# Patient Record
Sex: MALE | Race: WHITE | NOT HISPANIC OR LATINO | Employment: UNEMPLOYED | URBAN - NONMETROPOLITAN AREA
[De-identification: names, ages, dates, MRNs, and addresses within clinical notes are randomized per-mention and may not be internally consistent; named-entity substitution may affect disease eponyms.]

---

## 2017-01-23 ENCOUNTER — TELEPHONE (OUTPATIENT)
Dept: CARDIOLOGY | Facility: CLINIC | Age: 82
End: 2017-01-23

## 2017-01-23 RX ORDER — METOPROLOL SUCCINATE 25 MG/1
TABLET, EXTENDED RELEASE ORAL
Qty: 30 TABLET | Refills: 4 | Status: SHIPPED | OUTPATIENT
Start: 2017-01-23 | End: 2017-06-26 | Stop reason: SDUPTHER

## 2017-01-23 NOTE — TELEPHONE ENCOUNTER
CALLED AND SPOKE WITH PT TO VERIFY WHICH METOPROLOL HE IS TAKING SINCE WE HAVE METOPROLOL TART 25MG LISTED & METOPROLOL SUCC 25MG LISTED IN CHART. (WE HAD RECEIVED A REFILL REQUEST ON EPIC FOR METOPROLOL SUCC ER)     PT STATED HE IS TAKING METOPROLOL SUCC ER 25MG QD.    I WILL D/C THE TARTRATE IN HIS MED LIST PER PT.

## 2017-06-26 RX ORDER — METOPROLOL SUCCINATE 25 MG/1
TABLET, EXTENDED RELEASE ORAL
Qty: 30 TABLET | Refills: 3 | Status: SHIPPED | OUTPATIENT
Start: 2017-06-26 | End: 2017-11-08 | Stop reason: SDUPTHER

## 2017-07-19 ENCOUNTER — LAB (OUTPATIENT)
Dept: UROLOGY | Facility: CLINIC | Age: 82
End: 2017-07-19

## 2017-07-19 DIAGNOSIS — C61 PROSTATE CANCER (HCC): Primary | ICD-10-CM

## 2017-07-19 LAB — PSA SERPL-MCNC: 0.02 NG/ML (ref 0–4)

## 2017-07-19 PROCEDURE — 84153 ASSAY OF PSA TOTAL: CPT | Performed by: UROLOGY

## 2017-07-28 ENCOUNTER — OFFICE VISIT (OUTPATIENT)
Dept: UROLOGY | Facility: CLINIC | Age: 82
End: 2017-07-28

## 2017-07-28 VITALS
SYSTOLIC BLOOD PRESSURE: 124 MMHG | HEIGHT: 70 IN | HEART RATE: 59 BPM | DIASTOLIC BLOOD PRESSURE: 70 MMHG | WEIGHT: 185 LBS | BODY MASS INDEX: 26.48 KG/M2

## 2017-07-28 DIAGNOSIS — R31.9 HEMATURIA: Primary | ICD-10-CM

## 2017-07-28 DIAGNOSIS — C61 CA OF PROSTATE (HCC): ICD-10-CM

## 2017-07-28 LAB
BILIRUB BLD-MCNC: NEGATIVE MG/DL
CLARITY, POC: CLEAR
COLOR UR: YELLOW
GLUCOSE UR STRIP-MCNC: NEGATIVE MG/DL
KETONES UR QL: NEGATIVE
LEUKOCYTE EST, POC: NEGATIVE
NITRITE UR-MCNC: NEGATIVE MG/ML
PH UR: 6 [PH] (ref 5–8)
PROT UR STRIP-MCNC: NEGATIVE MG/DL
RBC # UR STRIP: NEGATIVE /UL
SP GR UR: 1.03 (ref 1–1.03)
UROBILINOGEN UR QL: NORMAL

## 2017-07-28 PROCEDURE — 99213 OFFICE O/P EST LOW 20 MIN: CPT | Performed by: UROLOGY

## 2017-07-28 PROCEDURE — 81002 URINALYSIS NONAUTO W/O SCOPE: CPT | Performed by: UROLOGY

## 2017-07-28 NOTE — PROGRESS NOTES
Chief Complaint:          Chief Complaint   Patient presents with   • Prostate Cancer     Yearly follow up        HPI:   82 y.o. male.  82-year-old white male status post a radical retropubic prostatectomy by Dr. Bahena 3 years ago he is doing great he has no stress leakage she has occasional dribbling.  He has no erection problems his erection is not as strong as it was but is certainly adequate there is no frequency, urgency, there is no skeletal related events symptomatology there is no constitutional symptomatology he has an empty rectal fossa and his PSA was 0.20 noted on 7/19/17        Past Medical History:        Past Medical History:   Diagnosis Date   • Acute myocardial infarction    • Adenocarcinoma     prostate gland   • Arthritis    • Heart attack    • Hx of radiation therapy    • Hypertension    • Prostate cancer          Current Meds:     Current Outpatient Prescriptions   Medication Sig Dispense Refill   • amiodarone (PACERONE) 200 MG tablet Take  by mouth.     • amitriptyline (ELAVIL) 10 MG tablet Take 10 mg by mouth Every Night. 1/2 tablet at bedtime     • aspirin 81 MG tablet Take 81 mg by mouth Daily.     • HYDROcodone-acetaminophen (NORCO) 7.5-325 MG per tablet Take  by mouth.     • lisinopril (PRINIVIL,ZESTRIL) 10 MG tablet Take 10 mg by mouth Daily.     • lisinopril-hydrochlorothiazide (ZESTORETIC) 20-25 MG per tablet Take  by mouth.     • metoprolol succinate XL (TOPROL-XL) 25 MG 24 hr tablet TAKE ONE TABLET BY MOUTH DAILY 30 tablet 3   • naproxen sodium (ALEVE) 220 MG tablet Take  by mouth.     • rosuvastatin (CRESTOR) 5 MG tablet Take 5 mg by mouth Daily.     • tamsulosin (FLOMAX) 0.4 MG capsule 24 hr capsule Take 1 capsule by mouth Every Night.       No current facility-administered medications for this visit.         Allergies:      No Known Allergies     Past Surgical History:     Past Surgical History:   Procedure Laterality Date   • BIOPSY PROSTATE NEEDLE / PUNCH / INCISIONAL     •  CORONARY ARTERY BYPASS GRAFT     • OTHER SURGICAL HISTORY      Coronary Artery Triple Arterial Byupass Graft   • PROSTATE SURGERY     • PROSTATECTOMY      Robotic-assisted   • VASECTOMY           Social History:     Social History     Social History   • Marital status:      Spouse name: N/A   • Number of children: N/A   • Years of education: N/A     Occupational History   • Not on file.     Social History Main Topics   • Smoking status: Never Smoker   • Smokeless tobacco: Not on file   • Alcohol use Yes   • Drug use: Not on file   • Sexual activity: Not on file     Other Topics Concern   • Not on file     Social History Narrative       Family History:     Family History   Problem Relation Age of Onset   • Kidney disease Father    • Heart disease Father    • Heart attack Father    • Kidney disease Mother    • Heart disease Mother        Review of Systems:     Review of Systems   Constitutional: Negative.    HENT: Negative.    Eyes: Negative.    Respiratory: Negative.    Cardiovascular: Negative.    Gastrointestinal: Negative.    Endocrine: Negative.    Genitourinary: Negative.    Musculoskeletal: Negative.    Allergic/Immunologic: Negative.    Neurological: Negative.    Hematological: Negative.    Psychiatric/Behavioral: Negative.        Physical Exam:     Physical Exam   Constitutional: He is oriented to person, place, and time. He appears well-developed and well-nourished.   HENT:   Head: Normocephalic and atraumatic.   Eyes: Conjunctivae and EOM are normal. Pupils are equal, round, and reactive to light.   Neck: Normal range of motion.   Cardiovascular: Normal rate, regular rhythm, normal heart sounds and intact distal pulses.    Pulmonary/Chest: Effort normal and breath sounds normal.   Abdominal: Soft. Bowel sounds are normal.   Genitourinary: Penis normal.   Genitourinary Comments: Good rectal tone and an empty rectal fossa   Musculoskeletal: Normal range of motion.   Neurological: He is alert and  oriented to person, place, and time. He has normal reflexes.   Skin: Skin is warm and dry.   Psychiatric: He has a normal mood and affect. His behavior is normal. Judgment and thought content normal.   Nursing note and vitals reviewed.      Procedure:       Assessment:   No diagnosis found.  No orders of the defined types were placed in this encounter.      Plan:   *Prostate cancer status post a radical prostatectomy with a castrate prostate doing great with no symptomatology he is clearly cured from his RRP surgery**           This document has been electronically signed by JAN SANDOVAL MD July 28, 2017 10:08 AM

## 2017-10-30 ENCOUNTER — OFFICE VISIT (OUTPATIENT)
Dept: CARDIOLOGY | Facility: CLINIC | Age: 82
End: 2017-10-30

## 2017-10-30 VITALS
WEIGHT: 203.5 LBS | BODY MASS INDEX: 29.13 KG/M2 | SYSTOLIC BLOOD PRESSURE: 129 MMHG | HEART RATE: 67 BPM | DIASTOLIC BLOOD PRESSURE: 70 MMHG | OXYGEN SATURATION: 95 % | HEIGHT: 70 IN

## 2017-10-30 DIAGNOSIS — I25.10 ARTERIOSCLEROSIS OF CORONARY ARTERY: Primary | ICD-10-CM

## 2017-10-30 DIAGNOSIS — I10 ESSENTIAL HYPERTENSION: ICD-10-CM

## 2017-10-30 DIAGNOSIS — E78.2 MIXED HYPERLIPIDEMIA: ICD-10-CM

## 2017-10-30 DIAGNOSIS — I48.0 PAROXYSMAL A-FIB (HCC): ICD-10-CM

## 2017-10-30 PROCEDURE — 99213 OFFICE O/P EST LOW 20 MIN: CPT | Performed by: NURSE PRACTITIONER

## 2017-10-30 PROCEDURE — 93000 ELECTROCARDIOGRAM COMPLETE: CPT | Performed by: NURSE PRACTITIONER

## 2017-10-30 NOTE — PROGRESS NOTES
Subjective     Chief Complaint: Follow-up    History of Present Illness   Angelito Carter is a delightful 82 y.o. male who presents for follow-up of paroxysmal atrial fibrillation, arteriosclerosis of coronary artery, and hypertension.  He initially presented in March 2015 with acute coronary syndrome and was noted to have an occluded LAD.  He underwent  balloon angioplasty of the LAD which stabilized him and then he subsequently underwent bypass surgery with an internal mammary artery graft placed to the LAD, a vein graft placed to the obtuse marginal branch and another vein graft placed to the posterior descending artery.      He has done very well since his bypass surgery and at today's visit he denies complaints of chest pain, chest tightness, palpitations, lower extremity swelling, syncope or near syncope.  He does report that he bruises easily however he does deny bright red blood per rectum or black tarry stools.  Mr. Carter exercises regularly, walking on a treadmill.  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  He is compliant with medication administration.  He reports blood pressures at home to be in the 120s 130s over 70 and 80.        Current Outpatient Prescriptions:   •  amiodarone (PACERONE) 200 MG tablet, Take  by mouth., Disp: , Rfl:   •  amitriptyline (ELAVIL) 10 MG tablet, Take 10 mg by mouth Every Night. 1/2 tablet at bedtime, Disp: , Rfl:   •  aspirin 81 MG tablet, Take 81 mg by mouth Daily., Disp: , Rfl:   •  HYDROcodone-acetaminophen (NORCO) 7.5-325 MG per tablet, Take  by mouth., Disp: , Rfl:   •  lisinopril (PRINIVIL,ZESTRIL) 10 MG tablet, Take 10 mg by mouth Daily., Disp: , Rfl:   •  lisinopril-hydrochlorothiazide (ZESTORETIC) 20-25 MG per tablet, Take  by mouth., Disp: , Rfl:   •  metoprolol succinate XL (TOPROL-XL) 25 MG 24 hr tablet, TAKE ONE TABLET BY MOUTH DAILY, Disp: 30 tablet, Rfl: 3  •  naproxen sodium (ALEVE) 220 MG tablet, Take  by mouth., Disp: , Rfl:   •  rosuvastatin (CRESTOR) 5  "MG tablet, Take 5 mg by mouth Daily., Disp: , Rfl:   •  tamsulosin (FLOMAX) 0.4 MG capsule 24 hr capsule, Take 1 capsule by mouth Every Night., Disp: , Rfl:      The following portions of the patient's history were reviewed and updated as appropriate: allergies, current medications, past family history, past medical history, past social history, past surgical history and problem list.    Review of Systems   Constitutional: Negative for activity change, appetite change and fatigue.   HENT: Negative for congestion and tinnitus.    Eyes: Negative for visual disturbance.   Respiratory: Negative for cough, chest tightness and shortness of breath.    Cardiovascular: Negative for chest pain, palpitations and leg swelling.   Gastrointestinal: Negative for blood in stool, nausea and vomiting.   Endocrine: Negative for cold intolerance, heat intolerance and polyuria.   Genitourinary: Negative for dysuria.   Musculoskeletal: Negative for myalgias and neck pain.   Skin: Negative for rash.   Neurological: Negative for dizziness, syncope, weakness and light-headedness.   Hematological: Bruises/bleeds easily.   Psychiatric/Behavioral: Negative for confusion and sleep disturbance.       Objective     /70 (BP Location: Left arm, Patient Position: Sitting)  Pulse 67  Ht 70\" (177.8 cm)  Wt 203 lb 8 oz (92.3 kg)  SpO2 95%  BMI 29.2 kg/m2    Physical Exam   Constitutional: He appears well-developed and well-nourished.   HENT:   Head: Normocephalic and atraumatic.   Eyes: Pupils are equal, round, and reactive to light.   Neck: No JVD present.   Cardiovascular: Normal rate, regular rhythm, S1 normal, S2 normal, normal heart sounds and intact distal pulses.  Exam reveals no gallop and no friction rub.    No murmur heard.  Pulses:       Radial pulses are 2+ on the right side, and 2+ on the left side.        Dorsalis pedis pulses are 2+ on the right side, and 2+ on the left side.        Posterior tibial pulses are 2+ on the right " side, and 2+ on the left side.   No lower extremity edema   Pulmonary/Chest: Effort normal and breath sounds normal. No respiratory distress. He has no wheezes. He has no rales.   Abdominal: Soft. He exhibits no mass. There is no tenderness. No hernia.   Skin: Skin is warm and dry.   Psychiatric: He has a normal mood and affect.         ECG 12 Lead  Date/Time: 10/30/2017 9:55 AM  Performed by: BIJAN AYALA  Authorized by: BIJAN AYALA   Comparison: compared with previous ECG from 10/28/2016  Similar to previous ECG  Rhythm: sinus rhythm  Rate: normal  BPM: 63  Q waves: V1 and V2  Clinical impression: abnormal ECG  Comments: Septal myocardial infarction of indeterminate age              Assessment/Plan       Angelito was seen today for follow-up.    Diagnoses and all orders for this visit:    Arteriosclerosis of coronary artery     -Stable.   -Continue current medications:  Metoprolol succinate, and lisinopril   -Continue risk modifications    Essential hypertension     -Stable   -Continue current medications:  Metoprolol succinate, lisinopril, and hydrochlorothiazide   -Continue risk modifications    Mixed hyperlipidemia     -Stable by patient report   -Copy of most recent labs request from PCP office   -Continue current medications:  Crestor   -Continue lifestyle modifications    HX OF Paroxysmal a-fib      -Stable.  Denies palpitations   -Continue amiodarone.       Risk Factor Modification     I have discussed a heart healthy diet with the patient.  I have encouraged a diet high in fruits and vegetables as well as lean protein and whole grains.  I have indicated to the patient that even a modest reduction of 3-5% in his/her weight can provide health benefits by decreasing blood sugar and triglycerides; a greater than 5% weight reduction can improve blood pressure, lipids and reduce the need for some medications.       I have encouraged the patient to continue current level of activity.  I have advised  them that the recommendation is to participate in physical activity at least 30 minutes a day, 5-7 days a week.           Return to clinic in 1 year and when necessary      REILLY Zazueta

## 2017-11-10 RX ORDER — METOPROLOL SUCCINATE 25 MG/1
TABLET, EXTENDED RELEASE ORAL
Qty: 30 TABLET | Refills: 11 | Status: SHIPPED | OUTPATIENT
Start: 2017-11-10 | End: 2018-09-06 | Stop reason: SDUPTHER

## 2018-09-06 RX ORDER — METOPROLOL SUCCINATE 25 MG/1
25 TABLET, EXTENDED RELEASE ORAL DAILY
Qty: 30 TABLET | Refills: 1 | Status: SHIPPED | OUTPATIENT
Start: 2018-09-06 | End: 2018-11-04 | Stop reason: SDUPTHER

## 2018-09-20 ENCOUNTER — OFFICE VISIT (OUTPATIENT)
Dept: UROLOGY | Facility: CLINIC | Age: 83
End: 2018-09-20

## 2018-09-20 DIAGNOSIS — C61 CA OF PROSTATE (HCC): ICD-10-CM

## 2018-09-20 DIAGNOSIS — N40.0 BPH WITHOUT URINARY OBSTRUCTION: Primary | ICD-10-CM

## 2018-09-20 LAB — PSA SERPL-MCNC: 0.01 NG/ML (ref 0–4)

## 2018-09-20 PROCEDURE — 99213 OFFICE O/P EST LOW 20 MIN: CPT | Performed by: UROLOGY

## 2018-09-20 PROCEDURE — 84153 ASSAY OF PSA TOTAL: CPT | Performed by: UROLOGY

## 2018-09-20 NOTE — PROGRESS NOTES
Chief Complaint:          No chief complaint on file.      HPI:   83 y.o. male.  83-year-old white male status post a radical retropubic prostatectomy by Dr. Bahena 3 years ago he is doing great he has no stress leakage she has occasional dribbling.  He has no erection problems his erection is not as strong as it was but is certainly adequate there is no frequency, urgency, there is no skeletal related events symptomatology there is no constitutional symptomatology he has an empty rectal fossa and his PSA was 0.020 noted on 7/19/17.  He returns today he's doing fantastic he's having no burning, blood, discharge, he has no voiding dysfunction and no other systemic symptomatology particularly skeletal related events       Past Medical History:        Past Medical History:   Diagnosis Date   • Acute myocardial infarction    • Adenocarcinoma (CMS/HCC)     prostate gland   • Arthritis    • Heart attack    • Hx of radiation therapy    • Hypertension    • Prostate cancer (CMS/HCC)          Current Meds:     Current Outpatient Prescriptions   Medication Sig Dispense Refill   • amiodarone (PACERONE) 200 MG tablet Take  by mouth.     • amitriptyline (ELAVIL) 10 MG tablet Take 10 mg by mouth Every Night. 1/2 tablet at bedtime     • aspirin 81 MG tablet Take 81 mg by mouth Daily.     • HYDROcodone-acetaminophen (NORCO) 7.5-325 MG per tablet Take  by mouth.     • lisinopril (PRINIVIL,ZESTRIL) 10 MG tablet Take 10 mg by mouth Daily.     • lisinopril-hydrochlorothiazide (ZESTORETIC) 20-25 MG per tablet Take  by mouth.     • metoprolol succinate XL (TOPROL-XL) 25 MG 24 hr tablet Take 1 tablet by mouth Daily. 30 tablet 1   • naproxen sodium (ALEVE) 220 MG tablet Take  by mouth.     • rosuvastatin (CRESTOR) 5 MG tablet Take 5 mg by mouth Daily.     • tamsulosin (FLOMAX) 0.4 MG capsule 24 hr capsule Take 1 capsule by mouth Every Night.       No current facility-administered medications for this visit.         Allergies:      No Known  Allergies     Past Surgical History:     Past Surgical History:   Procedure Laterality Date   • BIOPSY PROSTATE NEEDLE / PUNCH / INCISIONAL     • CORONARY ARTERY BYPASS GRAFT     • OTHER SURGICAL HISTORY      Coronary Artery Triple Arterial Byupass Graft   • PROSTATE SURGERY     • PROSTATECTOMY      Robotic-assisted   • VASECTOMY           Social History:     Social History     Social History   • Marital status:      Spouse name: N/A   • Number of children: N/A   • Years of education: N/A     Occupational History   • Not on file.     Social History Main Topics   • Smoking status: Never Smoker   • Smokeless tobacco: Not on file   • Alcohol use Yes   • Drug use: Unknown   • Sexual activity: Not on file     Other Topics Concern   • Not on file     Social History Narrative   • No narrative on file       Family History:     Family History   Problem Relation Age of Onset   • Kidney disease Father    • Heart disease Father    • Heart attack Father    • Kidney disease Mother    • Heart disease Mother        Review of Systems:     Review of Systems   Constitutional: Negative.    HENT: Negative.    Eyes: Negative.    Respiratory: Negative.    Cardiovascular: Negative.    Gastrointestinal: Negative.    Endocrine: Negative.    Musculoskeletal: Negative.    Allergic/Immunologic: Negative.    Neurological: Negative.    Hematological: Negative.    Psychiatric/Behavioral: Negative.        Physical Exam:     Physical Exam   Constitutional: He is oriented to person, place, and time. He appears well-developed and well-nourished.   HENT:   Head: Normocephalic and atraumatic.   Eyes: Pupils are equal, round, and reactive to light. Conjunctivae and EOM are normal.   Neck: Normal range of motion.   Cardiovascular: Normal rate, regular rhythm, normal heart sounds and intact distal pulses.    Pulmonary/Chest: Effort normal and breath sounds normal.   Abdominal: Soft. Bowel sounds are normal.   Genitourinary:   Genitourinary Comments:  Empty rectal fossa   Musculoskeletal: Normal range of motion.   Neurological: He is alert and oriented to person, place, and time. He has normal reflexes.   Skin: Skin is warm and dry.   Psychiatric: He has a normal mood and affect. His behavior is normal. Judgment and thought content normal.   Nursing note and vitals reviewed.      I have reviewed the following portions of the patient's history: allergies, current medications, past family history, past medical history, past social history, past surgical history, problem list and ROS and confirm it's accurate.      Procedure:       Assessment/Plan:   Prostate cancer:  He returns today status post biopsy for extensive discussion of prostate cancer.  We discussed staging, and grading of the disease.  I described with a [place] system being from a 2-10 scale with the most common low-grade pattern being a [place] 6.  I discussed the staging workup including a total body bone scan and CT scan especially with a PSA is greater than 10.  We discussed the various options at length I discussed a radical retropubic prostatectomy done in the traditional fashion and using the robotic technique.  I then discussed radiation treatment both seed therapy and external beam therapy using Gold fiduciary markers.  We talked about some of the other alternatives such as a cryosurgical ablation at high intensity focused ultrasound as being viable alternatives but not recommended at this time.  He focused on aggressive watchful waiting and explained that currently low literature it's been discovered that a lot of men can actually observe it especially with numerous other comorbidities cannot have problems from the cancer by itself.  Talked about hormonal ablation.  In the side effects of creation of insulin resistance.  Overall, the patient was given appropriate literature is going to think about it and I'm going to revisit the topic with them after her next office visit he's clearly cured  and doing great     Patient's There is no height or weight on file to calculate BMI. BMI is above normal parameters. Recommendations include: educational material.          This document has been electronically signed by JAN SANDOVAL MD September 20, 2018 10:29 AM

## 2018-09-21 ENCOUNTER — TELEPHONE (OUTPATIENT)
Dept: UROLOGY | Facility: CLINIC | Age: 83
End: 2018-09-21

## 2018-09-21 NOTE — TELEPHONE ENCOUNTER
I called the patient with his results.    ----- Message from Faizan Lopez MD sent at 9/20/2018  2:16 PM EDT -----  Notify fantastic

## 2018-10-31 ENCOUNTER — OFFICE VISIT (OUTPATIENT)
Dept: CARDIOLOGY | Facility: CLINIC | Age: 83
End: 2018-10-31

## 2018-10-31 VITALS
SYSTOLIC BLOOD PRESSURE: 137 MMHG | OXYGEN SATURATION: 96 % | HEIGHT: 70 IN | WEIGHT: 206.5 LBS | BODY MASS INDEX: 29.56 KG/M2 | DIASTOLIC BLOOD PRESSURE: 79 MMHG | HEART RATE: 67 BPM

## 2018-10-31 DIAGNOSIS — I48.0 PAROXYSMAL A-FIB (HCC): ICD-10-CM

## 2018-10-31 DIAGNOSIS — I10 ESSENTIAL HYPERTENSION: ICD-10-CM

## 2018-10-31 DIAGNOSIS — E78.2 MIXED HYPERLIPIDEMIA: ICD-10-CM

## 2018-10-31 DIAGNOSIS — I25.10 ARTERIOSCLEROSIS OF CORONARY ARTERY: Primary | ICD-10-CM

## 2018-10-31 PROCEDURE — 99213 OFFICE O/P EST LOW 20 MIN: CPT | Performed by: NURSE PRACTITIONER

## 2018-10-31 PROCEDURE — 93000 ELECTROCARDIOGRAM COMPLETE: CPT | Performed by: NURSE PRACTITIONER

## 2018-10-31 RX ORDER — CHLORAL HYDRATE 500 MG
1000 CAPSULE ORAL
Status: ON HOLD | COMMUNITY
End: 2021-10-10

## 2018-10-31 RX ORDER — LANOLIN ALCOHOL/MO/W.PET/CERES
1000 CREAM (GRAM) TOPICAL DAILY
COMMUNITY

## 2018-10-31 RX ORDER — TRAMADOL HYDROCHLORIDE 50 MG/1
50 TABLET ORAL 2 TIMES DAILY PRN
Status: ON HOLD | COMMUNITY
Start: 2018-09-14 | End: 2023-02-14

## 2018-10-31 NOTE — PROGRESS NOTES
Subjective     Chief Complaint: ASCVD; Hypertension; Hyperlipidemia; and Atrial Fibrillation (history of)    History of Present Illness   Angelito Carter is a 83 y.o. male who presents with a past medical history significant for ASCVD status post ACS with PCI to the LAD in March 2015 and coronary artery bypass surgery with internal mammary artery graft to the LAD, vein graft to the obtuse marginal branch and vein graft to the posterior distending artery, also in 2015, hypertension, hyperlipidemia, and questionable/unknown history of paroxysmal atrial fibrillation, currently in sinus rhythm and not anticoagulated.  He presents today for his regular follow-up visit.    At today's visit Mr. Carter reports that he is not having any problems with chest pain, shortness of breath, [place], leg swelling, palpitations.  He denies syncope or near syncopal event.  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      Current Outpatient Prescriptions:   •  aspirin 81 MG tablet, Take 81 mg by mouth Daily., Disp: , Rfl:   •  Glucosamine-Chondroitin (OSTEO BI-FLEX REGULAR STRENGTH PO), Take  by mouth., Disp: , Rfl:   •  HYDROcodone-acetaminophen (NORCO) 7.5-325 MG per tablet, Take  by mouth., Disp: , Rfl:   •  lisinopril (PRINIVIL,ZESTRIL) 10 MG tablet, Take 10 mg by mouth Daily., Disp: , Rfl:   •  metoprolol succinate XL (TOPROL-XL) 25 MG 24 hr tablet, Take 1 tablet by mouth Daily., Disp: 30 tablet, Rfl: 1  •  Omega-3 Fatty Acids (FISH OIL) 1000 MG capsule capsule, Take 1,000 mg by mouth Daily With Breakfast., Disp: , Rfl:   •  rosuvastatin (CRESTOR) 5 MG tablet, Take 5 mg by mouth Daily., Disp: , Rfl:   •  tamsulosin (FLOMAX) 0.4 MG capsule 24 hr capsule, Take 1 capsule by mouth Every Night., Disp: , Rfl:   •  traMADol (ULTRAM) 50 MG tablet, , Disp: , Rfl:   •  vitamin B-12 (CYANOCOBALAMIN) 1000 MCG tablet, Take 1,000 mcg by mouth Daily., Disp: , Rfl:      The following portions of  "the patient's history were reviewed and updated as appropriate: allergies, current medications, past family history, past medical history, past social history, past surgical history and problem list.    Review of Systems   Constitution: Negative for weakness.   Cardiovascular: Negative for chest pain, irregular heartbeat, near-syncope, palpitations and syncope.   Respiratory: Positive for cough. Negative for shortness of breath.    Hematologic/Lymphatic: Bruises/bleeds easily (no bright red blood per rectum or black tarry stools).         Objective     /79 (BP Location: Left arm)   Pulse 67   Ht 177.8 cm (70\")   Wt 93.7 kg (206 lb 8 oz)   SpO2 96%   BMI 29.63 kg/m²     Physical Exam   Constitutional: He appears well-developed and well-nourished.   HENT:   Head: Normocephalic and atraumatic.   Eyes: Pupils are equal, round, and reactive to light.   Neck: No JVD present.   Cardiovascular: Normal rate, regular rhythm and intact distal pulses.  Exam reveals no gallop and no friction rub.    No murmur heard.  Pulmonary/Chest: Effort normal and breath sounds normal. No respiratory distress. He has no wheezes. He has no rales.   Abdominal: Soft. He exhibits no mass. There is no tenderness. No hernia.   Skin: Skin is warm and dry.   Psychiatric: He has a normal mood and affect.   Vitals reviewed.        ECG 12 Lead  Date/Time: 10/31/2018 12:42 PM  Performed by: BIJAN AYALA  Authorized by: BIJAN AYALA   Comparison: compared with previous ECG from 10/30/2017  Similar to previous ECG  Comparison to previous ECG: Sinus rhythm, septal myocardial infarction of indeterminate age  Rhythm: sinus rhythm  Rate: normal  BPM: 62  QRS axis: normal  Q waves: V1 and V2  Clinical impression: abnormal ECG  Comments: Q waves in V1 and V2 are present          Assessment/Plan       Angelito was seen today for ascvd, hypertension, hyperlipidemia and atrial fibrillation.    Diagnoses and all orders for this " visit:    Assessment:  1. ASCVD, status post ACS PCI KULWINDER LAD in March 2015 and CABG in March 2015.  Stable.  2. Essential hypertension, stable  3. Mixed hyperlipidemia, followed per PCP.  4. History of paroxysmal atrial fibrillation      Plan:  1. Mr. Chavez ASCVD is stable at this time.  He will continue his current medications including aspirin, lisinopril, metoprolol succinate XL, and Crestor.  2. Requested most recent labs from Dr. Bajwa's office  3. With regard to Mr. Carter history of paroxysmal atrial fibrillation it appears from review of his chart he has not been anticoagulated and an antiarrhythmic was apparently discontinued in 2015.  His EKG shows normal sinus rhythm, he denies palpitations, therefore       Return in about 1 year (around 10/31/2019).      REILLY Zazueta

## 2018-11-05 RX ORDER — METOPROLOL SUCCINATE 25 MG/1
TABLET, EXTENDED RELEASE ORAL
Qty: 30 TABLET | Refills: 0 | Status: SHIPPED | OUTPATIENT
Start: 2018-11-05 | End: 2018-12-04 | Stop reason: SDUPTHER

## 2018-12-04 RX ORDER — METOPROLOL SUCCINATE 25 MG/1
TABLET, EXTENDED RELEASE ORAL
Qty: 30 TABLET | Refills: 10 | Status: SHIPPED | OUTPATIENT
Start: 2018-12-04 | End: 2019-12-11 | Stop reason: SDUPTHER

## 2019-09-20 ENCOUNTER — OFFICE VISIT (OUTPATIENT)
Dept: UROLOGY | Facility: CLINIC | Age: 84
End: 2019-09-20

## 2019-09-20 VITALS
HEIGHT: 70 IN | DIASTOLIC BLOOD PRESSURE: 62 MMHG | SYSTOLIC BLOOD PRESSURE: 122 MMHG | WEIGHT: 206 LBS | TEMPERATURE: 97.2 F | BODY MASS INDEX: 29.49 KG/M2

## 2019-09-20 DIAGNOSIS — N42.9 DISORDER OF PROSTATE: Primary | ICD-10-CM

## 2019-09-20 DIAGNOSIS — C61 CA OF PROSTATE (HCC): ICD-10-CM

## 2019-09-20 LAB — PSA SERPL-MCNC: <0.014 NG/ML (ref 0–4)

## 2019-09-20 PROCEDURE — 84153 ASSAY OF PSA TOTAL: CPT | Performed by: UROLOGY

## 2019-09-20 PROCEDURE — 99213 OFFICE O/P EST LOW 20 MIN: CPT | Performed by: UROLOGY

## 2019-09-20 NOTE — PROGRESS NOTES
Chief Complaint:          Chief Complaint   Patient presents with   • Benign Prostatic Hypertrophy     1 yr f/u       HPI:   84 y.o. male here for follow-up.  He has a history of prostate cancer with a castrate PSA is checked on 9/20/2018 he has no erections he has a slow stream he gets up twice at night he still capital forms.  Is now 10 years since his radical prostate he had a recent bypass he has an empty rectal fossa I will see him back in a year pending the results of his repeat PSA      Past Medical History:        Past Medical History:   Diagnosis Date   • Acute myocardial infarction (CMS/HCC)    • Adenocarcinoma (CMS/HCC)     prostate gland   • Arthritis    • Heart attack (CMS/HCC)    • Hx of radiation therapy    • Hypertension    • Prostate cancer (CMS/HCC)          Current Meds:     Current Outpatient Medications   Medication Sig Dispense Refill   • aspirin 81 MG tablet Take 81 mg by mouth Daily.     • Glucosamine-Chondroitin (OSTEO BI-FLEX REGULAR STRENGTH PO) Take  by mouth.     • HYDROcodone-acetaminophen (NORCO) 7.5-325 MG per tablet Take  by mouth.     • lisinopril (PRINIVIL,ZESTRIL) 10 MG tablet Take 10 mg by mouth Daily.     • metoprolol succinate XL (TOPROL-XL) 25 MG 24 hr tablet TAKE ONE TABLET BY MOUTH DAILY 30 tablet 10   • Omega-3 Fatty Acids (FISH OIL) 1000 MG capsule capsule Take 1,000 mg by mouth Daily With Breakfast.     • rosuvastatin (CRESTOR) 5 MG tablet Take 5 mg by mouth Daily.     • tamsulosin (FLOMAX) 0.4 MG capsule 24 hr capsule Take 1 capsule by mouth Every Night.     • traMADol (ULTRAM) 50 MG tablet      • vitamin B-12 (CYANOCOBALAMIN) 1000 MCG tablet Take 1,000 mcg by mouth Daily.       No current facility-administered medications for this visit.         Allergies:      Allergies   Allergen Reactions   • Adhesive Tape Rash        Past Surgical History:     Past Surgical History:   Procedure Laterality Date   • BIOPSY PROSTATE NEEDLE / PUNCH / INCISIONAL     • CORONARY ARTERY  BYPASS GRAFT     • OTHER SURGICAL HISTORY      Coronary Artery Triple Arterial Byupass Graft   • PROSTATE SURGERY     • PROSTATECTOMY      Robotic-assisted   • VASECTOMY           Social History:     Social History     Socioeconomic History   • Marital status:      Spouse name: Not on file   • Number of children: Not on file   • Years of education: Not on file   • Highest education level: Not on file   Tobacco Use   • Smoking status: Never Smoker   • Smokeless tobacco: Never Used   Substance and Sexual Activity   • Alcohol use: Yes       Family History:     Family History   Problem Relation Age of Onset   • Kidney disease Father    • Heart disease Father    • Heart attack Father    • Kidney disease Mother    • Heart disease Mother        Review of Systems:     Review of Systems   Constitutional: Negative.    HENT: Negative.    Eyes: Negative.    Respiratory: Negative.    Cardiovascular: Negative.    Gastrointestinal: Negative.    Endocrine: Negative.    Musculoskeletal: Negative.    Allergic/Immunologic: Negative.    Neurological: Negative.    Hematological: Negative.    Psychiatric/Behavioral: Negative.        Physical Exam:     Physical Exam   Constitutional: He is oriented to person, place, and time. He appears well-developed and well-nourished.   HENT:   Head: Normocephalic and atraumatic.   Eyes: Conjunctivae and EOM are normal. Pupils are equal, round, and reactive to light.   Neck: Normal range of motion.   Cardiovascular: Normal rate, regular rhythm, normal heart sounds and intact distal pulses.   Pulmonary/Chest: Effort normal and breath sounds normal.   Abdominal: Soft. Bowel sounds are normal.   Genitourinary:   Genitourinary Comments: Empty rectal fossa   Musculoskeletal: Normal range of motion.   Neurological: He is alert and oriented to person, place, and time. He has normal reflexes.   Skin: Skin is warm and dry.   Psychiatric: He has a normal mood and affect. His behavior is normal. Judgment  and thought content normal.   Nursing note and vitals reviewed.      I have reviewed the following portions of the patient's history: allergies, current medications, past family history, past medical history, past social history, past surgical history, problem list and ROS and confirm it's accurate.      Procedure:       Assessment/Plan:   Prostate cancer-now 10 years since his radical and doing well he has a 10% risk of recurrence currently            Patient's Body mass index is 29.56 kg/m². BMI is above normal parameters. Recommendations include: educational material.              This document has been electronically signed by JAN SANDOVAL MD September 20, 2019 9:59 AM

## 2019-09-23 ENCOUNTER — TELEPHONE (OUTPATIENT)
Dept: UROLOGY | Facility: CLINIC | Age: 84
End: 2019-09-23

## 2019-12-11 ENCOUNTER — OFFICE VISIT (OUTPATIENT)
Dept: CARDIOLOGY | Facility: CLINIC | Age: 84
End: 2019-12-11

## 2019-12-11 VITALS
DIASTOLIC BLOOD PRESSURE: 65 MMHG | WEIGHT: 208 LBS | BODY MASS INDEX: 29.78 KG/M2 | HEART RATE: 65 BPM | OXYGEN SATURATION: 95 % | HEIGHT: 70 IN | SYSTOLIC BLOOD PRESSURE: 153 MMHG

## 2019-12-11 DIAGNOSIS — I25.10 ARTERIOSCLEROSIS OF CORONARY ARTERY: Primary | ICD-10-CM

## 2019-12-11 DIAGNOSIS — I48.0 PAROXYSMAL A-FIB (HCC): ICD-10-CM

## 2019-12-11 DIAGNOSIS — E78.2 MIXED HYPERLIPIDEMIA: ICD-10-CM

## 2019-12-11 DIAGNOSIS — I10 ESSENTIAL HYPERTENSION: ICD-10-CM

## 2019-12-11 PROCEDURE — 93000 ELECTROCARDIOGRAM COMPLETE: CPT | Performed by: NURSE PRACTITIONER

## 2019-12-11 PROCEDURE — 99213 OFFICE O/P EST LOW 20 MIN: CPT | Performed by: NURSE PRACTITIONER

## 2019-12-11 NOTE — PROGRESS NOTES
Subjective     Chief Complaint: ASCVD; Hypertension; and Hyperlipidemia    History of Present Illness   Angelito Carter is a 84 y.o. male who presents with a past medical history significant for ASCVD status post ACS with PCI to the LAD in March 2015 and coronary artery bypass surgery with internal mammary artery graft to the LAD, vein graft to the obtuse marginal branch and vein graft to the posterior distending artery, also in 2015, hypertension, hyperlipidemia, and questionable/unknown history of paroxysmal atrial fibrillation, currently in sinus rhythm and not anticoagulated.  He presents today for his regular follow-up visit.    Mr Carter denies complaint.  He reports no chest pain or palpitations.  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        Current Outpatient Medications:   •  aspirin 81 MG tablet, Take 1 tablet by mouth Daily., Disp: 30 tablet, Rfl: 11  •  Glucosamine-Chondroitin (OSTEO BI-FLEX REGULAR STRENGTH PO), Take  by mouth., Disp: , Rfl:   •  HYDROcodone-acetaminophen (NORCO) 7.5-325 MG per tablet, Take  by mouth., Disp: , Rfl:   •  lisinopril (PRINIVIL,ZESTRIL) 10 MG tablet, Take 1 tablet by mouth Daily., Disp: 30 tablet, Rfl: 11  •  metoprolol succinate XL (TOPROL-XL) 25 MG 24 hr tablet, Take 1 tablet by mouth Daily., Disp: 30 tablet, Rfl: 11  •  Omega-3 Fatty Acids (FISH OIL) 1000 MG capsule capsule, Take 1,000 mg by mouth Daily With Breakfast., Disp: , Rfl:   •  rosuvastatin (CRESTOR) 5 MG tablet, Take 1 tablet by mouth Daily., Disp: 30 tablet, Rfl: 11  •  tamsulosin (FLOMAX) 0.4 MG capsule 24 hr capsule, Take 1 capsule by mouth Every Night., Disp: , Rfl:   •  traMADol (ULTRAM) 50 MG tablet, , Disp: , Rfl:   •  vitamin B-12 (CYANOCOBALAMIN) 1000 MCG tablet, Take 1,000 mcg by mouth Daily., Disp: , Rfl:      On this date:  The following portions of the patient's history were reviewed and updated as appropriate: allergies, current medications, past family  "history, past medical history, past social history, past surgical history and problem list.    Review of Systems   Constitution: Negative for decreased appetite, malaise/fatigue, weight gain and weight loss.   Cardiovascular: Negative for chest pain, claudication, dyspnea on exertion, irregular heartbeat, leg swelling, near-syncope, palpitations, paroxysmal nocturnal dyspnea and syncope.   Respiratory: Negative for cough and shortness of breath.    Neurological: Negative for dizziness and light-headedness.         Objective     /65 (BP Location: Left arm, Patient Position: Sitting, Cuff Size: Adult)   Pulse 65   Ht 177.8 cm (70\")   Wt 94.3 kg (208 lb)   SpO2 95%   BMI 29.84 kg/m²     Physical Exam   Constitutional: He appears well-developed and well-nourished.   HENT:   Head: Normocephalic and atraumatic.   Eyes: Pupils are equal, round, and reactive to light.   Neck: No JVD present.   Cardiovascular: Normal rate, regular rhythm and intact distal pulses. Exam reveals no gallop and no friction rub.   No murmur heard.  Pulmonary/Chest: Effort normal and breath sounds normal. No respiratory distress. He has no wheezes. He has no rales.   Skin: Skin is warm and dry.   Psychiatric: He has a normal mood and affect.   Vitals reviewed.        ECG 12 Lead  Date/Time: 12/14/2019 2:30 PM  Performed by: Nany Stewart APRN  Authorized by: Nany Stewart APRN   Comparison: compared with previous ECG from 10/31/2018  Similar to previous ECG  Comparison to previous ECG: Sinus rhythm, 62 bpm.  Septal MI of indeterminate age.  Rhythm: sinus tachycardia  BPM: 57  Q waves: V1 and V2    Comments:               Assessment/Plan       Angelito was seen today for ascvd, hypertension and hyperlipidemia.    Diagnoses and all orders for this visit:    Arteriosclerosis of coronary artery   Stable   Continue ASA, lisinopril, metoprolol, and rosuvastatin  Essential hypertension   Stable   Home blood pressures are 120s and " 130s systolically according to the patient     Mixed hyperlipidemia   Followed by PCP    HX OF Paroxysmal a-fib   Counseled patient regarding this questionable history of atrial fibrillation.  We discussed stroke risk and prophylaxis.  He declines anticoagulation    Other orders  -     lisinopril (PRINIVIL,ZESTRIL) 10 MG tablet; Take 1 tablet by mouth Daily.  -     aspirin 81 MG tablet; Take 1 tablet by mouth Daily.  -     metoprolol succinate XL (TOPROL-XL) 25 MG 24 hr tablet; Take 1 tablet by mouth Daily.  -     rosuvastatin (CRESTOR) 5 MG tablet; Take 1 tablet by mouth Daily.  -     ECG 12 Lead    Plan of care was reviewed with the patient.  No changes to his medication regimen were made.  He was counseled on medication compliance and urged to continue daily exercise.    Return in about 1 year (around 12/11/2020).      REILLY Zazueta

## 2019-12-14 RX ORDER — LISINOPRIL 10 MG/1
10 TABLET ORAL DAILY
Qty: 30 TABLET | Refills: 11 | Status: SHIPPED | OUTPATIENT
Start: 2019-12-14 | End: 2021-10-11 | Stop reason: HOSPADM

## 2019-12-14 RX ORDER — ROSUVASTATIN CALCIUM 5 MG/1
5 TABLET, COATED ORAL DAILY
Qty: 30 TABLET | Refills: 11 | Status: ON HOLD | OUTPATIENT
Start: 2019-12-14 | End: 2021-10-10

## 2019-12-14 RX ORDER — METOPROLOL SUCCINATE 25 MG/1
25 TABLET, EXTENDED RELEASE ORAL DAILY
Qty: 30 TABLET | Refills: 11 | Status: SHIPPED | OUTPATIENT
Start: 2019-12-14 | End: 2020-12-07

## 2020-09-21 ENCOUNTER — OFFICE VISIT (OUTPATIENT)
Dept: UROLOGY | Facility: CLINIC | Age: 85
End: 2020-09-21

## 2020-09-21 VITALS — HEIGHT: 70 IN | BODY MASS INDEX: 30.06 KG/M2 | TEMPERATURE: 98.4 F | WEIGHT: 210 LBS

## 2020-09-21 DIAGNOSIS — C61 CA OF PROSTATE (HCC): Primary | ICD-10-CM

## 2020-09-21 PROCEDURE — 99213 OFFICE O/P EST LOW 20 MIN: CPT | Performed by: UROLOGY

## 2020-09-21 RX ORDER — HYDROCODONE BITARTRATE AND ACETAMINOPHEN 7.5; 325 MG/1; MG/1
1 TABLET ORAL EVERY 6 HOURS PRN
Status: ON HOLD | COMMUNITY
End: 2021-10-10

## 2020-09-21 RX ORDER — AMLODIPINE BESYLATE 5 MG/1
1 TABLET ORAL DAILY
COMMUNITY
Start: 2020-09-04 | End: 2020-12-15 | Stop reason: DRUGHIGH

## 2020-09-21 NOTE — PROGRESS NOTES
Chief Complaint:          Chief Complaint   Patient presents with   • Benign Prostatic Hypertrophy     1 yr f/u       HPI:   85 y.o. male returns today.  Doing great he does have some blood pressure problems.  He is having no burning, blood in the urine, discharge he sees Dr. Bajwa his digital rectal unremarkable he will be seen back in 1 year.  He is greater than 10 years status post prostatectomy for cancer.  He is always had a castrate PSA apparently is checked several times per year by his family physician.  Nonetheless, urologically he should be cured at this juncture given his age.  He is mildly confused      Past Medical History:        Past Medical History:   Diagnosis Date   • Acute myocardial infarction (CMS/HCC)    • Adenocarcinoma (CMS/HCC)     prostate gland   • Arthritis    • Heart attack (CMS/HCC)    • Hx of radiation therapy    • Hypertension    • Prostate cancer (CMS/HCC)          Current Meds:     Current Outpatient Medications   Medication Sig Dispense Refill   • amLODIPine (NORVASC) 5 MG tablet Take 1 tablet by mouth Daily.     • aspirin 81 MG tablet Take 1 tablet by mouth Daily. 30 tablet 11   • Glucosamine-Chondroitin (OSTEO BI-FLEX REGULAR STRENGTH PO) Take  by mouth.     • lisinopril (PRINIVIL,ZESTRIL) 10 MG tablet Take 1 tablet by mouth Daily. 30 tablet 11   • metoprolol succinate XL (TOPROL-XL) 25 MG 24 hr tablet Take 1 tablet by mouth Daily. 30 tablet 11   • Omega-3 Fatty Acids (FISH OIL) 1000 MG capsule capsule Take 1,000 mg by mouth Daily With Breakfast.     • rosuvastatin (CRESTOR) 5 MG tablet Take 1 tablet by mouth Daily. 30 tablet 11   • tamsulosin (FLOMAX) 0.4 MG capsule 24 hr capsule Take 1 capsule by mouth Every Night.     • traMADol (ULTRAM) 50 MG tablet      • vitamin B-12 (CYANOCOBALAMIN) 1000 MCG tablet Take 1,000 mcg by mouth Daily.       No current facility-administered medications for this visit.         Allergies:      Allergies   Allergen Reactions   • Adhesive Tape Rash         Past Surgical History:     Past Surgical History:   Procedure Laterality Date   • BIOPSY PROSTATE NEEDLE / PUNCH / INCISIONAL     • CORONARY ARTERY BYPASS GRAFT     • OTHER SURGICAL HISTORY      Coronary Artery Triple Arterial Byupass Graft   • PROSTATE SURGERY     • PROSTATECTOMY      Robotic-assisted   • VASECTOMY           Social History:     Social History     Socioeconomic History   • Marital status:      Spouse name: Not on file   • Number of children: Not on file   • Years of education: Not on file   • Highest education level: Not on file   Tobacco Use   • Smoking status: Never Smoker   • Smokeless tobacco: Never Used   Substance and Sexual Activity   • Alcohol use: Yes       Family History:     Family History   Problem Relation Age of Onset   • Kidney disease Father    • Heart disease Father    • Heart attack Father    • Kidney disease Mother    • Heart disease Mother        Review of Systems:     Review of Systems   Constitutional: Negative.    HENT: Negative.    Eyes: Negative.    Respiratory: Negative.    Cardiovascular: Negative.    Gastrointestinal: Negative.    Endocrine: Negative.    Musculoskeletal: Negative.    Allergic/Immunologic: Negative.    Neurological: Negative.    Hematological: Negative.    Psychiatric/Behavioral: Negative.        Physical Exam:     Physical Exam  Vitals signs and nursing note reviewed.   Constitutional:       Appearance: He is well-developed.   HENT:      Head: Normocephalic and atraumatic.   Eyes:      Conjunctiva/sclera: Conjunctivae normal.      Pupils: Pupils are equal, round, and reactive to light.   Neck:      Musculoskeletal: Normal range of motion.   Cardiovascular:      Rate and Rhythm: Normal rate and regular rhythm.      Heart sounds: Normal heart sounds.   Pulmonary:      Effort: Pulmonary effort is normal.      Breath sounds: Normal breath sounds.   Abdominal:      General: Bowel sounds are normal.      Palpations: Abdomen is soft.    Genitourinary:     Penis: Normal.       Scrotum/Testes: Normal.      Prostate: Normal.      Rectum: Normal.   Musculoskeletal: Normal range of motion.   Skin:     General: Skin is warm and dry.   Neurological:      Mental Status: He is alert and oriented to person, place, and time.      Deep Tendon Reflexes: Reflexes are normal and symmetric.   Psychiatric:         Behavior: Behavior normal.         Thought Content: Thought content normal.         Judgment: Judgment normal.         I have reviewed the following portions of the patient's history: allergies, current medications, past family history, past medical history, past social history, past surgical history, problem list and ROS and confirm it's accurate.      Procedure:       Assessment/Plan:   Prostate cancer:  He returns today status post biopsy for extensive discussion of prostate cancer.  We discussed staging, and grading of the disease.  I described with a Rancho system being from a 2-10 scale with the most common low-grade pattern being a Leonidas 6.  I discussed the staging workup including a total body bone scan and CT scan especially with a PSA is greater than 10.  We discussed the various options at length I discussed a radical retropubic prostatectomy done in the traditional fashion and using the robotic technique.  I then discussed radiation treatment both seed therapy and external beam therapy using Gold fiduciary markers.  We talked about some of the other alternatives such as a cryosurgical ablation at high intensity focused ultrasound as being viable alternatives but not recommended at this time.  He focused on aggressive watchful waiting and explained that currently low literature it's been discovered that a lot of men can actually observe it especially with numerous other comorbidities cannot have problems from the cancer by itself.  Talked about hormonal ablation.  In the side effects of creation of insulin resistance.  Overall, the patient  was given appropriate literature is going to think about it and I'm going to revisit the topic with them after her next office visit  Currently has been 10 years since his radical prostate with castrate PSA checks several times per year by his family physician these were not provided to me.            Patient's Body mass index is 30.13 kg/m². BMI is above normal parameters. Recommendations include: educational material.              This document has been electronically signed by JAN SANDOVAL MD September 21, 2020 15:40 EDT

## 2020-11-10 DIAGNOSIS — M25.521 RIGHT ELBOW PAIN: Primary | ICD-10-CM

## 2020-11-11 ENCOUNTER — APPOINTMENT (OUTPATIENT)
Dept: GENERAL RADIOLOGY | Facility: HOSPITAL | Age: 85
End: 2020-11-11

## 2020-11-11 ENCOUNTER — OFFICE VISIT (OUTPATIENT)
Dept: ORTHOPEDIC SURGERY | Facility: CLINIC | Age: 85
End: 2020-11-11

## 2020-11-11 VITALS
DIASTOLIC BLOOD PRESSURE: 65 MMHG | HEIGHT: 70 IN | WEIGHT: 200 LBS | BODY MASS INDEX: 28.63 KG/M2 | TEMPERATURE: 97.8 F | HEART RATE: 58 BPM | SYSTOLIC BLOOD PRESSURE: 151 MMHG

## 2020-11-11 DIAGNOSIS — M70.21 OLECRANON BURSITIS OF RIGHT ELBOW: Primary | ICD-10-CM

## 2020-11-11 PROCEDURE — 99203 OFFICE O/P NEW LOW 30 MIN: CPT | Performed by: PHYSICIAN ASSISTANT

## 2020-11-11 PROCEDURE — 20605 DRAIN/INJ JOINT/BURSA W/O US: CPT | Performed by: PHYSICIAN ASSISTANT

## 2020-11-11 RX ORDER — METHYLPREDNISOLONE ACETATE 80 MG/ML
80 INJECTION, SUSPENSION INTRA-ARTICULAR; INTRALESIONAL; INTRAMUSCULAR; SOFT TISSUE
Status: COMPLETED | OUTPATIENT
Start: 2020-11-11 | End: 2020-11-11

## 2020-11-11 RX ORDER — LIDOCAINE HYDROCHLORIDE 10 MG/ML
1 INJECTION, SOLUTION EPIDURAL; INFILTRATION; INTRACAUDAL; PERINEURAL
Status: COMPLETED | OUTPATIENT
Start: 2020-11-11 | End: 2020-11-11

## 2020-11-11 RX ADMIN — METHYLPREDNISOLONE ACETATE 80 MG: 80 INJECTION, SUSPENSION INTRA-ARTICULAR; INTRALESIONAL; INTRAMUSCULAR; SOFT TISSUE at 14:33

## 2020-11-11 RX ADMIN — LIDOCAINE HYDROCHLORIDE 1 ML: 10 INJECTION, SOLUTION EPIDURAL; INFILTRATION; INTRACAUDAL; PERINEURAL at 14:33

## 2020-11-11 NOTE — PROGRESS NOTES
New Patient Visit      Patient: Angelito Carter  YOB: 1935  Date of Encounter: 11/11/2020          History of Present Illness:     Angelito Carter is a 85 y.o. male evaluated today secondary to a 6-week history of an injury which patient states that he was attempting to pull on a lever when his hand slipped and he struck the posterior aspect of his elbow on a piece of wood trim.  Patient states that he was mildly painful and stiff for 1 to 2 weeks and he began to notice accumulation of fluid and problems at the posterior elbow the last 2 weeks.  Patient reports full range of motion now with minimal pain.  Patient describes occasional dull throbbing aching sensation upon placing his weight on his elbow and occasional full extension.  Patient denies any paresthesias.  Patient is very active and has had no significant functional capacity change           Patient Active Problem List   Diagnosis   • CA of prostate (CMS/HCC)   • Arteriosclerosis of coronary artery   • History of ischemic cardiomyopathy   • HTN (hypertension)   • HLD (hyperlipidemia)   • HX OF Paroxysmal a-fib   • HX OF Chronic renal insufficiency     Past Medical History:   Diagnosis Date   • Acute myocardial infarction (CMS/HCC)    • Adenocarcinoma (CMS/HCC)     prostate gland   • Arthritis    • Heart attack (CMS/HCC)    • Hx of radiation therapy    • Hypertension    • Prostate cancer (CMS/HCC)      Past Surgical History:   Procedure Laterality Date   • BIOPSY PROSTATE NEEDLE / PUNCH / INCISIONAL     • CORONARY ARTERY BYPASS GRAFT     • OTHER SURGICAL HISTORY      Coronary Artery Triple Arterial Byupass Graft   • PROSTATE SURGERY     • PROSTATECTOMY      Robotic-assisted   • VASECTOMY       Social History     Occupational History   • Not on file   Tobacco Use   • Smoking status: Never Smoker   • Smokeless tobacco: Never Used   Substance and Sexual Activity   • Alcohol use: Yes   • Drug use: Never   • Sexual activity: Defer      Social History  "    Social History Narrative   • Not on file     Family History   Problem Relation Age of Onset   • Kidney disease Father    • Heart disease Father    • Heart attack Father    • Kidney disease Mother    • Heart disease Mother      Current Outpatient Medications   Medication Sig Dispense Refill   • amLODIPine (NORVASC) 5 MG tablet Take 1 tablet by mouth Daily.     • aspirin 81 MG tablet Take 1 tablet by mouth Daily. 30 tablet 11   • Glucosamine-Chondroitin (OSTEO BI-FLEX REGULAR STRENGTH PO) Take  by mouth.     • HYDROcodone-acetaminophen (NORCO) 7.5-325 MG per tablet Take 1 tablet by mouth Every 6 (Six) Hours As Needed for Moderate Pain .     • lisinopril (PRINIVIL,ZESTRIL) 10 MG tablet Take 1 tablet by mouth Daily. 30 tablet 11   • metoprolol succinate XL (TOPROL-XL) 25 MG 24 hr tablet Take 1 tablet by mouth Daily. 30 tablet 11   • Omega-3 Fatty Acids (FISH OIL) 1000 MG capsule capsule Take 1,000 mg by mouth Daily With Breakfast.     • rosuvastatin (CRESTOR) 5 MG tablet Take 1 tablet by mouth Daily. 30 tablet 11   • tamsulosin (FLOMAX) 0.4 MG capsule 24 hr capsule Take 1 capsule by mouth Every Night.     • traMADol (ULTRAM) 50 MG tablet      • vitamin B-12 (CYANOCOBALAMIN) 1000 MCG tablet Take 1,000 mcg by mouth Daily.       No current facility-administered medications for this visit.      Allergies   Allergen Reactions   • Adhesive Tape Rash            Review of Systems   Constitution: Negative.   HENT: Negative.    Eyes: Negative.    Cardiovascular: Negative.    Respiratory: Negative.    Endocrine: Negative.    Hematologic/Lymphatic: Negative.    Skin: Negative.    Musculoskeletal:        Pertinent positives listed in HPI   Gastrointestinal: Negative.    Genitourinary: Negative.    Neurological: Negative.    Psychiatric/Behavioral: Negative.    Allergic/Immunologic: Negative.        Vitals:    11/11/20 1402   BP: 151/65   Pulse: 58   Temp: 97.8 °F (36.6 °C)   Weight: 90.7 kg (200 lb)   Height: 177.8 cm (70\") "     Patient's Body mass index is 28.7 kg/m². BMI is above normal parameters. Recommendations include: exercise counseling and nutrition counseling.    Angelito Carter  reports that he has never smoked. He has never used smokeless tobacco.. I have educated him on the risk of diseases from using tobacco products such as cancer, COPD and heart disease.              Physical Exam: 85 y.o. male .  General Appearance:    Well appearing, cooperative, in no acute distress.       Patient is alert and oriented x 3.            Musculoskeletal: Examination today of the patient's right elbow reveals full flexion and extension with no varus or valgus instability.  Patient has prominence and swelling to the bursa fluctuant freely movable area of swelling.  Patient has no surrounding erythema.  No ecchymosis.  Neurovascular status grossly intact right upper extremity.      Radiology:   3 views right elbow reveals no acute fractures or dislocations noted there is well-corticated ossific density at the olecranon and enthesophyte.  radial head and neck appear intact.  No other acute osseous abnormality.    Medium Joint Arthrocentesis: R olecranon bursa  Date/Time: 11/11/2020 2:33 PM  Consent given by: patient  Site marked: site marked  Timeout: Immediately prior to procedure a time out was called to verify the correct patient, procedure, equipment, support staff and site/side marked as required   Supporting Documentation  Indications: joint swelling   Procedure Details  Location: elbow - R olecranon bursa  Needle size: 18 G  Approach: posterolateral  Medications administered: 80 mg methylPREDNISolone acetate 80 MG/ML; 1 mL lidocaine PF 1% 1 %  Aspirate amount: 7 mL  Aspirate: bloody  Patient tolerance: patient tolerated the procedure well with no immediate complications          Assesment:    ICD-10-CM ICD-9-CM   1. Olecranon bursitis of right elbow  M70.21 726.33         Plan:    Patient tolerated the aspiration well and the patient was  provided with a compressive bandage below the neck.  He will slowly return back in his activity in 3 weeks for further evaluation of potential for recurrence.  Posttraumatic source of olecranon bursitis minimal concern for surgical invention.  Patient will return in 3 weeks for further evaluation.     This document was signed by IDEGO Parkinson November 11, 2020     CC: Yvonne Bajwa MD

## 2020-12-07 RX ORDER — METOPROLOL SUCCINATE 25 MG/1
TABLET, EXTENDED RELEASE ORAL
Qty: 90 TABLET | Refills: 10 | Status: SHIPPED | OUTPATIENT
Start: 2020-12-07 | End: 2022-04-14 | Stop reason: SDUPTHER

## 2020-12-15 ENCOUNTER — OFFICE VISIT (OUTPATIENT)
Dept: CARDIOLOGY | Facility: CLINIC | Age: 85
End: 2020-12-15

## 2020-12-15 VITALS
BODY MASS INDEX: 30.06 KG/M2 | DIASTOLIC BLOOD PRESSURE: 67 MMHG | HEIGHT: 70 IN | HEART RATE: 62 BPM | OXYGEN SATURATION: 95 % | WEIGHT: 210 LBS | SYSTOLIC BLOOD PRESSURE: 155 MMHG

## 2020-12-15 DIAGNOSIS — I10 ESSENTIAL HYPERTENSION: ICD-10-CM

## 2020-12-15 DIAGNOSIS — E78.2 MIXED HYPERLIPIDEMIA: ICD-10-CM

## 2020-12-15 DIAGNOSIS — I25.10 ARTERIOSCLEROSIS OF CORONARY ARTERY: Primary | ICD-10-CM

## 2020-12-15 PROCEDURE — 99214 OFFICE O/P EST MOD 30 MIN: CPT | Performed by: NURSE PRACTITIONER

## 2020-12-15 RX ORDER — AMLODIPINE BESYLATE 10 MG/1
10 TABLET ORAL DAILY
Qty: 30 TABLET | Refills: 11 | Status: SHIPPED | OUTPATIENT
Start: 2020-12-15 | End: 2021-10-11 | Stop reason: HOSPADM

## 2020-12-15 NOTE — PROGRESS NOTES
Subjective     Chief Complaint: ASCVD, Hypertension, and Hyperlipidemia    History of Present Illness   Angelito Carter is a 85 y.o. male who presents with a past medical history significant for ASCVD status post ACS with PCI to the LAD in March 2015 and coronary artery bypass surgery with internal mammary artery graft to the LAD, vein graft to the obtuse marginal branch and vein graft to the posterior distending artery, also in 2015, hypertension, hyperlipidemia, and questionable/unknown history of paroxysmal atrial fibrillation, currently in sinus rhythm and not anticoagulated.  He presents today for his regular follow-up visit.    Mr. Carter denies chest pain, palpitations, shortness of breath, lower extremity swelling.  He states he has been well since his last visit.    Current Outpatient Medications:   •  aspirin 81 MG tablet, Take 1 tablet by mouth Daily., Disp: 30 tablet, Rfl: 11  •  Glucosamine-Chondroitin (OSTEO BI-FLEX REGULAR STRENGTH PO), Take  by mouth., Disp: , Rfl:   •  HYDROcodone-acetaminophen (NORCO) 7.5-325 MG per tablet, Take 1 tablet by mouth Every 6 (Six) Hours As Needed for Moderate Pain ., Disp: , Rfl:   •  lisinopril (PRINIVIL,ZESTRIL) 10 MG tablet, Take 1 tablet by mouth Daily., Disp: 30 tablet, Rfl: 11  •  metoprolol succinate XL (TOPROL-XL) 25 MG 24 hr tablet, TAKE ONE TABLET BY MOUTH DAILY, Disp: 90 tablet, Rfl: 10  •  Omega-3 Fatty Acids (FISH OIL) 1000 MG capsule capsule, Take 1,000 mg by mouth Daily With Breakfast., Disp: , Rfl:   •  rosuvastatin (CRESTOR) 5 MG tablet, Take 1 tablet by mouth Daily., Disp: 30 tablet, Rfl: 11  •  tamsulosin (FLOMAX) 0.4 MG capsule 24 hr capsule, Take 1 capsule by mouth Every Night., Disp: , Rfl:   •  traMADol (ULTRAM) 50 MG tablet, , Disp: , Rfl:   •  vitamin B-12 (CYANOCOBALAMIN) 1000 MCG tablet, Take 1,000 mcg by mouth Daily., Disp: , Rfl:   •  amLODIPine (NORVASC) 10 MG tablet, Take 1 tablet by mouth Daily., Disp: 30 tablet, Rfl: 11     On this  "date:  The following portions of the patient's history were reviewed and updated as appropriate: allergies, current medications, past family history, past medical history, past social history, past surgical history and problem list.    Review of Systems   Constitution: Negative for malaise/fatigue.   Cardiovascular: Negative for chest pain, dyspnea on exertion, irregular heartbeat, leg swelling, near-syncope, orthopnea, palpitations, paroxysmal nocturnal dyspnea and syncope.   Respiratory: Negative for shortness of breath.    Hematologic/Lymphatic: Does not bruise/bleed easily.   Neurological: Negative for dizziness, light-headedness and weakness.         Objective     /67 (BP Location: Right arm, Patient Position: Sitting)   Pulse 62   Ht 177.8 cm (70\")   Wt 95.3 kg (210 lb)   SpO2 95%   BMI 30.13 kg/m²     Physical Exam  Constitutional:       Appearance: Normal appearance. He is well-developed.   HENT:      Head: Normocephalic and atraumatic.   Eyes:      Pupils: Pupils are equal, round, and reactive to light.   Neck:      Vascular: No JVD.   Cardiovascular:      Rate and Rhythm: Normal rate and regular rhythm.      Heart sounds: No murmur. No friction rub. No gallop.    Pulmonary:      Effort: Pulmonary effort is normal. No respiratory distress.      Breath sounds: Normal breath sounds. No wheezing or rales.   Skin:     General: Skin is warm and dry.   Neurological:      General: No focal deficit present.      Mental Status: He is alert and oriented to person, place, and time.   Psychiatric:         Mood and Affect: Mood normal.         Behavior: Behavior normal.         Procedures      Assessment/Plan       Diagnoses and all orders for this visit:    1. Arteriosclerosis of coronary artery (Primary)    2. Essential hypertension  -     amLODIPine (NORVASC) 10 MG tablet; Take 1 tablet by mouth Daily.  Dispense: 30 tablet; Refill: 11    3. Mixed hyperlipidemia          Plan of care was reviewed.  " Medication changes were made as noted below.  Patient agreed with plan of care.    ASCVD is stable.  Continue aspirin, lisinopril, metoprolol, and rosuvastatin.    Hypertension is not well controlled.  I have asked him to increase his amlodipine to 10 mg daily.  Monitor blood pressure at home.    With regard to dyslipidemia, I do not have any recent labs.  He does state that he had labs drawn recently at his primary care provider's office.  I will request these.    Return in about 6 months (around 6/15/2021).      REILLY Zazueta

## 2021-01-18 ENCOUNTER — IMMUNIZATION (OUTPATIENT)
Dept: VACCINE CLINIC | Facility: HOSPITAL | Age: 86
End: 2021-01-18

## 2021-01-18 PROCEDURE — 91300 HC SARSCOV02 VAC 30MCG/0.3ML IM: CPT | Performed by: FAMILY MEDICINE

## 2021-01-18 PROCEDURE — 0001A: CPT | Performed by: FAMILY MEDICINE

## 2021-02-08 ENCOUNTER — IMMUNIZATION (OUTPATIENT)
Dept: VACCINE CLINIC | Facility: HOSPITAL | Age: 86
End: 2021-02-08

## 2021-02-08 PROCEDURE — 91300 HC SARSCOV02 VAC 30MCG/0.3ML IM: CPT | Performed by: INTERNAL MEDICINE

## 2021-02-08 PROCEDURE — 0002A: CPT | Performed by: INTERNAL MEDICINE

## 2021-06-16 ENCOUNTER — OFFICE VISIT (OUTPATIENT)
Dept: CARDIOLOGY | Facility: CLINIC | Age: 86
End: 2021-06-16

## 2021-06-16 VITALS
HEART RATE: 69 BPM | SYSTOLIC BLOOD PRESSURE: 141 MMHG | HEIGHT: 70 IN | OXYGEN SATURATION: 95 % | DIASTOLIC BLOOD PRESSURE: 65 MMHG | BODY MASS INDEX: 30.06 KG/M2 | WEIGHT: 210 LBS

## 2021-06-16 DIAGNOSIS — I10 ESSENTIAL HYPERTENSION: ICD-10-CM

## 2021-06-16 DIAGNOSIS — E78.2 MIXED HYPERLIPIDEMIA: ICD-10-CM

## 2021-06-16 DIAGNOSIS — Z86.79 HISTORY OF ISCHEMIC CARDIOMYOPATHY: Chronic | ICD-10-CM

## 2021-06-16 DIAGNOSIS — R60.9 SWELLING: ICD-10-CM

## 2021-06-16 DIAGNOSIS — I25.10 ARTERIOSCLEROSIS OF CORONARY ARTERY: Primary | ICD-10-CM

## 2021-06-16 DIAGNOSIS — R06.09 DYSPNEA ON EXERTION: ICD-10-CM

## 2021-06-16 PROCEDURE — 99214 OFFICE O/P EST MOD 30 MIN: CPT | Performed by: NURSE PRACTITIONER

## 2021-06-16 RX ORDER — ERGOCALCIFEROL 1.25 MG/1
50000 CAPSULE ORAL WEEKLY
Status: ON HOLD | COMMUNITY
End: 2021-10-10

## 2021-06-16 RX ORDER — MAGNESIUM OXIDE 400 MG/1
400 TABLET ORAL DAILY
COMMUNITY

## 2021-06-16 RX ORDER — MULTIPLE VITAMINS W/ MINERALS TAB 9MG-400MCG
1 TAB ORAL DAILY
COMMUNITY

## 2021-06-16 NOTE — PROGRESS NOTES
"Chief Complaint  Coronary Artery Disease and Chest Pain    Subjective          Angelito Carter presents to [facility] for his usual follow-up.    History of Present Illness    Mr. Carter denies any chest pain or palpitations.  He does report some dyspnea on exertion and also complains of recent lower extremity swelling.  He states that it is there some days and not others.  He states that it is not associated with any shortness of breath.    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  He is able to complete all that he needs to complete on that farm and he has remained active.    Objective     Vital Signs:   /65 (BP Location: Right arm, Patient Position: Sitting)   Pulse 69   Ht 177.8 cm (70\")   Wt 95.3 kg (210 lb)   SpO2 95%   BMI 30.13 kg/m²       Physical Exam  Constitutional:       Appearance: Normal appearance. He is well-developed.   Cardiovascular:      Rate and Rhythm: Normal rate and regular rhythm.      Heart sounds: No murmur heard.   No friction rub. No gallop.    Pulmonary:      Effort: Pulmonary effort is normal. No respiratory distress.      Breath sounds: Normal breath sounds. No wheezing or rales.   Musculoskeletal:      Right lower le+ Pitting Edema present.      Left lower le+ Pitting Edema present.   Skin:     General: Skin is warm and dry.   Neurological:      Mental Status: He is alert and oriented to person, place, and time.   Psychiatric:         Mood and Affect: Mood normal.         Behavior: Behavior normal.          Result Review :                Current Outpatient Medications   Medication Sig Dispense Refill   • amLODIPine (NORVASC) 10 MG tablet Take 1 tablet by mouth Daily. 30 tablet 11   • aspirin 81 MG tablet Take 1 tablet by mouth Daily. 30 tablet 11   • Glucosamine-Chondroitin (OSTEO BI-FLEX REGULAR STRENGTH PO) Take  by mouth.     • HYDROcodone-acetaminophen (NORCO) 7.5-325 MG per tablet Take 1 tablet by mouth Every 6 " (Six) Hours As Needed for Moderate Pain .     • lisinopril (PRINIVIL,ZESTRIL) 10 MG tablet Take 1 tablet by mouth Daily. 30 tablet 11   • magnesium oxide (MAG-OX) 400 MG tablet Take 400 mg by mouth Daily.     • metoprolol succinate XL (TOPROL-XL) 25 MG 24 hr tablet TAKE ONE TABLET BY MOUTH DAILY 90 tablet 10   • multivitamin with minerals tablet tablet Take 1 tablet by mouth Daily.     • Omega-3 Fatty Acids (FISH OIL) 1000 MG capsule capsule Take 1,000 mg by mouth Daily With Breakfast.     • rosuvastatin (CRESTOR) 5 MG tablet Take 1 tablet by mouth Daily. 30 tablet 11   • tamsulosin (FLOMAX) 0.4 MG capsule 24 hr capsule Take 1 capsule by mouth Every Night.     • traMADol (ULTRAM) 50 MG tablet      • vitamin B-12 (CYANOCOBALAMIN) 1000 MCG tablet Take 1,000 mcg by mouth Daily.     • vitamin D (ERGOCALCIFEROL) 1.25 MG (46684 UT) capsule capsule Take 50,000 Units by mouth 1 (One) Time Per Week.       No current facility-administered medications for this visit.            Assessment and Plan    Problem List Items Addressed This Visit        Cardiac and Vasculature    HTN (hypertension) (Chronic)    HLD (hyperlipidemia) (Chronic)    Arteriosclerosis of coronary artery - Primary      Other Visit Diagnoses     Dyspnea on exertion        Relevant Orders    Adult Transthoracic Echo Complete W/ Cont if Necessary Per Protocol    Swelling        Relevant Orders    Adult Transthoracic Echo Complete W/ Cont if Necessary Per Protocol              Follow Up     The patient did not bring a list of medications with him.  He states that they are all the same.  I did call his wife to verify his medications and some supplements were added.  His heart medications remain unchanged.    Due to patient's new complaint of lower extremity swelling and some dyspnea on exertion, I have ordered an echocardiogram.    He does state that he has not had any recent labs drawn.  He reports that he is supposed to have them drawn in a few days.  Will  request from PCP.    Return in about 6 months (around 12/16/2021).    Patient was given instructions and counseling regarding his condition or for health maintenance advice. Please see specific information pulled into the AVS if appropriate.

## 2021-06-29 ENCOUNTER — HOSPITAL ENCOUNTER (OUTPATIENT)
Dept: CARDIOLOGY | Facility: HOSPITAL | Age: 86
Discharge: HOME | End: 2021-06-29
Admitting: NURSE PRACTITIONER

## 2021-06-29 DIAGNOSIS — R60.9 SWELLING: ICD-10-CM

## 2021-06-29 DIAGNOSIS — R06.09 DYSPNEA ON EXERTION: ICD-10-CM

## 2021-06-29 PROCEDURE — 93306 TTE W/DOPPLER COMPLETE: CPT | Performed by: INTERNAL MEDICINE

## 2021-06-29 PROCEDURE — 93306 TTE W/DOPPLER COMPLETE: CPT

## 2021-06-30 LAB
BH CV ECHO MEAS - % IVS THICK: -5.9 %
BH CV ECHO MEAS - % LVPW THICK: 24.8 %
BH CV ECHO MEAS - ACS: 2 CM
BH CV ECHO MEAS - AI DEC SLOPE: 157 CM/SEC^2
BH CV ECHO MEAS - AI MAX PG: 31.4 MMHG
BH CV ECHO MEAS - AI MAX VEL: 280 CM/SEC
BH CV ECHO MEAS - AI P1/2T: 522.4 MSEC
BH CV ECHO MEAS - AO MAX PG: 11 MMHG
BH CV ECHO MEAS - AO MEAN PG: 5 MMHG
BH CV ECHO MEAS - AO ROOT AREA (BSA CORRECTED): 1.7
BH CV ECHO MEAS - AO ROOT AREA: 10.8 CM^2
BH CV ECHO MEAS - AO ROOT DIAM: 3.7 CM
BH CV ECHO MEAS - AO V2 MAX: 166 CM/SEC
BH CV ECHO MEAS - AO V2 MEAN: 108 CM/SEC
BH CV ECHO MEAS - AO V2 VTI: 32.2 CM
BH CV ECHO MEAS - BSA(HAYCOCK): 2.2 M^2
BH CV ECHO MEAS - BSA: 2.1 M^2
BH CV ECHO MEAS - BZI_BMI: 30.1 KILOGRAMS/M^2
BH CV ECHO MEAS - BZI_METRIC_HEIGHT: 177.8 CM
BH CV ECHO MEAS - BZI_METRIC_WEIGHT: 95.3 KG
BH CV ECHO MEAS - EDV(CUBED): 167.3 ML
BH CV ECHO MEAS - EDV(MOD-SP4): 66.3 ML
BH CV ECHO MEAS - EDV(TEICH): 148 ML
BH CV ECHO MEAS - EF(CUBED): 51.3 %
BH CV ECHO MEAS - EF(MOD-SP4): 57.2 %
BH CV ECHO MEAS - EF(TEICH): 42.8 %
BH CV ECHO MEAS - ESV(CUBED): 81.5 ML
BH CV ECHO MEAS - ESV(MOD-SP4): 28.4 ML
BH CV ECHO MEAS - ESV(TEICH): 84.7 ML
BH CV ECHO MEAS - FS: 21.3 %
BH CV ECHO MEAS - IVS/LVPW: 0.97
BH CV ECHO MEAS - IVSD: 1 CM
BH CV ECHO MEAS - IVSS: 0.96 CM
BH CV ECHO MEAS - LA DIMENSION: 3.8 CM
BH CV ECHO MEAS - LA/AO: 1
BH CV ECHO MEAS - LV DIASTOLIC VOL/BSA (35-75): 31.1 ML/M^2
BH CV ECHO MEAS - LV MASS(C)D: 223.7 GRAMS
BH CV ECHO MEAS - LV MASS(C)DI: 105 GRAMS/M^2
BH CV ECHO MEAS - LV MASS(C)S: 172.6 GRAMS
BH CV ECHO MEAS - LV MASS(C)SI: 81 GRAMS/M^2
BH CV ECHO MEAS - LV SYSTOLIC VOL/BSA (12-30): 13.3 ML/M^2
BH CV ECHO MEAS - LVIDD: 5.5 CM
BH CV ECHO MEAS - LVIDS: 4.3 CM
BH CV ECHO MEAS - LVLD AP4: 7.1 CM
BH CV ECHO MEAS - LVLS AP4: 7.4 CM
BH CV ECHO MEAS - LVOT AREA (M): 3.5 CM^2
BH CV ECHO MEAS - LVOT AREA: 3.5 CM^2
BH CV ECHO MEAS - LVOT DIAM: 2.1 CM
BH CV ECHO MEAS - LVPWD: 1 CM
BH CV ECHO MEAS - LVPWS: 1.3 CM
BH CV ECHO MEAS - MV A MAX VEL: 93 CM/SEC
BH CV ECHO MEAS - MV DEC SLOPE: 106 CM/SEC^2
BH CV ECHO MEAS - MV E MAX VEL: 80.8 CM/SEC
BH CV ECHO MEAS - MV E/A: 0.87
BH CV ECHO MEAS - MV P1/2T MAX VEL: 261 CM/SEC
BH CV ECHO MEAS - MV P1/2T: 721.2 MSEC
BH CV ECHO MEAS - MVA P1/2T LCG: 0.84 CM^2
BH CV ECHO MEAS - MVA(P1/2T): 0.31 CM^2
BH CV ECHO MEAS - PA ACC TIME: 0.07 SEC
BH CV ECHO MEAS - PA PR(ACCEL): 45.7 MMHG
BH CV ECHO MEAS - RAP SYSTOLE: 10 MMHG
BH CV ECHO MEAS - RVSP: 23.7 MMHG
BH CV ECHO MEAS - SI(AO): 162.5 ML/M^2
BH CV ECHO MEAS - SI(CUBED): 40.3 ML/M^2
BH CV ECHO MEAS - SI(MOD-SP4): 17.8 ML/M^2
BH CV ECHO MEAS - SI(TEICH): 29.7 ML/M^2
BH CV ECHO MEAS - SV(AO): 346.2 ML
BH CV ECHO MEAS - SV(CUBED): 85.8 ML
BH CV ECHO MEAS - SV(MOD-SP4): 37.9 ML
BH CV ECHO MEAS - SV(TEICH): 63.4 ML
BH CV ECHO MEAS - TR MAX VEL: 185 CM/SEC
MAXIMAL PREDICTED HEART RATE: 135 BPM
STRESS TARGET HR: 115 BPM

## 2021-07-02 ENCOUNTER — TELEPHONE (OUTPATIENT)
Dept: CARDIOLOGY | Facility: CLINIC | Age: 86
End: 2021-07-02

## 2021-07-02 NOTE — TELEPHONE ENCOUNTER
----- Message from REILLY Silver sent at 7/2/2021  8:36 AM EDT -----  Please call patient.  Echocardiogram shows normal pumping function of your heart.  And, there are no significant valvular abnormalities.  All segments of the left ventricle contract normally.  Overall the echocardiogram is normal therefore it does not explain your increased lower extremity swelling or increased dyspnea on exertion.  Please make sure that if you are working outside that you stay well-hydrated, water is sufficient however on particularly hot days you may want to consider hydrating with 8 to 12 ounces of Gatorade or other sports drink with electrolytes.  If the symptoms worsen please contact the office.  Thanks, Nany

## 2021-07-21 DIAGNOSIS — M25.561 PAIN IN BOTH KNEES, UNSPECIFIED CHRONICITY: Primary | ICD-10-CM

## 2021-07-21 DIAGNOSIS — M25.562 PAIN IN BOTH KNEES, UNSPECIFIED CHRONICITY: Primary | ICD-10-CM

## 2021-07-22 ENCOUNTER — HOSPITAL ENCOUNTER (OUTPATIENT)
Dept: GENERAL RADIOLOGY | Facility: HOSPITAL | Age: 86
Discharge: HOME | End: 2021-07-22
Admitting: PHYSICIAN ASSISTANT

## 2021-07-22 ENCOUNTER — OFFICE VISIT (OUTPATIENT)
Dept: ORTHOPEDIC SURGERY | Facility: CLINIC | Age: 86
End: 2021-07-22

## 2021-07-22 VITALS — WEIGHT: 210.1 LBS | BODY MASS INDEX: 30.08 KG/M2 | HEIGHT: 70 IN

## 2021-07-22 DIAGNOSIS — M25.562 PAIN IN BOTH KNEES, UNSPECIFIED CHRONICITY: ICD-10-CM

## 2021-07-22 DIAGNOSIS — M17.0 BILATERAL PRIMARY OSTEOARTHRITIS OF KNEE: Primary | ICD-10-CM

## 2021-07-22 DIAGNOSIS — M25.561 PAIN IN BOTH KNEES, UNSPECIFIED CHRONICITY: ICD-10-CM

## 2021-07-22 PROCEDURE — 73562 X-RAY EXAM OF KNEE 3: CPT | Performed by: RADIOLOGY

## 2021-07-22 PROCEDURE — 99213 OFFICE O/P EST LOW 20 MIN: CPT | Performed by: PHYSICIAN ASSISTANT

## 2021-07-22 PROCEDURE — 20610 DRAIN/INJ JOINT/BURSA W/O US: CPT | Performed by: PHYSICIAN ASSISTANT

## 2021-07-22 PROCEDURE — 73562 X-RAY EXAM OF KNEE 3: CPT

## 2021-07-22 RX ORDER — LIDOCAINE HYDROCHLORIDE 10 MG/ML
5 INJECTION, SOLUTION EPIDURAL; INFILTRATION; INTRACAUDAL; PERINEURAL
Status: COMPLETED | OUTPATIENT
Start: 2021-07-22 | End: 2021-07-22

## 2021-07-22 RX ADMIN — LIDOCAINE HYDROCHLORIDE 5 ML: 10 INJECTION, SOLUTION EPIDURAL; INFILTRATION; INTRACAUDAL; PERINEURAL at 16:22

## 2021-07-22 NOTE — PROGRESS NOTES
[facility] Surgery New Patient Visit          Patient: Angelito Carter  YOB: 1935  Date of Encounter: 07/22/2021  PCP: Heidy Tabares APRN      Subjective     Chief Complaint   Patient presents with   • Left Knee - Pain, Edema   • Right Knee - Pain, Edema         Order  History of Present Illness:     Angelito Carter is a 86-year-old male with progressive onset worsening bilateral knee pain several years duration worse over the last 4 months.  Patient describes no specific injury.  He describes dull throbbing aching sensation to the medial aspect bilateral knees.  He has difficulty getting up and see position.  Patient has a history of renal insufficiency and she is unable to tolerate NSAIDs.  Patient reports mild swelling to the right knee which is much more symptomatic than the left knee.  Denies any paresthesias        Patient Active Problem List   Diagnosis   • CA of prostate (CMS/HCC)   • ASCVD (arteriosclerotic cardiovascular disease)   • History of ischemic cardiomyopathy   • HTN (hypertension)   • HLD (hyperlipidemia)   • HX OF Paroxysmal a-fib   • HX OF Chronic renal insufficiency     Past Medical History:   Diagnosis Date   • Acute myocardial infarction (CMS/HCC)    • Adenocarcinoma (CMS/HCC)     prostate gland   • Arthritis    • Heart attack (CMS/HCC)    • Hx of radiation therapy    • Hypertension    • Prostate cancer (CMS/HCC)      Past Surgical History:   Procedure Laterality Date   • BIOPSY PROSTATE NEEDLE / PUNCH / INCISIONAL     • CORONARY ARTERY BYPASS GRAFT     • OTHER SURGICAL HISTORY      Coronary Artery Triple Arterial Byupass Graft   • PROSTATE SURGERY     • PROSTATECTOMY      Robotic-assisted   • SKIN CANCER EXCISION Left     removed from left ear   • VASECTOMY       Social History     Occupational History   • Not on file   Tobacco Use   • Smoking status: Never Smoker   • Smokeless tobacco: Never Used   Substance and Sexual Activity   • Alcohol use: Yes     Comment: once  in awhile   • Drug use: Never   • Sexual activity: Defer    Angelito Carter  reports that he has never smoked. He has never used smokeless tobacco.. I have educated him on the risk of diseases from using tobacco products such as cancer, COPD and heart disease.          Social History     Social History Narrative   • Not on file     Family History   Problem Relation Age of Onset   • Kidney disease Father    • Heart disease Father    • Heart attack Father    • Kidney disease Mother    • Heart disease Mother      Current Outpatient Medications   Medication Sig Dispense Refill   • amLODIPine (NORVASC) 10 MG tablet Take 1 tablet by mouth Daily. 30 tablet 11   • aspirin 81 MG tablet Take 1 tablet by mouth Daily. 30 tablet 11   • Glucosamine-Chondroitin (OSTEO BI-FLEX REGULAR STRENGTH PO) Take  by mouth.     • HYDROcodone-acetaminophen (NORCO) 7.5-325 MG per tablet Take 1 tablet by mouth Every 6 (Six) Hours As Needed for Moderate Pain .     • lisinopril (PRINIVIL,ZESTRIL) 10 MG tablet Take 1 tablet by mouth Daily. 30 tablet 11   • magnesium oxide (MAG-OX) 400 MG tablet Take 400 mg by mouth Daily.     • metoprolol succinate XL (TOPROL-XL) 25 MG 24 hr tablet TAKE ONE TABLET BY MOUTH DAILY 90 tablet 10   • multivitamin with minerals tablet tablet Take 1 tablet by mouth Daily.     • Omega-3 Fatty Acids (FISH OIL) 1000 MG capsule capsule Take 1,000 mg by mouth Daily With Breakfast.     • rosuvastatin (CRESTOR) 5 MG tablet Take 1 tablet by mouth Daily. 30 tablet 11   • tamsulosin (FLOMAX) 0.4 MG capsule 24 hr capsule Take 1 capsule by mouth Every Night.     • traMADol (ULTRAM) 50 MG tablet      • vitamin B-12 (CYANOCOBALAMIN) 1000 MCG tablet Take 1,000 mcg by mouth Daily.     • vitamin D (ERGOCALCIFEROL) 1.25 MG (92633 UT) capsule capsule Take 50,000 Units by mouth 1 (One) Time Per Week.       No current facility-administered medications for this visit.     Allergies   Allergen Reactions   • Adhesive Tape Rash            Review of  "Systems   Constitutional: Negative.   HENT: Negative.    Eyes: Negative.    Cardiovascular: Negative.    Respiratory: Negative.    Endocrine: Negative.    Hematologic/Lymphatic: Negative.    Skin: Negative.    Musculoskeletal:        Pertinent positives listed in HPI   Gastrointestinal: Negative.    Genitourinary: Negative.    Neurological: Negative.    Psychiatric/Behavioral: Negative.    Allergic/Immunologic: Negative.          Objective      Vitals:    07/22/21 1546   Weight: 95.3 kg (210 lb 1.6 oz)   Height: 177.8 cm (70\")      Patient's Body mass index is 30.15 kg/m². indicating that he is obese (BMI >30). Obesity-related health conditions include the following: Listed in PMH. Obesity is unchanged. BMI is is above average; BMI management plan is completed. We discussed portion control and increasing exercise..      Physical Exam  Vitals and nursing note reviewed.   Constitutional:       General: He is not in acute distress.     Appearance: Normal appearance. He is not ill-appearing.   HENT:      Head: Normocephalic and atraumatic.      Right Ear: External ear normal.      Left Ear: External ear normal.      Nose: Nose normal.      Mouth/Throat:      Mouth: Mucous membranes are moist.      Pharynx: Oropharynx is clear.   Eyes:      Extraocular Movements: Extraocular movements intact.      Conjunctiva/sclera: Conjunctivae normal.      Pupils: Pupils are equal, round, and reactive to light.   Cardiovascular:      Rate and Rhythm: Normal rate.      Pulses: Normal pulses.   Pulmonary:      Effort: Pulmonary effort is normal.   Abdominal:      General: There is no distension.   Musculoskeletal:      Cervical back: Normal range of motion. No rigidity.      Comments: Bilateral knees examination today reveals medial joint space narrowing consistent with varus deformity with pseudolaxity upon valgus stress right knee.  Patellofemoral crepitus present.  Full flexion extension with mild effusion right knee versus scant " effusion left knee.  Cruciate exams intact.  Neurovascular status grossly intact.   Skin:     General: Skin is warm and dry.      Capillary Refill: Capillary refill takes less than 2 seconds.   Neurological:      General: No focal deficit present.      Mental Status: He is alert and oriented to person, place, and time.      Cranial Nerves: Cranial nerves are intact.   Psychiatric:         Mood and Affect: Mood normal.         Behavior: Behavior normal.         Radiology:      X-rays: 2 views AP/lateral standing bilateral knees reveals tricompartmental moderate to severe osteoarthritis.  Medial joint space collapse bilaterally.  No acute osseous abnormalities.  No acute fractures or dislocations noted        Assessment/Plan        ICD-10-CM ICD-9-CM   1. Bilateral primary osteoarthritis of knee  M17.0 715.16       86-year-old male with longstanding bilateral knee osteoarthritis.  As result of this as well as absence of any conservative treatment thus far the patient was provided with intra-articular steroid injection Zilretta right knee follow-up left knee expectantly.  The patient will return back in 4 weeks for further evaluation efficacy of conservative treatment.  We discussed possibility of exhausting all conservative treatment options before discussing total knee arthroplasty      Large Joint Arthrocentesis: L knee  Date/Time: 7/22/2021 4:22 PM  Consent given by: patient  Site marked: site marked  Timeout: Immediately prior to procedure a time out was called to verify the correct patient, procedure, equipment, support staff and site/side marked as required   Supporting Documentation  Indications: pain and diagnostic evaluation   Procedure Details  Location: knee - L knee  Needle size: 25 G  Approach: anterolateral  Medications administered: 5 mL lidocaine PF 1% 1 %; 32 mg Triamcinolone Acetonide 32 MG  Patient tolerance: patient tolerated the procedure well with no immediate complications    Large Joint  Arthrocentesis: R knee  Date/Time: 7/22/2021 4:22 PM  Consent given by: patient  Site marked: site marked  Supporting Documentation  Indications: pain and diagnostic evaluation   Procedure Details  Location: knee - R knee  Needle size: 25 G  Approach: anterolateral  Medications administered: 5 mL lidocaine PF 1% 1 %; 32 mg Triamcinolone Acetonide 32 MG  Patient tolerance: patient tolerated the procedure well with no immediate complications                      This document was signed by Washington Holt PA-C July 22, 2021     CC: Heidy Tabares APRN       EMR Dragon/Transcription disclaimer:  Part of this note may be completed utilizing the dragon speech recognition software. This electronic transcription/translation of spoken language to printed text may contain grammatical errors, random word insertions, pronoun errors, and incomplete sentences or occasional consequences of the system due to software limitations, ambient noise, and hardware issues.  Any questions or concerns about the content, text, or information contained within the body of this dictation should be directly addressed to the physician for clarification.

## 2021-08-19 ENCOUNTER — OFFICE VISIT (OUTPATIENT)
Dept: ORTHOPEDIC SURGERY | Facility: CLINIC | Age: 86
End: 2021-08-19

## 2021-08-19 VITALS — BODY MASS INDEX: 30.06 KG/M2 | TEMPERATURE: 98.5 F | HEIGHT: 70 IN | WEIGHT: 210 LBS

## 2021-08-19 DIAGNOSIS — M17.0 BILATERAL PRIMARY OSTEOARTHRITIS OF KNEE: Primary | ICD-10-CM

## 2021-08-19 PROCEDURE — 99213 OFFICE O/P EST LOW 20 MIN: CPT | Performed by: PHYSICIAN ASSISTANT

## 2021-08-19 NOTE — PROGRESS NOTES
[facility] [place]          Patient: Angelito Carter  YOB: 1935  Date of Encounter: 08/19/2021  PCP: Heidy Tabares APRN      Subjective     Chief Complaint   Patient presents with   • Right Knee - Follow-up, Pain   • Left Knee - Follow-up         Order  History of Present Illness:     Angelito Carter is a 86-year-old male with bilateral knee osteoarthritis several years duration worse over the last 4 months. Patient received bilateral intra-articular steroid injections at last office visit with near 90% improvement of pain in symptoms. Right knee still worse than the left and seems to be bothered more so with prolonged sedentary position such as being on a tractor for several hours. He reports no other new complaints. Denies any paresthesias        Patient Active Problem List   Diagnosis   • CA of prostate (CMS/HCC)   • ASCVD (arteriosclerotic cardiovascular disease)   • History of ischemic cardiomyopathy   • HTN (hypertension)   • HLD (hyperlipidemia)   • HX OF Paroxysmal a-fib   • HX OF Chronic renal insufficiency     Past Medical History:   Diagnosis Date   • Acute myocardial infarction (CMS/HCC)    • Adenocarcinoma (CMS/HCC)     prostate gland   • Arthritis    • Heart attack (CMS/HCC)    • Hx of radiation therapy    • Hypertension    • Prostate cancer (CMS/HCC)      Past Surgical History:   Procedure Laterality Date   • BIOPSY PROSTATE NEEDLE / PUNCH / INCISIONAL     • CORONARY ARTERY BYPASS GRAFT     • OTHER SURGICAL HISTORY      Coronary Artery Triple Arterial Byupass Graft   • PROSTATE SURGERY     • PROSTATECTOMY      Robotic-assisted   • SKIN CANCER EXCISION Left     removed from left ear   • VASECTOMY       Social History     Occupational History   • Not on file   Tobacco Use   • Smoking status: Never Smoker   • Smokeless tobacco: Never Used   Vaping Use   • Vaping Use: Never used   Substance and Sexual Activity   • Alcohol use: Yes     Comment: once in awhile   •  Drug use: Never   • Sexual activity: Defer    Angelito Carter  reports that he has never smoked. He has never used smokeless tobacco.. I have educated him on the risk of diseases from using tobacco products such as cancer, COPD and heart disease.          Social History     Social History Narrative   • Not on file     Family History   Problem Relation Age of Onset   • Kidney disease Father    • Heart disease Father    • Heart attack Father    • Kidney disease Mother    • Heart disease Mother      Current Outpatient Medications   Medication Sig Dispense Refill   • amLODIPine (NORVASC) 10 MG tablet Take 1 tablet by mouth Daily. 30 tablet 11   • aspirin 81 MG tablet Take 1 tablet by mouth Daily. 30 tablet 11   • Glucosamine-Chondroitin (OSTEO BI-FLEX REGULAR STRENGTH PO) Take  by mouth.     • HYDROcodone-acetaminophen (NORCO) 7.5-325 MG per tablet Take 1 tablet by mouth Every 6 (Six) Hours As Needed for Moderate Pain .     • lisinopril (PRINIVIL,ZESTRIL) 10 MG tablet Take 1 tablet by mouth Daily. 30 tablet 11   • magnesium oxide (MAG-OX) 400 MG tablet Take 400 mg by mouth Daily.     • metoprolol succinate XL (TOPROL-XL) 25 MG 24 hr tablet TAKE ONE TABLET BY MOUTH DAILY 90 tablet 10   • multivitamin with minerals tablet tablet Take 1 tablet by mouth Daily.     • Omega-3 Fatty Acids (FISH OIL) 1000 MG capsule capsule Take 1,000 mg by mouth Daily With Breakfast.     • rosuvastatin (CRESTOR) 5 MG tablet Take 1 tablet by mouth Daily. 30 tablet 11   • tamsulosin (FLOMAX) 0.4 MG capsule 24 hr capsule Take 1 capsule by mouth Every Night.     • traMADol (ULTRAM) 50 MG tablet      • vitamin B-12 (CYANOCOBALAMIN) 1000 MCG tablet Take 1,000 mcg by mouth Daily.     • vitamin D (ERGOCALCIFEROL) 1.25 MG (50979 UT) capsule capsule Take 50,000 Units by mouth 1 (One) Time Per Week.       No current facility-administered medications for this visit.     Allergies   Allergen Reactions   • Adhesive Tape Rash            Review of Systems  "  Constitutional: Negative.   HENT: Negative.    Eyes: Negative.    Cardiovascular: Negative.    Respiratory: Negative.    Endocrine: Negative.    Hematologic/Lymphatic: Negative.    Skin: Negative.    Musculoskeletal:        Pertinent positives listed in HPI   Gastrointestinal: Negative.    Genitourinary: Negative.    Neurological: Negative.    Psychiatric/Behavioral: Negative.    Allergic/Immunologic: Negative.          Objective      Vitals:    08/19/21 1006   Temp: 98.5 °F (36.9 °C)   Weight: 95.3 kg (210 lb)   Height: 177.8 cm (70\")     Patient's Body mass index is 30.13 kg/m². indicating that he is obese (BMI >30). Obesity-related health conditions include the following: Listed in PMH. Obesity is unchanged. BMI is is above average; BMI management plan is completed. We discussed portion control and increasing exercise..      Physical Exam  Vitals and nursing note reviewed.   Constitutional:       General: He is not in acute distress.     Appearance: Normal appearance. He is not ill-appearing.   HENT:      Head: Normocephalic and atraumatic.      Right Ear: External ear normal.      Left Ear: External ear normal.      Nose: Nose normal.      Mouth/Throat:      Mouth: Mucous membranes are moist.      Pharynx: Oropharynx is clear.   Eyes:      Extraocular Movements: Extraocular movements intact.      Conjunctiva/sclera: Conjunctivae normal.      Pupils: Pupils are equal, round, and reactive to light.   Cardiovascular:      Rate and Rhythm: Normal rate.      Pulses: Normal pulses.   Pulmonary:      Effort: Pulmonary effort is normal.   Abdominal:      General: There is no distension.   Musculoskeletal:      Cervical back: Normal range of motion. No rigidity.      Comments: Bilateral knees examination today reveals reduction of previous medial joint space narrowing consistent with varus deformity with pseudolaxity upon valgus stress right knee.  Patellofemoral crepitus present.  Full flexion extension with mild " effusion right knee versus scant effusion left knee.  Cruciate exams intact.  Neurovascular status grossly intact.   Skin:     General: Skin is warm and dry.      Capillary Refill: Capillary refill takes less than 2 seconds.   Neurological:      General: No focal deficit present.      Mental Status: He is alert and oriented to person, place, and time.      Cranial Nerves: Cranial nerves are intact.   Psychiatric:         Mood and Affect: Mood normal.         Behavior: Behavior normal.         Radiology:      X-rays: 2 views AP/lateral standing bilateral knees reveals tricompartmental moderate to severe osteoarthritis.  Medial joint space collapse bilaterally.  No acute osseous abnormalities.  No acute fractures or dislocations noted        Assessment/Plan        ICD-10-CM ICD-9-CM   1. Bilateral primary osteoarthritis of knee  M17.0 715.16         86-year-old male with longstanding bilateral knee osteoarthritis. Patient has responded well with Zilretta injections bilaterally. He continues to have approximate 90% improvement of pain symptoms with only mild pain upon prolonged sitting. Patient would like to continue to exhaust conservative treatment options with discussing surgical intervention. As result the patient will return back in 2 months for repeat effusion Zilretta versus discussions of viscosupplementation. We will evaluate at return visit in terms of which injection to proceed with.                      This document was signed by Washington Holt PA-C August 19, 2021     CC: Heidy Tabares APRN EMR Dragon/Transcription disclaimer:  Part of this note may be completed utilizing the dragon speech recognition software. This electronic transcription/translation of spoken language to printed text may contain grammatical errors, random word insertions, pronoun errors, and incomplete sentences or occasional consequences of the system due to software limitations, ambient noise, and hardware issues.  Any  questions or concerns about the content, text, or information contained within the body of this dictation should be directly addressed to the physician for clarification.

## 2021-10-08 ENCOUNTER — APPOINTMENT (OUTPATIENT)
Dept: CT IMAGING | Facility: HOSPITAL | Age: 86
End: 2021-10-08

## 2021-10-08 ENCOUNTER — APPOINTMENT (OUTPATIENT)
Dept: GENERAL RADIOLOGY | Facility: HOSPITAL | Age: 86
End: 2021-10-08

## 2021-10-08 ENCOUNTER — HOSPITAL ENCOUNTER (INPATIENT)
Facility: HOSPITAL | Age: 86
LOS: 3 days | Discharge: HOME | End: 2021-10-11
Attending: STUDENT IN AN ORGANIZED HEALTH CARE EDUCATION/TRAINING PROGRAM | Admitting: INTERNAL MEDICINE

## 2021-10-08 DIAGNOSIS — R10.9 ABDOMINAL PAIN, UNSPECIFIED ABDOMINAL LOCATION: ICD-10-CM

## 2021-10-08 DIAGNOSIS — R11.2 NON-INTRACTABLE VOMITING WITH NAUSEA, UNSPECIFIED VOMITING TYPE: Primary | ICD-10-CM

## 2021-10-08 PROBLEM — K66.8 INTRA-ABDOMINAL FREE AIR OF UNKNOWN ETIOLOGY: Status: ACTIVE | Noted: 2021-10-08

## 2021-10-08 LAB
ALBUMIN SERPL-MCNC: 4.3 G/DL (ref 3.5–5.2)
ALBUMIN/GLOB SERPL: 1.3 G/DL
ALP SERPL-CCNC: 81 U/L (ref 39–117)
ALT SERPL W P-5'-P-CCNC: 41 U/L (ref 1–41)
ANION GAP SERPL CALCULATED.3IONS-SCNC: 10.2 MMOL/L (ref 5–15)
AST SERPL-CCNC: 32 U/L (ref 1–40)
BASOPHILS # BLD AUTO: 0.02 10*3/MM3 (ref 0–0.2)
BASOPHILS NFR BLD AUTO: 0.2 % (ref 0–1.5)
BILIRUB SERPL-MCNC: 0.3 MG/DL (ref 0–1.2)
BUN SERPL-MCNC: 23 MG/DL (ref 8–23)
BUN/CREAT SERPL: 16.9 (ref 7–25)
CALCIUM SPEC-SCNC: 9.2 MG/DL (ref 8.6–10.5)
CHLORIDE SERPL-SCNC: 99 MMOL/L (ref 98–107)
CO2 SERPL-SCNC: 24.8 MMOL/L (ref 22–29)
CREAT SERPL-MCNC: 1.36 MG/DL (ref 0.76–1.27)
D-LACTATE SERPL-SCNC: 1.5 MMOL/L (ref 0.5–2)
DEPRECATED RDW RBC AUTO: 45.1 FL (ref 37–54)
EOSINOPHIL # BLD AUTO: 0.14 10*3/MM3 (ref 0–0.4)
EOSINOPHIL NFR BLD AUTO: 1.3 % (ref 0.3–6.2)
ERYTHROCYTE [DISTWIDTH] IN BLOOD BY AUTOMATED COUNT: 12.9 % (ref 12.3–15.4)
FLUAV RNA RESP QL NAA+PROBE: NOT DETECTED
FLUBV RNA RESP QL NAA+PROBE: NOT DETECTED
GFR SERPL CREATININE-BSD FRML MDRD: 50 ML/MIN/1.73
GLOBULIN UR ELPH-MCNC: 3.2 GM/DL
GLUCOSE BLDC GLUCOMTR-MCNC: 124 MG/DL (ref 70–130)
GLUCOSE SERPL-MCNC: 173 MG/DL (ref 65–99)
HCT VFR BLD AUTO: 41 % (ref 37.5–51)
HGB BLD-MCNC: 13.2 G/DL (ref 13–17.7)
IMM GRANULOCYTES # BLD AUTO: 0.05 10*3/MM3 (ref 0–0.05)
IMM GRANULOCYTES NFR BLD AUTO: 0.4 % (ref 0–0.5)
LIPASE SERPL-CCNC: 19 U/L (ref 13–60)
LYMPHOCYTES # BLD AUTO: 1.42 10*3/MM3 (ref 0.7–3.1)
LYMPHOCYTES NFR BLD AUTO: 12.7 % (ref 19.6–45.3)
MAGNESIUM SERPL-MCNC: 2 MG/DL (ref 1.6–2.4)
MCH RBC QN AUTO: 30.7 PG (ref 26.6–33)
MCHC RBC AUTO-ENTMCNC: 32.2 G/DL (ref 31.5–35.7)
MCV RBC AUTO: 95.3 FL (ref 79–97)
MONOCYTES # BLD AUTO: 1.18 10*3/MM3 (ref 0.1–0.9)
MONOCYTES NFR BLD AUTO: 10.6 % (ref 5–12)
NEUTROPHILS NFR BLD AUTO: 74.8 % (ref 42.7–76)
NEUTROPHILS NFR BLD AUTO: 8.35 10*3/MM3 (ref 1.7–7)
NRBC BLD AUTO-RTO: 0 /100 WBC (ref 0–0.2)
PLATELET # BLD AUTO: 254 10*3/MM3 (ref 140–450)
PMV BLD AUTO: 8.7 FL (ref 6–12)
POTASSIUM SERPL-SCNC: 4.3 MMOL/L (ref 3.5–5.2)
PROT SERPL-MCNC: 7.5 G/DL (ref 6–8.5)
RBC # BLD AUTO: 4.3 10*6/MM3 (ref 4.14–5.8)
SARS-COV-2 RNA RESP QL NAA+PROBE: NOT DETECTED
SODIUM SERPL-SCNC: 134 MMOL/L (ref 136–145)
WBC # BLD AUTO: 11.16 10*3/MM3 (ref 3.4–10.8)

## 2021-10-08 PROCEDURE — 80053 COMPREHEN METABOLIC PANEL: CPT | Performed by: STUDENT IN AN ORGANIZED HEALTH CARE EDUCATION/TRAINING PROGRAM

## 2021-10-08 PROCEDURE — 71045 X-RAY EXAM CHEST 1 VIEW: CPT

## 2021-10-08 PROCEDURE — 93005 ELECTROCARDIOGRAM TRACING: CPT | Performed by: STUDENT IN AN ORGANIZED HEALTH CARE EDUCATION/TRAINING PROGRAM

## 2021-10-08 PROCEDURE — 83735 ASSAY OF MAGNESIUM: CPT | Performed by: STUDENT IN AN ORGANIZED HEALTH CARE EDUCATION/TRAINING PROGRAM

## 2021-10-08 PROCEDURE — 25010000002 ONDANSETRON PER 1 MG: Performed by: STUDENT IN AN ORGANIZED HEALTH CARE EDUCATION/TRAINING PROGRAM

## 2021-10-08 PROCEDURE — 25010000002 HYDROMORPHONE 1 MG/ML SOLUTION: Performed by: STUDENT IN AN ORGANIZED HEALTH CARE EDUCATION/TRAINING PROGRAM

## 2021-10-08 PROCEDURE — 87636 SARSCOV2 & INF A&B AMP PRB: CPT | Performed by: STUDENT IN AN ORGANIZED HEALTH CARE EDUCATION/TRAINING PROGRAM

## 2021-10-08 PROCEDURE — 83690 ASSAY OF LIPASE: CPT | Performed by: STUDENT IN AN ORGANIZED HEALTH CARE EDUCATION/TRAINING PROGRAM

## 2021-10-08 PROCEDURE — 99223 1ST HOSP IP/OBS HIGH 75: CPT | Performed by: PHYSICIAN ASSISTANT

## 2021-10-08 PROCEDURE — 83605 ASSAY OF LACTIC ACID: CPT | Performed by: STUDENT IN AN ORGANIZED HEALTH CARE EDUCATION/TRAINING PROGRAM

## 2021-10-08 PROCEDURE — 94799 UNLISTED PULMONARY SVC/PX: CPT

## 2021-10-08 PROCEDURE — 93010 ELECTROCARDIOGRAM REPORT: CPT | Performed by: INTERNAL MEDICINE

## 2021-10-08 PROCEDURE — 74177 CT ABD & PELVIS W/CONTRAST: CPT

## 2021-10-08 PROCEDURE — 99284 EMERGENCY DEPT VISIT MOD MDM: CPT

## 2021-10-08 PROCEDURE — 74177 CT ABD & PELVIS W/CONTRAST: CPT | Performed by: RADIOLOGY

## 2021-10-08 PROCEDURE — 25010000002 HEPARIN (PORCINE) PER 1000 UNITS: Performed by: INTERNAL MEDICINE

## 2021-10-08 PROCEDURE — 25010000002 MORPHINE PER 10 MG: Performed by: STUDENT IN AN ORGANIZED HEALTH CARE EDUCATION/TRAINING PROGRAM

## 2021-10-08 PROCEDURE — 85025 COMPLETE CBC W/AUTO DIFF WBC: CPT | Performed by: STUDENT IN AN ORGANIZED HEALTH CARE EDUCATION/TRAINING PROGRAM

## 2021-10-08 PROCEDURE — 25010000002 CEFTRIAXONE PER 250 MG: Performed by: STUDENT IN AN ORGANIZED HEALTH CARE EDUCATION/TRAINING PROGRAM

## 2021-10-08 PROCEDURE — 82962 GLUCOSE BLOOD TEST: CPT

## 2021-10-08 PROCEDURE — 71045 X-RAY EXAM CHEST 1 VIEW: CPT | Performed by: RADIOLOGY

## 2021-10-08 PROCEDURE — 25010000002 IOPAMIDOL 61 % SOLUTION: Performed by: STUDENT IN AN ORGANIZED HEALTH CARE EDUCATION/TRAINING PROGRAM

## 2021-10-08 RX ORDER — HEPARIN SODIUM 5000 [USP'U]/ML
5000 INJECTION, SOLUTION INTRAVENOUS; SUBCUTANEOUS EVERY 12 HOURS SCHEDULED
Status: DISCONTINUED | OUTPATIENT
Start: 2021-10-08 | End: 2021-10-11 | Stop reason: HOSPADM

## 2021-10-08 RX ORDER — TRAMADOL HYDROCHLORIDE 50 MG/1
50 TABLET ORAL 2 TIMES DAILY
Status: DISCONTINUED | OUTPATIENT
Start: 2021-10-09 | End: 2021-10-11 | Stop reason: HOSPADM

## 2021-10-08 RX ORDER — ONDANSETRON 2 MG/ML
4 INJECTION INTRAMUSCULAR; INTRAVENOUS ONCE
Status: COMPLETED | OUTPATIENT
Start: 2021-10-08 | End: 2021-10-08

## 2021-10-08 RX ORDER — SODIUM CHLORIDE 0.9 % (FLUSH) 0.9 %
3-10 SYRINGE (ML) INJECTION AS NEEDED
Status: DISCONTINUED | OUTPATIENT
Start: 2021-10-08 | End: 2021-10-11 | Stop reason: HOSPADM

## 2021-10-08 RX ORDER — NITROGLYCERIN 0.4 MG/1
0.4 TABLET SUBLINGUAL
Status: DISCONTINUED | OUTPATIENT
Start: 2021-10-08 | End: 2021-10-11 | Stop reason: HOSPADM

## 2021-10-08 RX ORDER — SODIUM CHLORIDE 0.9 % (FLUSH) 0.9 %
3 SYRINGE (ML) INJECTION EVERY 12 HOURS SCHEDULED
Status: DISCONTINUED | OUTPATIENT
Start: 2021-10-08 | End: 2021-10-11 | Stop reason: HOSPADM

## 2021-10-08 RX ADMIN — METRONIDAZOLE 500 MG: 500 INJECTION, SOLUTION INTRAVENOUS at 23:00

## 2021-10-08 RX ADMIN — IOPAMIDOL 85 ML: 612 INJECTION, SOLUTION INTRAVENOUS at 18:51

## 2021-10-08 RX ADMIN — HYDROMORPHONE HYDROCHLORIDE 1 MG: 1 INJECTION, SOLUTION INTRAMUSCULAR; INTRAVENOUS; SUBCUTANEOUS at 19:46

## 2021-10-08 RX ADMIN — SODIUM CHLORIDE, PRESERVATIVE FREE 3 ML: 5 INJECTION INTRAVENOUS at 23:09

## 2021-10-08 RX ADMIN — MORPHINE SULFATE 4 MG: 4 INJECTION, SOLUTION INTRAMUSCULAR; INTRAVENOUS at 18:01

## 2021-10-08 RX ADMIN — SODIUM CHLORIDE 1000 ML: 9 INJECTION, SOLUTION INTRAVENOUS at 17:42

## 2021-10-08 RX ADMIN — ONDANSETRON 4 MG: 2 INJECTION INTRAMUSCULAR; INTRAVENOUS at 17:43

## 2021-10-08 RX ADMIN — HEPARIN SODIUM 5000 UNITS: 5000 INJECTION INTRAVENOUS; SUBCUTANEOUS at 23:09

## 2021-10-08 RX ADMIN — CEFTRIAXONE 1 G: 1 INJECTION, POWDER, FOR SOLUTION INTRAMUSCULAR; INTRAVENOUS at 19:48

## 2021-10-08 NOTE — ED PROVIDER NOTES
[facility]  emergency department encounter        Pt Name: Angelito Carter  MRN: [id number]  Birthdate 1935  Date of evaluation: 10/11/2021    Chief Complaint   Patient presents with   • Vomiting             HISTORY OF PRESENT ILLNESS:   Angelito Carter is a 86 y.o. male PMH HTN, MI, remote history of prostate cancer presents with primary complaint of vomiting and diarrhea onset 2 days ago.  Patient endorses mild epigastric versus diffuse abdominal pain.  He had a few episodes of light stools yesterday but only one stool today shortly after arrival that was of normal consistency.  Endorses yellow vomitus and mild cough with white sputum over the past few days.  Patient does not have any history of PUD and does not take daily nonsteroidal anti-inflammatories.  He denies history of constipation.  Denies fever chills congestion rhinorrhea chest pain shortness of breath.  Patient continues to pass gas.  No prior history of abdominal surgeries.    No other exacerbating or alleviating factors other than as noted above.  Severity: Moderate    PCP: Heidy Tabares APRN          REVIEW OF SYSTEMS:     Review of Systems   Constitutional: Negative for fever.   HENT: Negative for congestion and rhinorrhea.    Eyes: Negative for visual disturbance.   Respiratory: Positive for cough. Negative for shortness of breath.    Cardiovascular: Negative for chest pain.   Gastrointestinal: Positive for abdominal pain, diarrhea, nausea and vomiting.   Genitourinary: Negative for dysuria.   Musculoskeletal: Negative for myalgias.   Skin: Negative for rash.   Neurological: Negative for headaches.   Psychiatric/Behavioral: Negative for confusion.       Review of systems otherwise as per HPI.          PREVIOUS HISTORY:         Past Medical History:   Diagnosis Date   • Acute myocardial infarction (HCC)    • Adenocarcinoma (HCC)     prostate gland   • Arthritis    • Heart attack (HCC)    • Hx of radiation therapy    • Hypertension     • Prostate cancer (HCC)            Past Surgical History:   Procedure Laterality Date   • BIOPSY PROSTATE NEEDLE / PUNCH / INCISIONAL     • CORONARY ARTERY BYPASS GRAFT     • OTHER SURGICAL HISTORY      Coronary Artery Triple Arterial Byupass Graft   • PROSTATE SURGERY     • PROSTATECTOMY      Robotic-assisted   • SKIN CANCER EXCISION Left     removed from left ear   • VASECTOMY             Social History     Socioeconomic History   • Marital status:    Tobacco Use   • Smoking status: Never Smoker   • Smokeless tobacco: Never Used   Vaping Use   • Vaping Use: Never used   Substance and Sexual Activity   • Alcohol use: Yes     Comment: once in awhile   • Drug use: Never   • Sexual activity: Defer           Family History   Problem Relation Age of Onset   • Kidney disease Father    • Heart disease Father    • Heart attack Father    • Kidney disease Mother    • Heart disease Mother          Current Outpatient Medications   Medication Instructions   • amLODIPine (NORVASC) 10 mg, Oral, Daily   • aspirin 81 mg, Oral, Daily   • cyclobenzaprine (FLEXERIL) 5 mg, Oral, Nightly PRN   • Diclofenac Sodium (VOLTAREN) 2 g, Topical, 4 Times Daily PRN   • Glucosamine-Chondroitin (OSTEO BI-FLEX REGULAR STRENGTH PO) Oral   • lisinopril (PRINIVIL,ZESTRIL) 10 mg, Oral, Daily   • magnesium oxide (MAG-OX) 400 mg, Oral, Daily   • metoprolol succinate XL (TOPROL-XL) 25 MG 24 hr tablet TAKE ONE TABLET BY MOUTH DAILY   • multivitamin with minerals tablet tablet 1 tablet, Oral, Daily   • rosuvastatin (CRESTOR) 5 mg, Oral, Nightly   • tamsulosin (FLOMAX) 0.4 MG capsule 24 hr capsule 1 capsule, Oral, Nightly   • traMADol (ULTRAM) 50 mg, Oral, 2 Times Daily PRN   • triamcinolone (KENALOG) 0.5 % cream 1 application, Topical, 4 Times Daily, Prior to [facility] Admission, Patient was on:  Apply to rash    • vitamin B-12 (CYANOCOBALAMIN) 1,000 mcg, Oral, Daily   • Vitamin D3 2,000 Units, Oral, Daily         Allergies:  Adhesive  tape    Medications, allergies and past medical, surgical, family, and social history reviewed.        PHYSICAL EXAM:     Physical Exam  Vitals and nursing note reviewed.   Constitutional:       General: He is not in acute distress.     Appearance: Normal appearance. He is not ill-appearing or toxic-appearing.   HENT:      Head: Normocephalic and atraumatic.   Eyes:      Extraocular Movements: Extraocular movements intact.      Conjunctiva/sclera: Conjunctivae normal.   Cardiovascular:      Rate and Rhythm: Normal rate and regular rhythm.   Pulmonary:      Effort: Pulmonary effort is normal. No respiratory distress.   Abdominal:      General: There is no distension.      Palpations: Abdomen is soft.      Tenderness: There is abdominal tenderness. There is no guarding or rebound.      Comments: Mild diffuse abdominal tenderness with deep palpation, nonfocal no guarding or rebound   Musculoskeletal:         General: No deformity. Normal range of motion.      Cervical back: Normal range of motion. No rigidity.      Right lower leg: No edema.      Left lower leg: No edema.   Skin:     General: Skin is warm.   Neurological:      General: No focal deficit present.      Mental Status: He is alert and oriented to person, place, and time.   Psychiatric:         Mood and Affect: Mood normal.         Behavior: Behavior normal.             COMPLETED DIAGNOSTIC STUDIES AND INTERVENTIONS:     Lab Results (last 24 hours)     Procedure Component Value Units Date/Time    Clostridium Difficile Toxin - Stool, Per Rectum [[id number]] Collected: 10/10/21 1133    Specimen: Stool from Per Rectum Updated: 10/10/21 1233    Narrative:      The following orders were created for panel order Clostridium Difficile Toxin - Stool, Per Rectum.  Procedure                               Abnormality         Status                     ---------                               -----------         ------                     Clostridium Difficile  To...[[id number]]                      Final result                 Please view results for these tests on the individual orders.    Clostridium Difficile Toxin, PCR - Stool, Per Rectum [[id number]] Collected: 10/10/21 1133    Specimen: Stool from Per Rectum Updated: 10/10/21 1233     C. Difficile Toxins by PCR Negative     027 Toxin Presumptive Negative    Narrative:      For In Vitro Diagnostic use only.  027-NAP1-BI results are NOT intended to guide treatment. 027 typing is relative to PCR ribotyping and shown to be equivalent to B1 typing by restriction endonuclease analysis or NAP1 typing by pulse field gel electrophoresis.    Gastrointestinal Panel, PCR - Stool, Per Rectum [[id number]]  (Abnormal) Collected: 10/10/21 1133    Specimen: Stool from Per Rectum Updated: 10/10/21 1301     Campylobacter Not Detected     Plesiomonas shigelloides Not Detected     Salmonella Not Detected     Vibrio Not Detected     Vibrio cholerae Not Detected     Yersinia enterocolitica Not Detected     Enteroaggregative E. coli (EAEC) Not Detected     Enteropathogenic E. coli (EPEC) Not Detected     Enterotoxigenic E. coli (ETEC) lt/st Not Detected     Shiga-like toxin-producing E. coli (STEC) stx1/stx2 Not Detected     Shigella/Enteroinvasive E. coli (EIEC) Not Detected     Cryptosporidium Not Detected     Cyclospora cayetanensis Not Detected     Entamoeba histolytica Not Detected     Giardia lamblia Not Detected     Adenovirus F40/41 Detected     Astrovirus Not Detected     Norovirus GI/GII Not Detected     Rotavirus A Not Detected     Sapovirus (I, II, IV or V) Detected    Basic Metabolic Panel [[id number]]  (Normal) Collected: 10/11/21 0301    Specimen: Blood Updated: 10/11/21 0418     Glucose 91 mg/dL      BUN 18 mg/dL      Creatinine 1.14 mg/dL      Sodium 136 mmol/L      Potassium 4.0 mmol/L      Chloride 102 mmol/L      CO2 24.8 mmol/L      Calcium 8.6 mg/dL      eGFR Non African Amer 61 mL/min/1.73      BUN/Creatinine Ratio 15.8      Anion Gap 9.2 mmol/L     Narrative:      GFR Normal >60  Chronic Kidney Disease <60  Kidney Failure <15              CT Abdomen Pelvis Without Contrast   Final Result   1.  Interval resolution of portal venous gas and mesenteric venous gas.   No pneumoperitoneum.   2.  Improvement in bowel gas pattern with persistent but improved   changes of mild ileus. No bowel obstruction.   3.  No segments of abnormal bowel wall thickening identified on today's   exam.   4.  Bibasilar atelectasis. Stable diverticulosis without diverticulitis.   5.  Other incidental and nonacute findings as above appear stable.       This report was finalized on 10/10/2021 3:56 PM by Dr. Yunier Richardson MD.          XR Abdomen Flat & Upright   Final Result   1.  No radiographic evidence of free air.   2.  Mild ileus pattern.   3.  No bowel obstruction.       This report was finalized on 10/9/2021 11:38 AM by Dr. Yunier Richardson MD.          XR Chest PA & Lateral   Final Result     Mild linear atelectasis. Otherwise stable chest with no acute   cardiopulmonary findings.       This report was finalized on 10/9/2021 11:37 AM by Dr. Yunier Richardson MD.          XR Chest AP   Final Result   1.  Cardiomegaly and changes of CABG noted.   2.  Otherwise no acute cardiopulmonary findings identified.       This report was finalized on 10/8/2021 8:18 PM by Dr. Yunier Richardson MD.          CT Abdomen Pelvis With Contrast   Final Result   1.  Presence of portal venous gas and mesenteric venous gas with   generalized ileus bowel gas pattern and segments of mild wall thickening   noted. Findings are indicative of bowel wall compromise but no large   widespread pneumoperitoneum or ascites is noted.   2.  No bowel obstruction is identified.   3.  Cardiomegaly.   4.  Bibasilar atelectasis.   5.  Gastric distention.   6.  Diverticulosis without diverticulitis.   7.  Other nonacute findings above.       Findings communicated to Dr. Rodríguez, referring ER doctor,  at 7:15 PM on   10/08/2021.       This report was finalized on 10/8/2021 7:18 PM by Dr. Yunier Richardson MD.                New Medications Ordered This Visit   Medications   • sodium chloride 0.9 % bolus 1,000 mL   • ondansetron (ZOFRAN) injection 4 mg   • morphine injection 4 mg   • iopamidol (ISOVUE-300) 61 % injection 100 mL   • HYDROmorphone (DILAUDID) injection 1 mg   • metroNIDAZOLE (FLAGYL) 500 mg/100mL IVPB   • sodium chloride 0.9 % flush 3 mL   • sodium chloride 0.9 % flush 3-10 mL   • heparin (porcine) 5000 UNIT/ML injection 5,000 Units   • nitroglycerin (NITROSTAT) SL tablet 0.4 mg   • traMADol (ULTRAM) tablet 50 mg   • lactobacillus acidophilus (RISAQUAD) capsule 1 capsule   • cefepime 2 gm IVPB in 100 ml NS (VTB)   • cefepime 2 gm IVPB in 100 ml NS (VTB)   • ondansetron (ZOFRAN) injection 4 mg   • famotidine (PEPCID) injection 20 mg   • metoprolol succinate XL (TOPROL-XL) 24 hr tablet 25 mg   • tamsulosin (FLOMAX) 24 hr capsule 0.4 mg   • cyclobenzaprine (FLEXERIL) tablet 5 mg   • Diclofenac Sodium (VOLTAREN) 1 % gel 2 g   • rosuvastatin (CRESTOR) tablet 5 mg   • sodium chloride 0.9 % flush 10 mL   • sodium chloride 0.9 % flush 10 mL         Procedures            MEDICAL DECISION-MAKING AND ED COURSE:     ED Course as of 10/11/21 0714   Fri Oct 08, 2021   1750 MDM: 86-year-old male with epigastric abdominal pain and mild diffuse abdominal tenderness to deep palpation.  Primary complaint of nausea vomiting diarrhea onset 2 days ago, diarrhea has resolved.  Endorses cough that started a few days ago as well but is not significant.  We will check him for Covid, baseline labs and CT abdomen pelvis with concern for infectious / inflammatory process, rule out abscess, mass or other surgical process. [KP]   1816 COVID19: Not Detected [KP]   1817 Creatinine(!): 1.36 [KP]   1817 Creatinine baseline [KP]   1858 Signout to Dr. Burks: 86 male here with vomiting diarrhea for 2 days, abdominal pain predominant  epigastric with associated diffuse tenderness to deep palpation.  Work-up pending CT scan abdomen pelvis. [KP]   1928 IMPRESSION:  1.  Presence of portal venous gas and mesenteric venous gas with  generalized ileus bowel gas pattern and segments of mild wall thickening  noted. Findings are indicative of bowel wall compromise but no large  widespread pneumoperitoneum or ascites is noted.  2.  No bowel obstruction is identified.  3.  Cardiomegaly.  4.  Bibasilar atelectasis.  5.  Gastric distention.  6.  Diverticulosis without diverticulitis. [KP]   1929 Discussed case with Dr. Mustafa, general surgery. Recommends abx and will see in AM. Pt admitted to Dr. Hale, hospitalist. [KP]   2020 EKG interpretation, ventricular rate 69, , QRS 98, QTc 430, normal sinus rhythm, no ST elevation. [JM]      ED Course User Index  [JM] Stalin Burks,   [KP] Yunier Rodríguez MD     ?      FINAL IMPRESSION:       1. Non-intractable vomiting with nausea, unspecified vomiting type    2. Abdominal pain, unspecified abdominal location         The complaints listed here are new problems to this examiner.      FOLLOW-UP  No follow-up provider specified.    DISPOSITION  ED Disposition     ED Disposition Condition Comment    Decision to Admit  Level of Care: Progressive Care [20]   Diagnosis: Intra-abdominal free air of unknown etiology [[id number]]   Certification: I Certify That Inpatient Hospital Services Are Medically Necessary For Greater Than 2 Midnights                  This care is provided during an unprecedented national emergency due to the Novel Coronavirus (COVID-19). COVID-19 infections and transmission risks place heavy strains on healthcare resources. As this pandemic evolves, the Hospital and providers strive to respond fluidly, to remain operational, and to provide care relative to available resources and information. Outcomes are unpredictable and treatments are without well-defined guidelines. Further, the impact  of COVID-19 on all aspects of emergency care, including the impact to patients seeking care for reasons other than COVID-19, is unavoidable during this national emergency.    This note was dictated using a [id number] tool. Occasional wrong-word or 'sound-a-like' substitutions may have occurred due to the inherent limitations of voice recognition software. ?Read the chart carefully and recognize, using context, where substitutions have occurred.    Stalin Burks DO  07:14 EDT  10/11/2021             Yunier Rodríguez MD  10/08/21 1956       Stalin Burks DO  10/11/21 0715

## 2021-10-09 ENCOUNTER — APPOINTMENT (OUTPATIENT)
Dept: GENERAL RADIOLOGY | Facility: HOSPITAL | Age: 86
End: 2021-10-09

## 2021-10-09 LAB
ALBUMIN SERPL-MCNC: 4.12 G/DL (ref 3.5–5.2)
ALBUMIN/GLOB SERPL: 1.3 G/DL
ALP SERPL-CCNC: 77 U/L (ref 39–117)
ALT SERPL W P-5'-P-CCNC: 39 U/L (ref 1–41)
ANION GAP SERPL CALCULATED.3IONS-SCNC: 12.6 MMOL/L (ref 5–15)
AST SERPL-CCNC: 29 U/L (ref 1–40)
BASOPHILS # BLD AUTO: 0.01 10*3/MM3 (ref 0–0.2)
BASOPHILS NFR BLD AUTO: 0.1 % (ref 0–1.5)
BILIRUB SERPL-MCNC: 0.2 MG/DL (ref 0–1.2)
BILIRUB UR QL STRIP: NEGATIVE
BUN SERPL-MCNC: 24 MG/DL (ref 8–23)
BUN/CREAT SERPL: 19.4 (ref 7–25)
CALCIUM SPEC-SCNC: 8.8 MG/DL (ref 8.6–10.5)
CHLORIDE SERPL-SCNC: 98 MMOL/L (ref 98–107)
CLARITY UR: CLEAR
CO2 SERPL-SCNC: 22.4 MMOL/L (ref 22–29)
COLOR UR: YELLOW
CREAT SERPL-MCNC: 1.24 MG/DL (ref 0.76–1.27)
CRP SERPL-MCNC: 1.35 MG/DL (ref 0–0.5)
DEPRECATED RDW RBC AUTO: 46.1 FL (ref 37–54)
EOSINOPHIL # BLD AUTO: 0.01 10*3/MM3 (ref 0–0.4)
EOSINOPHIL NFR BLD AUTO: 0.1 % (ref 0.3–6.2)
ERYTHROCYTE [DISTWIDTH] IN BLOOD BY AUTOMATED COUNT: 12.9 % (ref 12.3–15.4)
GFR SERPL CREATININE-BSD FRML MDRD: 55 ML/MIN/1.73
GLOBULIN UR ELPH-MCNC: 3.2 GM/DL
GLUCOSE SERPL-MCNC: 132 MG/DL (ref 65–99)
GLUCOSE UR STRIP-MCNC: NEGATIVE MG/DL
HBA1C MFR BLD: 5.7 % (ref 4.8–5.6)
HCT VFR BLD AUTO: 43.5 % (ref 37.5–51)
HGB BLD-MCNC: 13.7 G/DL (ref 13–17.7)
HGB UR QL STRIP.AUTO: NEGATIVE
IMM GRANULOCYTES # BLD AUTO: 0.05 10*3/MM3 (ref 0–0.05)
IMM GRANULOCYTES NFR BLD AUTO: 0.4 % (ref 0–0.5)
KETONES UR QL STRIP: NEGATIVE
LEUKOCYTE ESTERASE UR QL STRIP.AUTO: NEGATIVE
LYMPHOCYTES # BLD AUTO: 0.41 10*3/MM3 (ref 0.7–3.1)
LYMPHOCYTES NFR BLD AUTO: 3.2 % (ref 19.6–45.3)
MAGNESIUM SERPL-MCNC: 2 MG/DL (ref 1.6–2.4)
MCH RBC QN AUTO: 30.4 PG (ref 26.6–33)
MCHC RBC AUTO-ENTMCNC: 31.5 G/DL (ref 31.5–35.7)
MCV RBC AUTO: 96.5 FL (ref 79–97)
MONOCYTES # BLD AUTO: 0.85 10*3/MM3 (ref 0.1–0.9)
MONOCYTES NFR BLD AUTO: 6.7 % (ref 5–12)
NEUTROPHILS NFR BLD AUTO: 11.41 10*3/MM3 (ref 1.7–7)
NEUTROPHILS NFR BLD AUTO: 89.5 % (ref 42.7–76)
NITRITE UR QL STRIP: NEGATIVE
NRBC BLD AUTO-RTO: 0 /100 WBC (ref 0–0.2)
PH UR STRIP.AUTO: <=5 [PH] (ref 5–8)
PLATELET # BLD AUTO: 249 10*3/MM3 (ref 140–450)
PMV BLD AUTO: 9.4 FL (ref 6–12)
POTASSIUM SERPL-SCNC: 4.4 MMOL/L (ref 3.5–5.2)
PROT SERPL-MCNC: 7.3 G/DL (ref 6–8.5)
PROT UR QL STRIP: NEGATIVE
RBC # BLD AUTO: 4.51 10*6/MM3 (ref 4.14–5.8)
SODIUM SERPL-SCNC: 133 MMOL/L (ref 136–145)
SP GR UR STRIP: >1.03 (ref 1–1.03)
TSH SERPL DL<=0.05 MIU/L-ACNC: 1.17 UIU/ML (ref 0.27–4.2)
UROBILINOGEN UR QL STRIP: ABNORMAL
WBC # BLD AUTO: 12.74 10*3/MM3 (ref 3.4–10.8)

## 2021-10-09 PROCEDURE — 80053 COMPREHEN METABOLIC PANEL: CPT | Performed by: PHYSICIAN ASSISTANT

## 2021-10-09 PROCEDURE — 25010000002 CEFEPIME PER 500 MG: Performed by: PHYSICIAN ASSISTANT

## 2021-10-09 PROCEDURE — 83735 ASSAY OF MAGNESIUM: CPT | Performed by: PHYSICIAN ASSISTANT

## 2021-10-09 PROCEDURE — 94799 UNLISTED PULMONARY SVC/PX: CPT

## 2021-10-09 PROCEDURE — 83036 HEMOGLOBIN GLYCOSYLATED A1C: CPT | Performed by: PHYSICIAN ASSISTANT

## 2021-10-09 PROCEDURE — 81003 URINALYSIS AUTO W/O SCOPE: CPT | Performed by: PHYSICIAN ASSISTANT

## 2021-10-09 PROCEDURE — 86140 C-REACTIVE PROTEIN: CPT | Performed by: PHYSICIAN ASSISTANT

## 2021-10-09 PROCEDURE — 25010000002 HEPARIN (PORCINE) PER 1000 UNITS: Performed by: INTERNAL MEDICINE

## 2021-10-09 PROCEDURE — 87040 BLOOD CULTURE FOR BACTERIA: CPT | Performed by: PHYSICIAN ASSISTANT

## 2021-10-09 PROCEDURE — 99233 SBSQ HOSP IP/OBS HIGH 50: CPT | Performed by: STUDENT IN AN ORGANIZED HEALTH CARE EDUCATION/TRAINING PROGRAM

## 2021-10-09 PROCEDURE — 74019 RADEX ABDOMEN 2 VIEWS: CPT

## 2021-10-09 PROCEDURE — 74022 RADEX COMPL AQT ABD SERIES: CPT | Performed by: RADIOLOGY

## 2021-10-09 PROCEDURE — 84443 ASSAY THYROID STIM HORMONE: CPT | Performed by: PHYSICIAN ASSISTANT

## 2021-10-09 PROCEDURE — 71046 X-RAY EXAM CHEST 2 VIEWS: CPT

## 2021-10-09 PROCEDURE — 99221 1ST HOSP IP/OBS SF/LOW 40: CPT | Performed by: SURGERY

## 2021-10-09 PROCEDURE — 85025 COMPLETE CBC W/AUTO DIFF WBC: CPT | Performed by: PHYSICIAN ASSISTANT

## 2021-10-09 RX ORDER — ROSUVASTATIN CALCIUM 10 MG/1
5 TABLET, COATED ORAL DAILY
Status: DISCONTINUED | OUTPATIENT
Start: 2021-10-10 | End: 2021-10-10

## 2021-10-09 RX ORDER — L.ACID,PARA/B.BIFIDUM/S.THERM 8B CELL
1 CAPSULE ORAL DAILY
Status: DISCONTINUED | OUTPATIENT
Start: 2021-10-09 | End: 2021-10-11 | Stop reason: HOSPADM

## 2021-10-09 RX ORDER — TAMSULOSIN HYDROCHLORIDE 0.4 MG/1
0.4 CAPSULE ORAL NIGHTLY
Status: DISCONTINUED | OUTPATIENT
Start: 2021-10-09 | End: 2021-10-11 | Stop reason: HOSPADM

## 2021-10-09 RX ORDER — FAMOTIDINE 10 MG/ML
20 INJECTION, SOLUTION INTRAVENOUS DAILY
Status: DISCONTINUED | OUTPATIENT
Start: 2021-10-09 | End: 2021-10-11 | Stop reason: HOSPADM

## 2021-10-09 RX ORDER — METOPROLOL SUCCINATE 25 MG/1
25 TABLET, EXTENDED RELEASE ORAL DAILY
Status: DISCONTINUED | OUTPATIENT
Start: 2021-10-10 | End: 2021-10-11 | Stop reason: HOSPADM

## 2021-10-09 RX ORDER — ONDANSETRON 2 MG/ML
4 INJECTION INTRAMUSCULAR; INTRAVENOUS EVERY 6 HOURS PRN
Status: DISCONTINUED | OUTPATIENT
Start: 2021-10-09 | End: 2021-10-11 | Stop reason: HOSPADM

## 2021-10-09 RX ADMIN — CEFEPIME HYDROCHLORIDE 2 G: 2 INJECTION, POWDER, FOR SOLUTION INTRAVENOUS at 20:36

## 2021-10-09 RX ADMIN — HEPARIN SODIUM 5000 UNITS: 5000 INJECTION INTRAVENOUS; SUBCUTANEOUS at 08:36

## 2021-10-09 RX ADMIN — CEFEPIME HYDROCHLORIDE 2 G: 2 INJECTION, POWDER, FOR SOLUTION INTRAVENOUS at 05:09

## 2021-10-09 RX ADMIN — TRAMADOL HYDROCHLORIDE 50 MG: 50 TABLET, FILM COATED ORAL at 20:39

## 2021-10-09 RX ADMIN — CEFEPIME HYDROCHLORIDE 2 G: 2 INJECTION, POWDER, FOR SOLUTION INTRAVENOUS at 08:31

## 2021-10-09 RX ADMIN — HEPARIN SODIUM 5000 UNITS: 5000 INJECTION INTRAVENOUS; SUBCUTANEOUS at 20:30

## 2021-10-09 RX ADMIN — METRONIDAZOLE 500 MG: 500 INJECTION, SOLUTION INTRAVENOUS at 11:22

## 2021-10-09 RX ADMIN — SODIUM CHLORIDE, PRESERVATIVE FREE 3 ML: 5 INJECTION INTRAVENOUS at 08:40

## 2021-10-09 RX ADMIN — METRONIDAZOLE 500 MG: 500 INJECTION, SOLUTION INTRAVENOUS at 05:10

## 2021-10-09 RX ADMIN — SODIUM CHLORIDE, PRESERVATIVE FREE 3 ML: 5 INJECTION INTRAVENOUS at 20:40

## 2021-10-09 RX ADMIN — METRONIDAZOLE 500 MG: 500 INJECTION, SOLUTION INTRAVENOUS at 20:33

## 2021-10-09 RX ADMIN — FAMOTIDINE 20 MG: 10 INJECTION INTRAVENOUS at 08:25

## 2021-10-09 NOTE — CONSULTS
Consulting physician:  Dr. Mustafa    Referring physician: Dr. Hale    Date of consultation: 10/09/21     Chief complaint abdominal pain, portal venous gas    Subjective     Patient is a 86 y.o. male who presented to the emergency room with a 36-hour history of abdominal pain, vomiting and diarrhea.  Patient states that this started on Thursday and became progressively worse.  Patient stated that between the diarrhea and the vomiting he did not know whether to sit on the toilet or to lean over it.  After he stopped vomiting he started having dry heaves which he felt contributed to the abdominal pain.  This became worse and he presented to the emergency room yesterday evening.  He denies fever or chills.  He denies a history of atrial fibrillation.  Patient has not had any additional diarrhea or vomiting since last night.  CT scan of the abdomen pelvis reported mesenteric and portal venous gas without any evidence of pneumatosis or perforation.  Patient states that he currently is having minimal pain and has passed a little gas today      Review of Systems  Review of Systems - General ROS: negative for - weight loss  Psychological ROS: negative for - behavioral disorder  Ophthalmic ROS: negative for - dry eyes  ENT ROS: negative for - vertigo or vocal changes  Hematological and Lymphatic ROS: negative for - swollen lymph nodes, DVT, PE.   Respiratory ROS: negative for - sputum changes or stridor.  Cardiovascular ROS: negative for - irregular heartbeat or murmur  Gastrointestinal ROS: Positive for the diarrhea  Genito-Urinary ROS: negative for - hematuria or incontinence  Musculoskeletal ROS: negative for - gait disturbance      History  Past Medical History:   Diagnosis Date   • Acute myocardial infarction (HCC)    • Adenocarcinoma (HCC)     prostate gland   • Arthritis    • Heart attack (HCC)    • Hx of radiation therapy    • Hypertension    • Prostate cancer (HCC)      Past Surgical History:   Procedure  Laterality Date   • BIOPSY PROSTATE NEEDLE / PUNCH / INCISIONAL     • CORONARY ARTERY BYPASS GRAFT     • OTHER SURGICAL HISTORY      Coronary Artery Triple Arterial Byupass Graft   • PROSTATE SURGERY     • PROSTATECTOMY      Robotic-assisted   • SKIN CANCER EXCISION Left     removed from left ear   • VASECTOMY       Family History   Problem Relation Age of Onset   • Kidney disease Father    • Heart disease Father    • Heart attack Father    • Kidney disease Mother    • Heart disease Mother      Social History   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 30 tablet 11    • aspirin 81 MG tablet Take 1 tablet by mouth Daily. 30 tablet 11    • Glucosamine-Chondroitin (OSTEO BI-FLEX REGULAR STRENGTH PO) Take  by mouth.      • HYDROcodone-acetaminophen (NORCO) 7.5-325 MG per tablet Take 1 tablet by mouth Every 6 (Six) Hours As Needed for Moderate Pain .      • lisinopril (PRINIVIL,ZESTRIL) 10 MG tablet Take 1 tablet by mouth Daily. 30 tablet 11    • magnesium oxide (MAG-OX) 400 MG tablet Take 400 mg by mouth Daily.      • metoprolol succinate XL (TOPROL-XL) 25 MG 24 hr tablet TAKE ONE TABLET BY MOUTH DAILY 90 tablet 10    • multivitamin with minerals tablet tablet Take 1 tablet by mouth Daily.      • Omega-3 Fatty Acids (FISH OIL) 1000 MG capsule capsule Take 1,000 mg by mouth Daily With Breakfast.      • rosuvastatin (CRESTOR) 5 MG tablet Take 1 tablet by mouth Daily. 30 tablet 11    • tamsulosin (FLOMAX) 0.4 MG capsule 24 hr capsule Take 1 capsule by mouth Every Night.      • traMADol (ULTRAM) 50 MG tablet        • vitamin B-12 (CYANOCOBALAMIN) 1000 MCG tablet Take 1,000 mcg by mouth Daily.      • vitamin D (ERGOCALCIFEROL) 1.25 MG (36123 UT) capsule capsule Take 50,000 Units by mouth 1 (One) Time Per Week.        Allergies:  Adhesive tape    Objective     Vital Signs  Temp:  [97.9 °F (36.6 °C)-98.7 °F (37.1 °C)] 98.4 °F (36.9 °C)  Heart Rate:  [68-76] 68  Resp:  [15-18] 18  BP: (112-153)/(60-93) 138/76    Physical Exam:  General:  This is a WD WN male in no acute distress  Vital signs stable, afebrile  HEENT exam:  WNL. Sclerae are anicteric.  EOMI  Neck:  supple, FROM.  No JVD.  Trachea midline  Lungs:  Respiratory effort normal. Auscultation: Clear, without wheezes, rhonchi, rales  Heart:  Regular rate and rhythm, without murmur, gallop, rub.  No pedal edema  Abdomen: Bowel sounds are present and normal.  Patient has some mild tenderness in the right upper quadrant over the area of the duodenum.  There is no guarding.  There is no palpable mass  Musculoskeletal:  muscle strength/tone is normal.    Psych:  alert, oriented x 3.  Mood and affect are appropriate  skin:  Warm with good turgor.  Without rash or lesion  extremities:  Examination of the extremities revealed no cyanosis, clubbing or edema.    Results Review:       Results from last 7 days   Lab Units 10/09/21  0128 10/08/21  2121 10/08/21  1732   CRP mg/dL 1.35*  --   --    LACTATE mmol/L  --  1.5  --    WBC 10*3/mm3 12.74*  --  11.16*   HEMOGLOBIN g/dL 13.7  --  13.2   HEMATOCRIT % 43.5  --  41.0   PLATELETS 10*3/mm3 249  --  254         Results from last 7 days   Lab Units 10/09/21  0128 10/08/21  1732   SODIUM mmol/L 133* 134*   POTASSIUM mmol/L 4.4 4.3   MAGNESIUM mg/dL 2.0 2.0   CHLORIDE mmol/L 98 99   CO2 mmol/L 22.4 24.8   BUN mg/dL 24* 23   CREATININE mg/dL 1.24 1.36*   EGFR IF NONAFRICN AM mL/min/1.73 55* 50*   CALCIUM mg/dL 8.8 9.2   GLUCOSE mg/dL 132* 173*   ALBUMIN g/dL 4.12 4.30   BILIRUBIN mg/dL 0.2 0.3   ALK PHOS U/L 77 81   AST (SGOT) U/L 29 32    ALT (SGPT) U/L 39 41   Estimated Creatinine Clearance: 49.9 mL/min (by C-G formula based on SCr of 1.24 mg/dL).  No results found for: AMMONIA      No results found for: BLOODCX  No results found for: URINECX  No results found for: WOUNDCX  No results found for: STOOLCX    Imaging:  Imaging Results (Last 24 Hours)     Procedure Component Value Units Date/Time    XR Abdomen Flat & Upright [[id number]] Collected: 10/09/21 1137     Updated: 10/09/21 1141    Narrative:      EXAM:    XR Abdomen, 2 Views     EXAM DATE:    10/9/2021 8:01 AM     CLINICAL HISTORY:    intra abdominal air; R11.2-Nausea with vomiting, unspecified;  R10.9-Unspecified abdominal pain     TECHNIQUE:    Frontal view of the abdomen/pelvis with upright view of the abdomen.     COMPARISON:    No relevant prior studies available.     FINDINGS:    Intraperitoneal space:  No radiographic evidence of free air is  identified.    Gastrointestinal tract:  Air-fluid levels of small and large bowel  representing ileus pattern commonly in the setting of enteritis.  No  bowel obstruction is noted.    Bones/joints:  Unremarkable.    Vasculature:  No visible portal venous gas is noted on x-ray.       Impression:      1.  No radiographic evidence of free air.  2.  Mild ileus pattern.  3.  No bowel obstruction.     This report was finalized on 10/9/2021 11:38 AM by Dr. Yunier Richardson MD.       XR Chest PA & Lateral [[id number]] Collected: 10/09/21 1137     Updated: 10/09/21 1139    Narrative:      EXAM:    XR Chest, 2 Views     EXAM DATE:    10/9/2021 10:29 AM     CLINICAL HISTORY:    intra-abdominal air; R11.2-Nausea with vomiting, unspecified;  R10.9-Unspecified abdominal pain     TECHNIQUE:    Frontal and lateral views of the chest.     COMPARISON:    10/08/2021     FINDINGS:    Lungs:  Linear atelectasis.    Pleural space:  Unremarkable.  No pneumothorax.    Heart:  CABG.    Mediastinum:  Unremarkable.    Bones/joints:  Old left rib fractures.       Impression:         Mild linear atelectasis. Otherwise stable chest with no acute  cardiopulmonary findings.     This report was finalized on 10/9/2021 11:37 AM by Dr. Yunier Richardson MD.       XR Chest AP [[id number]] Collected: 10/08/21 2017     Updated: 10/08/21 2020    Narrative:      EXAM:    XR Chest, 1 View     EXAM DATE:    10/8/2021 8:07 PM     CLINICAL HISTORY:    cough; R11.2-Nausea with vomiting, unspecified; R10.9-Unspecified  abdominal pain     TECHNIQUE:    Frontal view of the chest.     COMPARISON:    03/20/2015     FINDINGS:    Lungs:  Unremarkable.  No consolidation.    Pleural space:  Unremarkable.  No pneumothorax.    Heart:  Interval changes of CABG since the previous exam.  Mild  cardiomegaly is noted.    Mediastinum:  Unremarkable.    Bones/joints:  Old left rib fractures.       Impression:      1.  Cardiomegaly and changes of CABG noted.  2.  Otherwise no acute cardiopulmonary findings identified.     This report was finalized on 10/8/2021 8:18 PM by Dr. Yunier Richardson MD.       CT Abdomen Pelvis With Contrast [[id number]] Collected: 10/08/21 1909     Updated: 10/08/21 1920    Narrative:      EXAM:    CT Abdomen and Pelvis With Intravenous Contrast     EXAM DATE:    10/8/2021 6:17 PM     CLINICAL HISTORY:    abd pain, epigastric vs diffuse, nonspecific, diarrhea yesterday,  vomtiing     TECHNIQUE:    Axial computed tomography images of the abdomen and pelvis with  intravenous contrast.  Sagittal and coronal reformatted images were  created and reviewed.  This CT exam was performed using one or more of  the following dose reduction techniques:  automated exposure control,  adjustment of the mA and/or kV according to patient size, and/or use of  iterative reconstruction technique.     COMPARISON:    No relevant prior studies available.     FINDINGS:    Lung bases:  Bibasilar atelectasis.    Heart:  Cardiomegaly and prior CABG.      ABDOMEN:    Liver:  Portal venous gas and mesenteric venous gas is noted.     Gallbladder and bile ducts:  Unremarkable.  No calcified stones.  No  ductal dilation.    Pancreas:  Unremarkable.  No mass.  No ductal dilation.    Spleen:  Unremarkable.  No splenomegaly.    Adrenals:  Unremarkable.  No mass.    Kidneys and ureters:  Unremarkable.  No solid mass.  No  hydronephrosis.    Stomach and bowel:  Generalized ileus.  Sigmoid diverticulosis without  evidence of acute diverticulitis.  There are segments of equivocal wall  thickening in the mid small bowel within the lower abdomen but no  significant areas of pneumatosis are noted.  No obstruction.      PELVIS:    Appendix:  Normal appendix.    Bladder:  Urinary bladder wall thickening.    Reproductive:  Unremarkable as visualized.      ABDOMEN and PELVIS:    Intraperitoneal space:  No widespread pneumoperitoneum identified.  No  significant ascites.    Bones/joints:  Degenerative changes lumbar spine with multilevel  stenosis. No acute bony findings identified.  No dislocation.    Soft tissues:  Unremarkable.    Vasculature:  See above.    Lymph nodes:  Unremarkable.  No enlarged lymph nodes.       Impression:      1.  Presence of portal venous gas and mesenteric venous gas with  generalized ileus bowel gas pattern and segments of mild wall thickening  noted. Findings are indicative of bowel wall compromise but no large  widespread pneumoperitoneum or ascites is noted.  2.  No bowel obstruction is identified.  3.  Cardiomegaly.  4.  Bibasilar atelectasis.  5.  Gastric distention.  6.  Diverticulosis without diverticulitis.  7.  Other nonacute findings above.     Findings communicated to Dr. Rodríguez, referring ER doctor, at 7:15 PM on  10/08/2021.     This report was finalized on 10/8/2021 7:18 PM by Dr. Yunier Richardson MD.             On review of the CT abdomen pelvis I can see some gas in the liver presumed from venous structures.  I really cannot appreciate the gas within the mesenteric blood vessels or the portal vein.  There is no free  fluid or other secondary signs of intestinal ischemia.      Impression:  Patient Active Problem List   Diagnosis Code   • CA of prostate (HCC) C61   • ASCVD (arteriosclerotic cardiovascular disease) I25.10   • History of ischemic cardiomyopathy Z86.79   • HTN (hypertension) I10   • HLD (hyperlipidemia) E78.5   • HX OF Paroxysmal a-fib I48.0   • HX OF Chronic renal insufficiency N18.9   •       Impression: Portal venous gas without acute surgical abdomen in a patient with diarrhea and vomiting.    Plan:  Continue antibiotics  Start liquid diet  Stool sample for culture and PCR      Discussion:      Amber Mustafa MD  10/09/21  15:41 EDT    Time: Time spent:    Please note that portions of this note were completed with a voice recognition program.

## 2021-10-09 NOTE — ED NOTES
Called to give report, nobody available to receive report at this time. They will call me back.      Heather Armenta RN  10/08/21 2007

## 2021-10-09 NOTE — H&P
"     [facility] Medicine Services  HISTORY & PHYSICAL    Patient Identification:  Name:  Angelito Carter  Age:  86 y.o.  Sex:  male  :  1935  MRN:  [id number]   Visit Number:  [id number]  Admit Date: 10/8/2021   Primary Care Physician:  Heidy Tabares APRN     Subjective     Chief complaint:   Chief Complaint   Patient presents with   • Vomiting     History of presenting illness:   Patient is a 86 y.o. male with past medical history significant for ASCVD s/p CABG, essential hypertension, hyperlipidemia, chronic diastolic CHF, history of prostate cancer s/p radical prostatectomy and radiation,  that presented to the [facility] emergency department for evaluation of emesis.    The patient reports that 2 days ago he started having abdominal pain located in the epigastric region that was sharp in nature.  He reports that he had around 6 to 7 hours of persistent diarrhea and emesis.  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  He states that his stomach then began to feel \"tight\" with worsening epigastric pain.  He reports that he has been experiencing diaphoresis from the discomfort but denies any fever, melena, hematochezia, or hematemesis.  He does report that his abdomen feels significantly distended.  He denies any sick contacts or ever having anything like this before.  He does not believe he has ever had an EGD but reports having a colonoscopy when he was 21 years old.  He is still passing gas but has not had any further episodes of diarrhea or emesis since arriving to the emergency department.  He denies any excessive NSAID use or known history of gastric ulcers.  He admits to occasionally getting choked on foods when he eats too quickly.     Upon arrival to the ED, vitals were temperature 98.7 F, pulse 70, RR 18, /67, SpO2 93%. CMP with glucose 173, sodium " 134, creatinine 1.36, GFR 50. CBC with WBC 11.16, absolute neutrophils 8.35. CXR with cardiomegaly and changes of CABG. CT of abdomen/pelvis with contrast shows presence of portal venous gas and mesenteric venous gas with generalized ileus bowel gas pattern and segments of mild wall thickening noted. Findings are indicative of bowel wall compromise but no large widespread pneumoperitoneum or ascites is noted; now bowel obstruction, cardiomegaly, bibasilar atelectasis, gastric distension, diverticulosis without diverticulitis. EKG shows NSR with HR 69, QTc 430 ms.     In the ED the patient received, IV dilaudid 1 mg, IV morphine 4 mg, IV NS 1L bolus, IV zofran 4 mg.     Patient has been admitted to the progressive care unit for further evaluation and treatment   ---------------------------------------------------------------------------------------------------------------------   Review of Systems   Constitutional: Positive for diaphoresis (Associated with pain). Negative for chills and fever.   HENT: Negative for congestion and sore throat.    Eyes: Negative for discharge and visual disturbance.   Respiratory: Positive for choking (Occasionally gets choked on food). Negative for cough, shortness of breath and wheezing.    Cardiovascular: Negative for chest pain, palpitations and leg swelling.   Gastrointestinal: Positive for abdominal distention, abdominal pain (Sharp epigastric), diarrhea, nausea and vomiting. Negative for blood in stool and constipation.   Genitourinary: Negative for dysuria and frequency.   Musculoskeletal: Negative for gait problem and myalgias.   Skin: Negative for rash and wound.   Neurological: Negative for dizziness, syncope and light-headedness.   Psychiatric/Behavioral: Negative for confusion. The patient is not nervous/anxious.       ---------------------------------------------------------------------------------------------------------------------   Past Medical History:   Diagnosis  Date   • Acute myocardial infarction (HCC)    • Adenocarcinoma (HCC)     prostate gland   • Arthritis    • Heart attack (HCC)    • Hx of radiation therapy    • Hypertension    • Prostate cancer (HCC)      Past Surgical History:   Procedure Laterality Date   • BIOPSY PROSTATE NEEDLE / PUNCH / INCISIONAL     • CORONARY ARTERY BYPASS GRAFT     • OTHER SURGICAL HISTORY      Coronary Artery Triple Arterial Byupass Graft   • PROSTATE SURGERY     • PROSTATECTOMY      Robotic-assisted   • SKIN CANCER EXCISION Left     removed from left ear   • VASECTOMY       Family History   Problem Relation Age of Onset   • Kidney disease Father    • Heart disease Father    • Heart attack Father    • Kidney disease Mother    • Heart disease Mother      Social History     Socioeconomic History   • Marital status:      Spouse name: Not on file   • Number of children: Not on file   • Years of education: Not on file   • Highest education level: Not on file   Tobacco Use   • Smoking status: Never Smoker   • Smokeless tobacco: Never Used   Vaping Use   • Vaping Use: Never used   Substance and Sexual Activity   • Alcohol use: Yes     Comment: once in awhile   • Drug use: Never   • Sexual activity: Defer     ---------------------------------------------------------------------------------------------------------------------   Allergies:  Adhesive tape  ---------------------------------------------------------------------------------------------------------------------   Medications below are reported home medications pulling from within the system; at this time, these medications have not been reconciled unless otherwise specified and are in the verification process for further verifcation as current home medications.    Prior to Admission Medications     Prescriptions Last Dose Informant Patient Reported? Taking?    amLODIPine (NORVASC) 10 MG tablet   No No    Take 1 tablet by mouth Daily.    aspirin 81 MG tablet   No No    Take 1 tablet  by mouth Daily.    Glucosamine-Chondroitin (OSTEO BI-FLEX REGULAR STRENGTH PO)   Yes No    Take  by mouth.    HYDROcodone-acetaminophen (NORCO) 7.5-325 MG per tablet   Yes No    Take 1 tablet by mouth Every 6 (Six) Hours As Needed for Moderate Pain .    lisinopril (PRINIVIL,ZESTRIL) 10 MG tablet   No No    Take 1 tablet by mouth Daily.    magnesium oxide (MAG-OX) 400 MG tablet  Spouse/Significant Other Yes No    Take 400 mg by mouth Daily.    metoprolol succinate XL (TOPROL-XL) 25 MG 24 hr tablet   No No    TAKE ONE TABLET BY MOUTH DAILY    multivitamin with minerals tablet tablet  Spouse/Significant Other Yes No    Take 1 tablet by mouth Daily.    Omega-3 Fatty Acids (FISH OIL) 1000 MG capsule capsule   Yes No    Take 1,000 mg by mouth Daily With Breakfast.    rosuvastatin (CRESTOR) 5 MG tablet   No No    Take 1 tablet by mouth Daily.    tamsulosin (FLOMAX) 0.4 MG capsule 24 hr capsule   Yes No    Take 1 capsule by mouth Every Night.    traMADol (ULTRAM) 50 MG tablet   Yes No    vitamin B-12 (CYANOCOBALAMIN) 1000 MCG tablet   Yes No    Take 1,000 mcg by mouth Daily.    vitamin D (ERGOCALCIFEROL) 1.25 MG (52422 UT) capsule capsule  Spouse/Significant Other Yes No    Take 50,000 Units by mouth 1 (One) Time Per Week.        ---------------------------------------------------------------------------------------------------------------------    Objective     Hospital Scheduled Meds:  cefTRIAXone, 1 g, Intravenous, Daily  heparin (porcine), 5,000 Units, Subcutaneous, Q12H  metroNIDAZOLE, 500 mg, Intravenous, Q8H  sodium chloride, 3 mL, Intravenous, Q12H           Current listed hospital scheduled medications may not yet reflect those currently placed in orders that are signed and held, awaiting patient's arrival to floor/unit.    ---------------------------------------------------------------------------------------------------------------------   Vital Signs:  Temp:  [98.7 °F (37.1 °C)] 98.7 °F (37.1 °C)  Heart Rate:   [68-70] 68  Resp:  [18] 18  BP: (112-153)/(60-70) 120/64  Mean Arterial Pressure (Non-Invasive) for the past 24 hrs (Last 3 readings):   Noninvasive MAP (mmHg)   10/08/21 1953 78   10/08/21 1937 78   10/08/21 1923 105     SpO2 Percentage    10/08/21 1908 10/08/21 1923 10/08/21 1937   SpO2: 92% 92% 90%     SpO2:  [90 %-97 %] 90 %  on   ;   Device (Oxygen Therapy): room air    Body mass index is 30.13 kg/m².  Wt Readings from Last 3 Encounters:   10/08/21 95.3 kg (210 lb)   08/19/21 95.3 kg (210 lb)   07/22/21 95.3 kg (210 lb 1.6 oz)     ---------------------------------------------------------------------------------------------------------------------   Physical Exam:  Physical Exam  Constitutional:       Appearance: Normal appearance. He is well-developed and overweight.      Interventions: Nasal cannula in place.      Comments: 2 L O2 NC   HENT:      Head: Normocephalic and atraumatic.   Eyes:      General: Lids are normal.         Right eye: No discharge.         Left eye: No discharge.   Cardiovascular:      Rate and Rhythm: Normal rate and regular rhythm.      Pulses: Normal pulses. No decreased pulses.           Dorsalis pedis pulses are 2+ on the right side and 2+ on the left side.        Posterior tibial pulses are 2+ on the right side and 2+ on the left side.      Heart sounds: Normal heart sounds. No murmur heard.   No friction rub. No gallop.    Pulmonary:      Effort: Pulmonary effort is normal. No tachypnea or bradypnea.      Breath sounds: Normal breath sounds. No decreased breath sounds, wheezing, rhonchi or rales.   Abdominal:      General: Bowel sounds are increased. There is distension.      Palpations: Abdomen is rigid.      Tenderness: There is abdominal tenderness (Mild tenderness with deep palpation). There is no guarding.   Musculoskeletal:      Right lower leg: No edema.      Left lower leg: No edema.   Skin:     Findings: No abrasion, ecchymosis or erythema.   Neurological:      General: No  focal deficit present.      Mental Status: He is alert, oriented to person, place, and time and easily aroused. Mental status is at baseline.   Psychiatric:         Speech: Speech normal.         Behavior: Behavior is cooperative.         Cognition and Memory: Cognition normal.       ---------------------------------------------------------------------------------------------------------------------  EKG:    Pending cardiology read.  Per my evaluation, normal sinus rhythm, heart rate 69, QTc 430 Ms.    Telemetry:    Normal sinus rhythm, heart rate 74 bpm, SPO2 95% on room air    I have personally reviewed the EKG/Telemetry strip  ---------------------------------------------------------------------------------------------------------------------             Results from last 7 days   Lab Units 10/08/21  1732   WBC 10*3/mm3 11.16*   HEMOGLOBIN g/dL 13.2   HEMATOCRIT % 41.0   MCV fL 95.3   MCHC g/dL 32.2   PLATELETS 10*3/mm3 254     Results from last 7 days   Lab Units 10/08/21  1732   SODIUM mmol/L 134*   POTASSIUM mmol/L 4.3   MAGNESIUM mg/dL 2.0   CHLORIDE mmol/L 99   CO2 mmol/L 24.8   BUN mg/dL 23   CREATININE mg/dL 1.36*   EGFR IF NONAFRICN AM mL/min/1.73 50*   CALCIUM mg/dL 9.2   GLUCOSE mg/dL 173*   ALBUMIN g/dL 4.30   BILIRUBIN mg/dL 0.3   ALK PHOS U/L 81   AST (SGOT) U/L 32   ALT (SGPT) U/L 41   Estimated Creatinine Clearance: 46 mL/min (A) (by C-G formula based on SCr of 1.36 mg/dL (H)).  No results found for: AMMONIA    No results found for: HGBA1C, POCGLU  Lab Results   Component Value Date    HGBA1C 5.7 03/25/2015     Lab Results   Component Value Date    TSH 2.846 04/13/2015    FREET4 1.15 04/13/2015     Pain Management Panel     Pain Management Panel Latest Ref Rng & Units 3/25/2015    CREATININE UR mg/dL 202.6        I have personally reviewed the above laboratory results.   ---------------------------------------------------------------------------------------------------------------------  Imaging  Results (Last 7 Days)     Procedure Component Value Units Date/Time    XR Chest AP [[id number]] Collected: 10/08/21 2017     Updated: 10/08/21 2020    Narrative:      EXAM:    XR Chest, 1 View     EXAM DATE:    10/8/2021 8:07 PM     CLINICAL HISTORY:    cough; R11.2-Nausea with vomiting, unspecified; R10.9-Unspecified  abdominal pain     TECHNIQUE:    Frontal view of the chest.     COMPARISON:    03/20/2015     FINDINGS:    Lungs:  Unremarkable.  No consolidation.    Pleural space:  Unremarkable.  No pneumothorax.    Heart:  Interval changes of CABG since the previous exam.  Mild  cardiomegaly is noted.    Mediastinum:  Unremarkable.    Bones/joints:  Old left rib fractures.       Impression:      1.  Cardiomegaly and changes of CABG noted.  2.  Otherwise no acute cardiopulmonary findings identified.     This report was finalized on 10/8/2021 8:18 PM by Dr. Yunier Richardson MD.       CT Abdomen Pelvis With Contrast [[id number]] Collected: 10/08/21 1909     Updated: 10/08/21 1920    Narrative:      EXAM:    CT Abdomen and Pelvis With Intravenous Contrast     EXAM DATE:    10/8/2021 6:17 PM     CLINICAL HISTORY:    abd pain, epigastric vs diffuse, nonspecific, diarrhea yesterday,  vomtiing     TECHNIQUE:    Axial computed tomography images of the abdomen and pelvis with  intravenous contrast.  Sagittal and coronal reformatted images were  created and reviewed.  This CT exam was performed using one or more of  the following dose reduction techniques:  automated exposure control,  adjustment of the mA and/or kV according to patient size, and/or use of  iterative reconstruction technique.     COMPARISON:    No relevant prior studies available.     FINDINGS:    Lung bases:  Bibasilar atelectasis.    Heart:  Cardiomegaly and prior CABG.      ABDOMEN:    Liver:  Portal venous gas and mesenteric venous gas is noted.    Gallbladder and bile ducts:  Unremarkable.  No calcified stones.  No  ductal dilation.    Pancreas:   Unremarkable.  No mass.  No ductal dilation.    Spleen:  Unremarkable.  No splenomegaly.    Adrenals:  Unremarkable.  No mass.    Kidneys and ureters:  Unremarkable.  No solid mass.  No  hydronephrosis.    Stomach and bowel:  Generalized ileus.  Sigmoid diverticulosis without  evidence of acute diverticulitis.  There are segments of equivocal wall  thickening in the mid small bowel within the lower abdomen but no  significant areas of pneumatosis are noted.  No obstruction.      PELVIS:    Appendix:  Normal appendix.    Bladder:  Urinary bladder wall thickening.    Reproductive:  Unremarkable as visualized.      ABDOMEN and PELVIS:    Intraperitoneal space:  No widespread pneumoperitoneum identified.  No  significant ascites.    Bones/joints:  Degenerative changes lumbar spine with multilevel  stenosis. No acute bony findings identified.  No dislocation.    Soft tissues:  Unremarkable.    Vasculature:  See above.    Lymph nodes:  Unremarkable.  No enlarged lymph nodes.       Impression:      1.  Presence of portal venous gas and mesenteric venous gas with  generalized ileus bowel gas pattern and segments of mild wall thickening  noted. Findings are indicative of bowel wall compromise but no large  widespread pneumoperitoneum or ascites is noted.  2.  No bowel obstruction is identified.  3.  Cardiomegaly.  4.  Bibasilar atelectasis.  5.  Gastric distention.  6.  Diverticulosis without diverticulitis.  7.  Other nonacute findings above.     Findings communicated to Dr. Rodríguez, referring ER doctor, at 7:15 PM on  10/08/2021.     This report was finalized on 10/8/2021 7:18 PM by Dr. Yunier Richardson MD.           I have personally reviewed the above radiology results.     Last Echocardiogram:  Results for orders placed during the hospital encounter of 06/29/21    Adult Transthoracic Echo Complete W/ Cont if Necessary Per Protocol    Interpretation Summary  · Left ventricular ejection fraction appears to be 51 - 55%. Left  ventricular systolic function is normal.  · Left ventricular diastolic function is consistent with (grade I) impaired relaxation.  · No significant functional valvular abnormalities noted  · There is no evidence of pericardial effusion    ---------------------------------------------------------------------------------------------------------------------    Assessment & Plan      Active Hospital Problems    Diagnosis  POA   • **Intra-abdominal free air of unknown etiology [K66.8]  Yes     #Intra-abdominal free air without pneumoperitoneum or ascites POA 2/2 to unknown etiology at this time   #Generalized ileus  #Diverticulosis without diverticulitis   -CT of the abdomen/pelvis shows presence of portal venous gas and mesenteric venous gas with generalized ileus bowel gas pattern and segments of mild wall thickening; findings are indicated of bowel compromise but no large widespread pneumoperitoneum or ascites noted and no bowel obstruction identified  -General surgery, Dr. Mustafa, was contacted in the emergency department and recommended antibiotics and agreed to see in consult in a.m.  -General surgery has been consulted, further input/assistance is much appreciated  -We will start the patient on IV cefepime and Flagyl   -Lactobacillus for GI protection  -Currently does not meet sepsis criteria; vitals are unremarkable, WBC slightly elevated at 11.16; lactate 1.5   -Discussed with attending, Dr. Hale, we will obtain an upright chest x-ray and abdominal x-ray series in a.m. to evaluate for air in the diaphragm  -IV Zofran as needed for N/V  -Continue to monitor closely     #Urinary bladder wall thickening   -Denies any urinary symptoms   -UA ordered      #CAD s/p CABG  #Cardiomegaly   -Currently chest pain free  -EKG without acute ischemic changes  -Review home medications once available     #Grade I diastolic dysfunction   -Appears euvolemic on exam  -Echo from 6/29/2021 reviewed, EF 51 to 55%  -Monitor for signs  of volume overload Daily weights, strict I's and O's    #CKD stage IIIA  -Baseline creatinine appears to be 1.3-1.4; creatinine on admission 1.36  -Repeat AM CMP and monitor renal function closely     #Bibasilar atelectasis  -No evidence of pneumonia  -Incentive spirometry given, encourage use    #History of prostate cancer s/p radical prostatectomy and subsequent salvage radiation therapy   -Recommend continued outpatient follow-up with urology    #Essential hypertension   -Reviewed home medications once available  -Plan to continue antihypertensive agents with appropriate holding parameters    #History of paroxysmal atrial fibrillation   -Currently in normal sinus rhythm  -Based off of current home medication list does not appear to be chronically anticoagulated  -Currently awaiting home meds to be reconciled per pharmacy; review once available  -Monitor closely on telemetry    #Obesity  -BMI 30.13 kg/m²  -Complicates all aspects of patient care      F/E/N: No IV fluids. Replace electrolytes as necessary. NPO   ---------------------------------------------------  DVT Prophylaxis: Subcutaneous heparin   GI Prophylaxis: IV Pepcid   Activity: Up with assistance, fall precautions     The patient is considered to be a high risk patient due to: intraabdominal free air of unknown etiology POA     INPATIENT status due to the need for care which can only be reasonably provided in an hospital setting such as aggressive/expedited ancillary services and/or consultation services, the necessity for IV medications, close physician monitoring and/or the possible need for procedures.  In such, I feel patient’s risk for adverse outcomes and need for care warrant INPATIENT evaluation and predict the patient’s care encounter to likely last beyond 2 midnights.    Code Status: FULL CODE     Disposition/Discharge planning: Plan for home at discharge. Pending clinical course.     I have discussed the patient's assessment and plan with  the patient, nursing staff, and attending physician       Anamika Angela PA-C  Hospitalist Service -- [facility]       10/08/21  21:18 EDT    Attending Physician: Ashleigh Hale MD

## 2021-10-10 ENCOUNTER — APPOINTMENT (OUTPATIENT)
Dept: CT IMAGING | Facility: HOSPITAL | Age: 86
End: 2021-10-10

## 2021-10-10 LAB
027 TOXIN: NORMAL
ADV 40+41 DNA STL QL NAA+NON-PROBE: DETECTED
ANION GAP SERPL CALCULATED.3IONS-SCNC: 8 MMOL/L (ref 5–15)
ASTRO TYP 1-8 RNA STL QL NAA+NON-PROBE: NOT DETECTED
BUN SERPL-MCNC: 22 MG/DL (ref 8–23)
BUN/CREAT SERPL: 15.6 (ref 7–25)
C CAYETANENSIS DNA STL QL NAA+NON-PROBE: NOT DETECTED
C COLI+JEJ+UPSA DNA STL QL NAA+NON-PROBE: NOT DETECTED
C DIFF TOX GENS STL QL NAA+PROBE: NEGATIVE
CALCIUM SPEC-SCNC: 8.2 MG/DL (ref 8.6–10.5)
CHLORIDE SERPL-SCNC: 107 MMOL/L (ref 98–107)
CO2 SERPL-SCNC: 23 MMOL/L (ref 22–29)
CREAT SERPL-MCNC: 1.41 MG/DL (ref 0.76–1.27)
CRYPTOSP DNA STL QL NAA+NON-PROBE: NOT DETECTED
DEPRECATED RDW RBC AUTO: 47.4 FL (ref 37–54)
E HISTOLYT DNA STL QL NAA+NON-PROBE: NOT DETECTED
EAEC PAA PLAS AGGR+AATA ST NAA+NON-PRB: NOT DETECTED
EC STX1+STX2 GENES STL QL NAA+NON-PROBE: NOT DETECTED
EPEC EAE GENE STL QL NAA+NON-PROBE: NOT DETECTED
ERYTHROCYTE [DISTWIDTH] IN BLOOD BY AUTOMATED COUNT: 13.2 % (ref 12.3–15.4)
ETEC LTA+ST1A+ST1B TOX ST NAA+NON-PROBE: NOT DETECTED
G LAMBLIA DNA STL QL NAA+NON-PROBE: NOT DETECTED
GFR SERPL CREATININE-BSD FRML MDRD: 48 ML/MIN/1.73
GLUCOSE SERPL-MCNC: 87 MG/DL (ref 65–99)
HCT VFR BLD AUTO: 36.9 % (ref 37.5–51)
HGB BLD-MCNC: 11.7 G/DL (ref 13–17.7)
MCH RBC QN AUTO: 30.7 PG (ref 26.6–33)
MCHC RBC AUTO-ENTMCNC: 31.7 G/DL (ref 31.5–35.7)
MCV RBC AUTO: 96.9 FL (ref 79–97)
NOROVIRUS GI+II RNA STL QL NAA+NON-PROBE: NOT DETECTED
P SHIGELLOIDES DNA STL QL NAA+NON-PROBE: NOT DETECTED
PLATELET # BLD AUTO: 204 10*3/MM3 (ref 140–450)
PMV BLD AUTO: 9 FL (ref 6–12)
POTASSIUM SERPL-SCNC: 4.5 MMOL/L (ref 3.5–5.2)
QT INTERVAL: 402 MS
QTC INTERVAL: 430 MS
RBC # BLD AUTO: 3.81 10*6/MM3 (ref 4.14–5.8)
RVA RNA STL QL NAA+NON-PROBE: NOT DETECTED
S ENT+BONG DNA STL QL NAA+NON-PROBE: NOT DETECTED
SAPO I+II+IV+V RNA STL QL NAA+NON-PROBE: DETECTED
SHIGELLA SP+EIEC IPAH ST NAA+NON-PROBE: NOT DETECTED
SODIUM SERPL-SCNC: 138 MMOL/L (ref 136–145)
V CHOL+PARA+VUL DNA STL QL NAA+NON-PROBE: NOT DETECTED
V CHOLERAE DNA STL QL NAA+NON-PROBE: NOT DETECTED
WBC # BLD AUTO: 7.68 10*3/MM3 (ref 3.4–10.8)
Y ENTEROCOL DNA STL QL NAA+NON-PROBE: NOT DETECTED

## 2021-10-10 PROCEDURE — 74176 CT ABD & PELVIS W/O CONTRAST: CPT | Performed by: RADIOLOGY

## 2021-10-10 PROCEDURE — 80048 BASIC METABOLIC PNL TOTAL CA: CPT | Performed by: STUDENT IN AN ORGANIZED HEALTH CARE EDUCATION/TRAINING PROGRAM

## 2021-10-10 PROCEDURE — 99232 SBSQ HOSP IP/OBS MODERATE 35: CPT | Performed by: SURGERY

## 2021-10-10 PROCEDURE — 25010000002 HEPARIN (PORCINE) PER 1000 UNITS: Performed by: INTERNAL MEDICINE

## 2021-10-10 PROCEDURE — 87493 C DIFF AMPLIFIED PROBE: CPT | Performed by: SURGERY

## 2021-10-10 PROCEDURE — 99232 SBSQ HOSP IP/OBS MODERATE 35: CPT | Performed by: STUDENT IN AN ORGANIZED HEALTH CARE EDUCATION/TRAINING PROGRAM

## 2021-10-10 PROCEDURE — 25010000002 CEFEPIME PER 500 MG: Performed by: PHYSICIAN ASSISTANT

## 2021-10-10 PROCEDURE — 0097U HC BIOFIRE FILMARRAY GI PANEL: CPT | Performed by: STUDENT IN AN ORGANIZED HEALTH CARE EDUCATION/TRAINING PROGRAM

## 2021-10-10 PROCEDURE — 74176 CT ABD & PELVIS W/O CONTRAST: CPT

## 2021-10-10 PROCEDURE — 85027 COMPLETE CBC AUTOMATED: CPT | Performed by: STUDENT IN AN ORGANIZED HEALTH CARE EDUCATION/TRAINING PROGRAM

## 2021-10-10 RX ORDER — ROSUVASTATIN CALCIUM 10 MG/1
5 TABLET, COATED ORAL NIGHTLY
Status: CANCELLED | OUTPATIENT
Start: 2021-10-10

## 2021-10-10 RX ORDER — BETAMETHASONE DIPROPIONATE 0.5 MG/G
CREAM TOPICAL 4 TIMES DAILY
Status: CANCELLED | OUTPATIENT
Start: 2021-10-10

## 2021-10-10 RX ORDER — ROSUVASTATIN CALCIUM 10 MG/1
5 TABLET, COATED ORAL NIGHTLY
Status: DISCONTINUED | OUTPATIENT
Start: 2021-10-11 | End: 2021-10-11 | Stop reason: HOSPADM

## 2021-10-10 RX ORDER — ROSUVASTATIN CALCIUM 5 MG/1
5 TABLET, COATED ORAL NIGHTLY
COMMUNITY

## 2021-10-10 RX ORDER — CHOLECALCIFEROL (VITAMIN D3) 125 MCG
2000 CAPSULE ORAL DAILY
COMMUNITY

## 2021-10-10 RX ORDER — CYCLOBENZAPRINE HCL 10 MG
5 TABLET ORAL NIGHTLY PRN
Status: DISCONTINUED | OUTPATIENT
Start: 2021-10-10 | End: 2021-10-11 | Stop reason: HOSPADM

## 2021-10-10 RX ORDER — CYCLOBENZAPRINE HCL 10 MG
5 TABLET ORAL NIGHTLY PRN
COMMUNITY

## 2021-10-10 RX ORDER — SODIUM CHLORIDE 0.9 % (FLUSH) 0.9 %
10 SYRINGE (ML) INJECTION AS NEEDED
Status: DISCONTINUED | OUTPATIENT
Start: 2021-10-10 | End: 2021-10-11 | Stop reason: HOSPADM

## 2021-10-10 RX ORDER — TRIAMCINOLONE ACETONIDE 5 MG/G
1 CREAM TOPICAL 4 TIMES DAILY
Status: ON HOLD | COMMUNITY
End: 2023-02-14

## 2021-10-10 RX ORDER — SODIUM CHLORIDE 0.9 % (FLUSH) 0.9 %
10 SYRINGE (ML) INJECTION EVERY 12 HOURS SCHEDULED
Status: DISCONTINUED | OUTPATIENT
Start: 2021-10-10 | End: 2021-10-11 | Stop reason: HOSPADM

## 2021-10-10 RX ADMIN — METRONIDAZOLE 500 MG: 500 INJECTION, SOLUTION INTRAVENOUS at 21:44

## 2021-10-10 RX ADMIN — HEPARIN SODIUM 5000 UNITS: 5000 INJECTION INTRAVENOUS; SUBCUTANEOUS at 21:43

## 2021-10-10 RX ADMIN — METOPROLOL SUCCINATE 25 MG: 25 TABLET, EXTENDED RELEASE ORAL at 08:58

## 2021-10-10 RX ADMIN — Medication 1 CAPSULE: at 08:58

## 2021-10-10 RX ADMIN — ROSUVASTATIN CALCIUM 5 MG: 10 TABLET, FILM COATED ORAL at 08:58

## 2021-10-10 RX ADMIN — FAMOTIDINE 20 MG: 10 INJECTION INTRAVENOUS at 08:58

## 2021-10-10 RX ADMIN — SODIUM CHLORIDE, PRESERVATIVE FREE 3 ML: 5 INJECTION INTRAVENOUS at 22:50

## 2021-10-10 RX ADMIN — SODIUM CHLORIDE, PRESERVATIVE FREE 10 ML: 5 INJECTION INTRAVENOUS at 22:49

## 2021-10-10 RX ADMIN — METRONIDAZOLE 500 MG: 500 INJECTION, SOLUTION INTRAVENOUS at 11:47

## 2021-10-10 RX ADMIN — TAMSULOSIN HYDROCHLORIDE 0.4 MG: 0.4 CAPSULE ORAL at 00:29

## 2021-10-10 RX ADMIN — TRAMADOL HYDROCHLORIDE 50 MG: 50 TABLET, FILM COATED ORAL at 21:44

## 2021-10-10 RX ADMIN — METRONIDAZOLE 500 MG: 500 INJECTION, SOLUTION INTRAVENOUS at 03:20

## 2021-10-10 RX ADMIN — CEFEPIME HYDROCHLORIDE 2 G: 2 INJECTION, POWDER, FOR SOLUTION INTRAVENOUS at 08:58

## 2021-10-10 RX ADMIN — SODIUM CHLORIDE, PRESERVATIVE FREE 3 ML: 5 INJECTION INTRAVENOUS at 09:00

## 2021-10-10 RX ADMIN — HEPARIN SODIUM 5000 UNITS: 5000 INJECTION INTRAVENOUS; SUBCUTANEOUS at 08:58

## 2021-10-10 RX ADMIN — TAMSULOSIN HYDROCHLORIDE 0.4 MG: 0.4 CAPSULE ORAL at 21:43

## 2021-10-10 RX ADMIN — CEFEPIME HYDROCHLORIDE 2 G: 2 INJECTION, POWDER, FOR SOLUTION INTRAVENOUS at 22:43

## 2021-10-10 NOTE — NURSING NOTE
A&Ox4.  Pt states he is feeling much better.  He is very calm and pleasant.  Placed pt on 2L NC sat at 88%. Denies any pain at the moment.

## 2021-10-10 NOTE — PROGRESS NOTES
[facility] HOSPITALIST PROGRESS NOTE     Patient Identification:  Name:  Angelito Carter  Age:  86 y.o.  Sex:  male  :  1935  MRN:  [id number]  Visit Number:  [id number]  ROOM: [place] [address]     Primary Care Provider:  Heidy Tabares APRN    Length of stay in inpatient status:  1    Subjective     Chief Compliant:    Chief Complaint   Patient presents with   • Vomiting       History of Presenting Illness: Patient seen and evaluated in follow-up for generalized ileus with intra-abdominal portal venous gas without acute surgical abdomen.  Patient feeling well this morning with improved abdominal pain and still passing flatus.  General surgery to see and evaluate later today.    Objective     Current Hospital Meds:cefepime, 2 g, Intravenous, Q12H  famotidine, 20 mg, Intravenous, Daily  heparin (porcine), 5,000 Units, Subcutaneous, Q12H  lactobacillus acidophilus, 1 capsule, Oral, Daily  metroNIDAZOLE, 500 mg, Intravenous, Q8H  sodium chloride, 3 mL, Intravenous, Q12H  traMADol, 50 mg, Oral, BID         Current Antimicrobial Therapy:  Anti-Infectives (From admission, onward)    Ordered     Dose/Rate Route Frequency Start Stop    10/09/21 0031  cefepime 2 gm IVPB in 100 ml NS (VTB)        Ordering Provider: Anamika Angela PA-C    2 g  over 4 Hours Intravenous Every 12 Hours 10/09/21 0930 10/14/21 0929    10/09/21 0031  cefepime 2 gm IVPB in 100 ml NS (VTB)        Ordering Provider: Anamika Angela PA-C    2 g  over 30 Minutes Intravenous Once 10/09/21 0130 10/09/21 0539    10/08/21 1930  metroNIDAZOLE (FLAGYL) 500 mg/100mL IVPB        Ordering Provider: Ashleigh Hale MD    500 mg  over 30 Minutes Intravenous Every 8 Hours 10/08/21 1931 10/13/21 1931        Current Diuretic Therapy:  Diuretics (From admission, onward)    None        ----------------------------------------------------------------------------------------------------------------------  Vital Signs:  Temp:  [97.9 °F (36.6  °C)-98.7 °F (37.1 °C)] 98.7 °F (37.1 °C)  Heart Rate:  [63-76] 63  Resp:  [15-18] 18  BP: (120-153)/(67-93) 120/67  SpO2:  [92 %-96 %] 94 %  on  Flow (L/min):  [2] 2;   Device (Oxygen Therapy): room air  Body mass index is 29.39 kg/m².    Wt Readings from Last 3 Encounters:   10/09/21 92.9 kg (204 lb 12.8 oz)   08/19/21 95.3 kg (210 lb)   07/22/21 95.3 kg (210 lb 1.6 oz)     Intake & Output (last 3 days)       10/07 0701  10/08 0700 10/08 0701  10/09 0700 10/09 0701  10/10 0700    P.O.   120    I.V. (mL/kg)  301.4 (3.2) 176.9 (1.9)    IV Piggyback  1000     Total Intake(mL/kg)  1301.4 (14) 296.9 (3.2)    Net  +1301.4 +296.9           Urine Unmeasured Occurrence   2 x        Diet Clear Liquid  ----------------------------------------------------------------------------------------------------------------------  Physical exam:  Constitutional:  Well-developed and well-nourished.  No acute distress.      HENT:  Head:  Normocephalic and atraumatic.  Mouth:  Moist mucous membranes.    Eyes:  Conjunctivae and EOM are normal. No scleral icterus.    Neck:  Neck supple.  No JVD present.    Cardiovascular:  Normal rate, regular rhythm and normal heart sounds with no murmur.  Pulmonary/Chest:  No respiratory distress, no wheezes, no crackles, with normal breath sounds and good air movement.  Abdominal:  Soft.  Bowel sounds are hyperactive.  There is mild distention and mild tenderness with palpation most significantly in the right lower quadrant.    Musculoskeletal:  No edema, no tenderness, and no deformity.  No red or swollen joints anywhere.  Functional ROM intact.   Neurological:  Alert and oriented to person, place, and time.  No cranial nerve deficit.  No tongue deviation.  No facial droop.  No slurred speech. Intact Sensation throughout  Skin:  Skin is warm and dry. No rash or lesion noted. No pallor.   Peripheral vascular:  Pulses in all 4 extremities with no clubbing, no cyanosis, no edema.  Psychiatric: Appropriate  mood and affect, pleasant.   ----------------------------------------------------------------------------------------------------------------------  Tele:    ----------------------------------------------------------------------------------------------------------------------  Results from last 7 days   Lab Units 10/09/21  0128 10/08/21  2121 10/08/21  1732   CRP mg/dL 1.35*  --   --    LACTATE mmol/L  --  1.5  --    WBC 10*3/mm3 12.74*  --  11.16*   HEMOGLOBIN g/dL 13.7  --  13.2   HEMATOCRIT % 43.5  --  41.0   MCV fL 96.5  --  95.3   MCHC g/dL 31.5  --  32.2   PLATELETS 10*3/mm3 249  --  254         Results from last 7 days   Lab Units 10/09/21  0128 10/08/21  1732   SODIUM mmol/L 133* 134*   POTASSIUM mmol/L 4.4 4.3   MAGNESIUM mg/dL 2.0 2.0   CHLORIDE mmol/L 98 99   CO2 mmol/L 22.4 24.8   BUN mg/dL 24* 23   CREATININE mg/dL 1.24 1.36*   EGFR IF NONAFRICN AM mL/min/1.73 55* 50*   CALCIUM mg/dL 8.8 9.2   GLUCOSE mg/dL 132* 173*   ALBUMIN g/dL 4.12 4.30   BILIRUBIN mg/dL 0.2 0.3   ALK PHOS U/L 77 81   AST (SGOT) U/L 29 32   ALT (SGPT) U/L 39 41   Estimated Creatinine Clearance: 49.9 mL/min (by C-G formula based on SCr of 1.24 mg/dL).  No results found for: AMMONIA              Hemoglobin A1C   Date/Time Value Ref Range Status   10/09/2021 0128 5.70 (H) 4.80 - 5.60 % Final     Glucose   Date/Time Value Ref Range Status   10/08/2021 2303 124 70 - 130 mg/dL Final     Comment:     Meter: [id number] : [id number] ALEXCARO VILLARREAL     Lab Results   Component Value Date    TSH 1.170 10/09/2021    FREET4 1.15 04/13/2015     No results found for: PREGTESTUR, PREGSERUM, HCG, HCGQUANT  Pain Management Panel     Pain Management Panel Latest Ref Rng & Units 3/25/2015    CREATININE UR mg/dL 202.6        Brief Urine Lab Results  (Last result in the past 365 days)      Color   Clarity   Blood   Leuk Est   Nitrite   Protein   CREAT   Urine HCG        10/09/21 0353 Yellow   Clear   Negative   Negative   Negative   Negative                No results found for: BLOODCX  No results found for: URINECX  No results found for: WOUNDCX  No results found for: STOOLCX  No results found for: RESPCX  No results found for: AFBCX  Results from last 7 days   Lab Units 10/09/21  0128 10/08/21  2121   LACTATE mmol/L  --  1.5   CRP mg/dL 1.35*  --        I have personally looked at the labs and they are summarized above.  ----------------------------------------------------------------------------------------------------------------------  Detailed radiology reports for the last 24 hours:    Imaging Results (Last 24 Hours)     Procedure Component Value Units Date/Time    XR Abdomen Flat & Upright [[id number]] Collected: 10/09/21 1137     Updated: 10/09/21 1141    Narrative:      EXAM:    XR Abdomen, 2 Views     EXAM DATE:    10/9/2021 8:01 AM     CLINICAL HISTORY:    intra abdominal air; R11.2-Nausea with vomiting, unspecified;  R10.9-Unspecified abdominal pain     TECHNIQUE:    Frontal view of the abdomen/pelvis with upright view of the abdomen.     COMPARISON:    No relevant prior studies available.     FINDINGS:    Intraperitoneal space:  No radiographic evidence of free air is  identified.    Gastrointestinal tract:  Air-fluid levels of small and large bowel  representing ileus pattern commonly in the setting of enteritis.  No  bowel obstruction is noted.    Bones/joints:  Unremarkable.    Vasculature:  No visible portal venous gas is noted on x-ray.       Impression:      1.  No radiographic evidence of free air.  2.  Mild ileus pattern.  3.  No bowel obstruction.     This report was finalized on 10/9/2021 11:38 AM by Dr. Yunier Richardson MD.       XR Chest PA & Lateral [[id number]] Collected: 10/09/21 1137     Updated: 10/09/21 1139    Narrative:      EXAM:    XR Chest, 2 Views     EXAM DATE:    10/9/2021 10:29 AM     CLINICAL HISTORY:    intra-abdominal air; R11.2-Nausea with vomiting, unspecified;  R10.9-Unspecified abdominal pain     TECHNIQUE:    Frontal  and lateral views of the chest.     COMPARISON:    10/08/2021     FINDINGS:    Lungs:  Linear atelectasis.    Pleural space:  Unremarkable.  No pneumothorax.    Heart:  CABG.    Mediastinum:  Unremarkable.    Bones/joints:  Old left rib fractures.       Impression:        Mild linear atelectasis. Otherwise stable chest with no acute  cardiopulmonary findings.     This report was finalized on 10/9/2021 11:37 AM by Dr. Yunier Richardson MD.           Assessment & Plan      Generalized ileus  Portal vein free air  Diverticulosis without diverticulitis    -Etiology includes generalized ileus, perforated peptic ulcer, intra-abdominal sepsis such as an infectious colitis.    -Continue broad-spectrum antibiotics with cefepime and Flagyl    -N.p.o. for now until seen by general surgery however suspect will be able to be advanced to a liquid diet.    -As needed analgesics and antiemetics    CAD s/p CABG    -Continue beta-blocker, aspirin if no surgical intervention performed or planned.    HFpEF    -Appears euvolemic at this time    -Echo from June 29, 2021 shows EF 51 to 55%      CKD stage IIIa    -Supportive care, ensure adequate hydration to avoid intravascular depletion.    Essential hypertension    -Currently holding home antihypertensives given normotension    History of paroxysmal atrial fibrillation    -Has remained in normal sinus rhythm    -Does not appear to be chronically anticoagulated, continue home beta-blocker    History of prostate cancer     -Status post radical prostatectomy with salvage radiation    Obesity    -Complicates all aspects of care    VTE Prophylaxis:   Mechanical Order History:     None      Pharmalogical Order History:      Ordered     Dose Route Frequency Stop    10/08/21 2042  heparin (porcine) 5000 UNIT/ML injection 5,000 Units         5,000 Units SC Every 12 Hours Scheduled --                Disposition Home once medically stable and improved.    Phillip Oakley DO  [facility]  Hospitalist  10/09/21  20:40 EDT

## 2021-10-10 NOTE — PROGRESS NOTES
[facility] HOSPITALIST PROGRESS NOTE     Patient Identification:  Name:  Angelito Carter  Age:  86 y.o.  Sex:  male  :  1935  MRN:  [id number]  Visit Number:  [id number]  ROOM: [place] [address]     Primary Care Provider:  Heidy Tabares APRN    Length of stay in inpatient status:  2    Subjective     Chief Compliant:    Chief Complaint   Patient presents with   • Vomiting       History of Presenting Illness: Patient seen and evaluated in follow-up for generalized ileus with intra-abdominal portal venous gas without acute surgical abdomen.  Patient feeling well today on wanting to return home.  He denies any abdominal pain, nausea.  He has had a bowel movement today.    Objective     Current Hospital Meds:cefepime, 2 g, Intravenous, Q12H  famotidine, 20 mg, Intravenous, Daily  heparin (porcine), 5,000 Units, Subcutaneous, Q12H  lactobacillus acidophilus, 1 capsule, Oral, Daily  metoprolol succinate XL, 25 mg, Oral, Daily  metroNIDAZOLE, 500 mg, Intravenous, Q8H  rosuvastatin, 5 mg, Oral, Daily  sodium chloride, 3 mL, Intravenous, Q12H  tamsulosin, 0.4 mg, Oral, Nightly  traMADol, 50 mg, Oral, BID         Current Antimicrobial Therapy:  Anti-Infectives (From admission, onward)    Ordered     Dose/Rate Route Frequency Start Stop    10/09/21 0031  cefepime 2 gm IVPB in 100 ml NS (VTB)        Ordering Provider: Anamika Angela PA-C    2 g  over 4 Hours Intravenous Every 12 Hours 10/09/21 0930 10/14/21 0929    10/09/21 0031  cefepime 2 gm IVPB in 100 ml NS (VTB)        Ordering Provider: Anamika Angela PA-C    2 g  over 30 Minutes Intravenous Once 10/09/21 0130 10/09/21 0539    10/08/21 1930  metroNIDAZOLE (FLAGYL) 500 mg/100mL IVPB        Ordering Provider: Ashleigh Hale MD    500 mg  over 30 Minutes Intravenous Every 8 Hours 10/08/21 1931 10/13/21 1931        Current Diuretic Therapy:  Diuretics (From admission, onward)    None         ----------------------------------------------------------------------------------------------------------------------  Vital Signs:  Temp:  [97.5 °F (36.4 °C)-98.7 °F (37.1 °C)] 97.5 °F (36.4 °C)  Heart Rate:  [58-64] 58  Resp:  [12-19] 17  BP: (120-136)/(66-68) 122/67  SpO2:  [91 %-96 %] 92 %  on  Flow (L/min):  [4] 4;   Device (Oxygen Therapy): room air  Body mass index is 29.58 kg/m².    Wt Readings from Last 3 Encounters:   10/10/21 93.5 kg (206 lb 2.1 oz)   08/19/21 95.3 kg (210 lb)   07/22/21 95.3 kg (210 lb 1.6 oz)     Intake & Output (last 3 days)       10/07 0701  10/08 0700 10/08 0701  10/09 0700 10/09 0701  10/10 0700 10/10 0701  10/11 0700    P.O.   320 360    I.V. (mL/kg)  301.4 (3.2) 485 (5.2)     IV Piggyback  1000      Total Intake(mL/kg)  1301.4 (14) 805 (8.6) 360 (3.9)    Net  +1301.4 +805 +360            Urine Unmeasured Occurrence   4 x 2 x    Stool Unmeasured Occurrence    1 x        Diet Clear Liquid  ----------------------------------------------------------------------------------------------------------------------  Physical exam:  Constitutional:  Well-developed and well-nourished.  No acute distress.      HENT:  Head:  Normocephalic and atraumatic.  Mouth:  Moist mucous membranes.    Eyes:  Conjunctivae and EOM are normal. No scleral icterus.    Neck:  Neck supple.  No JVD present.    Cardiovascular:  Normal rate, regular rhythm and normal heart sounds with no murmur.  Pulmonary/Chest:  No respiratory distress, no wheezes, no crackles, with normal breath sounds and good air movement.  Abdominal:  Soft.  Bowel sounds are hyperactive.  There is mild distention and and right lower quadrant tenderness has resolved.  Musculoskeletal:  No edema, no tenderness, and no deformity.  No red or swollen joints anywhere.  Functional ROM intact.   Neurological:  Alert and oriented to person, place, and time.  No cranial nerve deficit.  No tongue deviation.  No facial droop.  No slurred speech.  Intact Sensation throughout  Skin:  Skin is warm and dry. No rash or lesion noted. No pallor.   Peripheral vascular:  Pulses in all 4 extremities with no clubbing, no cyanosis, no edema.  Psychiatric: Appropriate mood and affect, pleasant.   ----------------------------------------------------------------------------------------------------------------------  Tele:    ----------------------------------------------------------------------------------------------------------------------  Results from last 7 days   Lab Units 10/10/21  0500 10/09/21  0128 10/08/21  2121 10/08/21  1732   CRP mg/dL  --  1.35*  --   --    LACTATE mmol/L  --   --  1.5  --    WBC 10*3/mm3 7.68 12.74*  --  11.16*   HEMOGLOBIN g/dL 11.7* 13.7  --  13.2   HEMATOCRIT % 36.9* 43.5  --  41.0   MCV fL 96.9 96.5  --  95.3   MCHC g/dL 31.7 31.5  --  32.2   PLATELETS 10*3/mm3 204 249  --  254         Results from last 7 days   Lab Units 10/10/21  0500 10/09/21  0128 10/08/21  1732   SODIUM mmol/L 138 133* 134*   POTASSIUM mmol/L 4.5 4.4 4.3   MAGNESIUM mg/dL  --  2.0 2.0   CHLORIDE mmol/L 107 98 99   CO2 mmol/L 23.0 22.4 24.8   BUN mg/dL 22 24* 23   CREATININE mg/dL 1.41* 1.24 1.36*   EGFR IF NONAFRICN AM mL/min/1.73 48* 55* 50*   CALCIUM mg/dL 8.2* 8.8 9.2   GLUCOSE mg/dL 87 132* 173*   ALBUMIN g/dL  --  4.12 4.30   BILIRUBIN mg/dL  --  0.2 0.3   ALK PHOS U/L  --  77 81   AST (SGOT) U/L  --  29 32   ALT (SGPT) U/L  --  39 41   Estimated Creatinine Clearance: 44 mL/min (A) (by C-G formula based on SCr of 1.41 mg/dL (H)).  No results found for: AMMONIA              Hemoglobin A1C   Date/Time Value Ref Range Status   10/09/2021 0128 5.70 (H) 4.80 - 5.60 % Final     Glucose   Date/Time Value Ref Range Status   10/08/2021 2303 124 70 - 130 mg/dL Final     Comment:     Meter: [id number] : [id number] JEN VILLARREAL     Lab Results   Component Value Date    TSH 1.170 10/09/2021    FREET4 1.15 04/13/2015     No results found for: PREGTESTUR, PREGSERUM,  HCG, HCGQUANT  Pain Management Panel     Pain Management Panel Latest Ref Rng & Units 3/25/2015    CREATININE UR mg/dL 202.6        Brief Urine Lab Results  (Last result in the past 365 days)      Color   Clarity   Blood   Leuk Est   Nitrite   Protein   CREAT   Urine HCG        10/09/21 0353 Yellow   Clear   Negative   Negative   Negative   Negative               No results found for: BLOODCX  No results found for: URINECX  No results found for: WOUNDCX  No results found for: STOOLCX  No results found for: RESPCX  No results found for: AFBCX  Results from last 7 days   Lab Units 10/09/21  0128 10/08/21  2121   LACTATE mmol/L  --  1.5   CRP mg/dL 1.35*  --        I have personally looked at the labs and they are summarized above.  ----------------------------------------------------------------------------------------------------------------------  Detailed radiology reports for the last 24 hours:    Imaging Results (Last 24 Hours)     ** No results found for the last 24 hours. **        Assessment & Plan      Generalized ileus  Portal vein free air  Adenovirus, Sapovirus infection  Diverticulosis without diverticulitis    -Etiology includes generalized ileus, perforated peptic ulcer, intra-abdominal sepsis such as an infectious colitis.  GI PCR panel returned positive for adenovirus and Sapovirus.    -Continue broad-spectrum antibiotics with cefepime and Flagyl for now given concern for possible compromise of bowel wall given portal vein free air and generalized ileus.  However suspect patient will soon be able to stepdown to Augmentin and may be able to return home soon as he is having bowel movements and no abdominal pain now.    -Have advance diet to GI soft and if tolerates well likely could be discharged home in the next 24 hours    -As needed analgesics and antiemetics    HFpEF  CAD s/p CABG    -Continue beta-blocker, aspirin if no surgical intervention performed or planned.    -Appears euvolemic at this  time    -Echo from June 29, 2021 shows EF 51 to 55%      CKD stage IIIa    -Supportive care, ensure adequate hydration to avoid intravascular depletion.    Essential hypertension    -Currently holding home antihypertensives given normotension    History of paroxysmal atrial fibrillation    -Has remained in normal sinus rhythm    -Does not appear to be chronically anticoagulated, continue home beta-blocker and will restart aspirin as surgery with no plans for intervention at this time.    History of prostate cancer     -Status post radical prostatectomy with salvage radiation    Obesity    -Complicates all aspects of care    VTE Prophylaxis:   Mechanical Order History:     None      Pharmalogical Order History:      Ordered     Dose Route Frequency Stop    10/08/21 2042  heparin (porcine) 5000 UNIT/ML injection 5,000 Units         5,000 Units SC Every 12 Hours Scheduled --                Disposition Home once medically stable and improved.    Phillip Oakley DO  [facility] Hospitalist  10/10/21  13:34 EDT

## 2021-10-10 NOTE — PROGRESS NOTES
LOS: 2 days   Patient Care Team:  Heidy Tabares APRN as PCP - General (Family Medicine)    Surgery follow up for abdominal pain and portal venous gas    Subjective     Interval History:  Patient states he has had no further diarrhea.  He has had no further abdominal pain.  His stool panel did return positive for adenovirus and Charlie virus.  Repeat CT scan of the abdomen and pelvis was performed which demonstrated interval resolution of the gas.    History taken from patient.      Review of Systems:   Review of systems negative except for history of present illness    Objective     Vital Signs  Temp:  [97.5 °F (36.4 °C)-98.1 °F (36.7 °C)] 97.7 °F (36.5 °C)  Heart Rate:  [58-72] 66  Resp:  [12-19] 17  BP: (122-140)/(66-79) 140/70    Physical Exam:  General:  This is a WD WN male in no acute distress  Lungs:  Respiratory effort normal. Auscultation: Clear, without wheezes, rhonchi, rales  Heart:  Regular rate and rhythm, without murmur, gallop, rub.  No pedal edema  Abdomen: Active bowel sounds.  Nontender       Results Review:        Results from last 7 days   Lab Units 10/10/21  0500 10/09/21  0128 10/08/21  2121 10/08/21  1732   CRP mg/dL  --  1.35*  --   --    LACTATE mmol/L  --   --  1.5  --    WBC 10*3/mm3 7.68 12.74*  --  11.16*   HEMOGLOBIN g/dL 11.7* 13.7  --  13.2   HEMATOCRIT % 36.9* 43.5  --  41.0   PLATELETS 10*3/mm3 204 249  --  254         Results from last 7 days   Lab Units 10/10/21  0500 10/09/21  0128 10/08/21  1732   SODIUM mmol/L 138 133* 134*   POTASSIUM mmol/L 4.5 4.4 4.3   MAGNESIUM mg/dL  --  2.0 2.0   CHLORIDE mmol/L 107 98 99   CO2 mmol/L 23.0 22.4 24.8   BUN mg/dL 22 24* 23   CREATININE mg/dL 1.41* 1.24 1.36*   EGFR IF NONAFRICN AM mL/min/1.73 48* 55* 50*   CALCIUM mg/dL 8.2* 8.8 9.2   GLUCOSE mg/dL 87 132* 173*   ALBUMIN g/dL  --  4.12 4.30   BILIRUBIN mg/dL  --  0.2 0.3   ALK PHOS U/L  --  77 81   AST (SGOT) U/L  --  29 32   ALT (SGPT) U/L  --  39 41   Estimated Creatinine  Clearance: 44 mL/min (A) (by C-G formula based on SCr of 1.41 mg/dL (H)).  No results found for: AMMONIA      Blood Culture   Date Value Ref Range Status   10/09/2021 No growth at 24 hours  Preliminary   10/09/2021 No growth at 24 hours  Preliminary     No results found for: URINECX  No results found for: WOUNDCX  No results found for: STOOLCX    Imaging:  Imaging Results (Last 24 Hours)     Procedure Component Value Units Date/Time    CT Abdomen Pelvis Without Contrast [[id number]] Collected: 10/10/21 1552     Updated: 10/10/21 1558    Narrative:      EXAM:    CT Abdomen and Pelvis Without Intravenous Contrast     EXAM DATE:    10/10/2021 3:39 PM     CLINICAL HISTORY:    follow up portal venous gas; R11.2-Nausea with vomiting, unspecified;  R10.9-Unspecified abdominal pain     TECHNIQUE:    Axial computed tomography images of the abdomen and pelvis without  intravenous contrast.  Sagittal and coronal reformatted images were  created and reviewed.  This CT exam was performed using one or more of  the following dose reduction techniques:  automated exposure control,  adjustment of the mA and/or kV according to patient size, and/or use of  iterative reconstruction technique.     COMPARISON:    10/08/2021     FINDINGS:    Lung bases:  Mild basilar atelectasis. No consolidative airspace  disease identified.    Heart:  Mild cardiomegaly and changes of prior CABG.    Mediastinum:  Tiny hiatal hernia.      ABDOMEN:    Liver:  Previously described portal venous gas within the liver has  completely resolved.  Stable appearance of liver.    Gallbladder and bile ducts:  Unremarkable.  No calcified stones.  No  ductal dilation.    Pancreas:  Stable appearance of pancreas.  No ductal dilation.    Spleen:  Stable spleen.    Adrenals:  Stable adrenals.    Kidneys and ureters:  Stable kidneys.  No obstructing stones.  No  hydronephrosis.    Stomach and bowel:  Ileus pattern has improved with no loops of  distended small or large  bowel identified.  Stable diverticulosis  without evidence of acute diverticulitis.  There are no obvious segments  of abnormal small bowel wall thickening.      PELVIS:    Appendix:  Normal appendix is noted.    Bladder:  Unremarkable.  No stones.    Reproductive:  Unremarkable as visualized.      ABDOMEN and PELVIS:    Intraperitoneal space:  No pneumoperitoneum is identified.  No  significant fluid collection.    Bones/joints:  Stable bony structures.  No acute fracture.  No  dislocation.    Soft tissues:  Unremarkable.    Vasculature:  Previously described mesenteric vein gas has resolved  since the previous exam.    Lymph nodes:  Unremarkable.  No enlarged lymph nodes.       Impression:      1.  Interval resolution of portal venous gas and mesenteric venous gas.  No pneumoperitoneum.  2.  Improvement in bowel gas pattern with persistent but improved  changes of mild ileus. No bowel obstruction.  3.  No segments of abnormal bowel wall thickening identified on today's  exam.  4.  Bibasilar atelectasis. Stable diverticulosis without diverticulitis.  5.  Other incidental and nonacute findings as above appear stable.     This report was finalized on 10/10/2021 3:56 PM by Dr. Yunier Richardson MD.           Impression:  Portal venous and mesenteric gas secondary to gastroenteritis with no evidence of bowel ischemia    Plan:  Patient is surgically stable for discharge when okay with the medical service    Amber Mustafa MD  10/10/21  16:43 EDT      Please note that portions of this note were completed with a voice recognition program.

## 2021-10-11 ENCOUNTER — READMISSION MANAGEMENT (OUTPATIENT)
Dept: CALL CENTER | Facility: HOSPITAL | Age: 86
End: 2021-10-11

## 2021-10-11 VITALS
SYSTOLIC BLOOD PRESSURE: 122 MMHG | DIASTOLIC BLOOD PRESSURE: 66 MMHG | RESPIRATION RATE: 20 BRPM | HEART RATE: 65 BPM | BODY MASS INDEX: 29.19 KG/M2 | WEIGHT: 203.9 LBS | HEIGHT: 70 IN | OXYGEN SATURATION: 94 % | TEMPERATURE: 97.6 F

## 2021-10-11 LAB
ANION GAP SERPL CALCULATED.3IONS-SCNC: 9.2 MMOL/L (ref 5–15)
BUN SERPL-MCNC: 18 MG/DL (ref 8–23)
BUN/CREAT SERPL: 15.8 (ref 7–25)
CALCIUM SPEC-SCNC: 8.6 MG/DL (ref 8.6–10.5)
CHLORIDE SERPL-SCNC: 102 MMOL/L (ref 98–107)
CO2 SERPL-SCNC: 24.8 MMOL/L (ref 22–29)
CREAT SERPL-MCNC: 1.14 MG/DL (ref 0.76–1.27)
GFR SERPL CREATININE-BSD FRML MDRD: 61 ML/MIN/1.73
GLUCOSE SERPL-MCNC: 91 MG/DL (ref 65–99)
POTASSIUM SERPL-SCNC: 4 MMOL/L (ref 3.5–5.2)
SODIUM SERPL-SCNC: 136 MMOL/L (ref 136–145)

## 2021-10-11 PROCEDURE — 99238 HOSP IP/OBS DSCHRG MGMT 30/<: CPT | Performed by: INTERNAL MEDICINE

## 2021-10-11 PROCEDURE — 80048 BASIC METABOLIC PNL TOTAL CA: CPT | Performed by: STUDENT IN AN ORGANIZED HEALTH CARE EDUCATION/TRAINING PROGRAM

## 2021-10-11 PROCEDURE — 25010000002 CEFEPIME PER 500 MG: Performed by: PHYSICIAN ASSISTANT

## 2021-10-11 PROCEDURE — 25010000002 HEPARIN (PORCINE) PER 1000 UNITS: Performed by: INTERNAL MEDICINE

## 2021-10-11 RX ORDER — METRONIDAZOLE 500 MG/1
500 TABLET ORAL 3 TIMES DAILY
Qty: 9 TABLET | Refills: 0 | Status: SHIPPED | OUTPATIENT
Start: 2021-10-11 | End: 2021-10-14

## 2021-10-11 RX ORDER — CEFDINIR 300 MG/1
300 CAPSULE ORAL 2 TIMES DAILY
Qty: 6 CAPSULE | Refills: 0 | Status: SHIPPED | OUTPATIENT
Start: 2021-10-11 | End: 2021-10-14

## 2021-10-11 RX ADMIN — SODIUM CHLORIDE, PRESERVATIVE FREE 3 ML: 5 INJECTION INTRAVENOUS at 10:03

## 2021-10-11 RX ADMIN — SODIUM CHLORIDE, PRESERVATIVE FREE 10 ML: 5 INJECTION INTRAVENOUS at 10:03

## 2021-10-11 RX ADMIN — Medication 1 CAPSULE: at 09:44

## 2021-10-11 RX ADMIN — METOPROLOL SUCCINATE 25 MG: 25 TABLET, EXTENDED RELEASE ORAL at 09:44

## 2021-10-11 RX ADMIN — FAMOTIDINE 20 MG: 10 INJECTION INTRAVENOUS at 10:13

## 2021-10-11 RX ADMIN — HEPARIN SODIUM 5000 UNITS: 5000 INJECTION INTRAVENOUS; SUBCUTANEOUS at 09:44

## 2021-10-11 RX ADMIN — METRONIDAZOLE 500 MG: 500 INJECTION, SOLUTION INTRAVENOUS at 04:03

## 2021-10-11 RX ADMIN — CEFEPIME HYDROCHLORIDE 2 G: 2 INJECTION, POWDER, FOR SOLUTION INTRAVENOUS at 09:44

## 2021-10-11 NOTE — PLAN OF CARE
Goal Outcome Evaluation:  Plan of Care Reviewed With: patient           Outcome Summary: Pt transferred this shift from PCU. Pt stated that he got a little nauseated on the ride up to the floor. Pt voices no other complaints. VSS. No s/s of acute distress noted. Will cont to follow POC.

## 2021-10-11 NOTE — DISCHARGE SUMMARY
[facility] HOSPITALISTS DISCHARGE SUMMARY    Patient Identification:  Name:  Angelito Carter  Age:  86 y.o.  Sex:  male  :  1935  MRN:  [id number]  Visit Number:  [id number]    Date of Admission: 10/8/2021  Date of Discharge:  10/11/2021     PCP: Heidy Tabares APRN    DISCHARGE DIAGNOSIS  Viral Gastroenteritis 2/2 Adenovirus/Sapovirus  Portal Venous Gas - Resolved  Mesenteric Venous Gas - Resolved  Generalized Ileus - Resolved  HTN  HLD  CAD s/p CABG  Cardiomegaly/Diastolic Dysfunction/Chronic HFpEF  CKDIIIa  Hx Prostate CA s/p Radical prostatectomy and subsequent salvage radiation therapy  pAfib  Obesity by BMI    CONSULTS   General Surgery    PROCEDURES PERFORMED  None    HOSPITAL COURSE  86M Obese by BMI PMH ASCVD s/p CABG, essential hypertension, hyperlipidemia, chronic diastolic CHF, history of prostate cancer s/p radical prostatectomy and radiation, that presented to the [facility] emergency department for evaluation of emesis on 10/08.  Patient of note was found to be positive for Adenovirus and Sapovirus.  Also of note he had CT imaging showing portal venous gas and mesenteric venous gas w/ generalized ileus and mild wall thickening but no widespread pneumoperitoneum or ascites or bowel obstruction identified, also noted diverticulosis w/ diverticulitis.  Surgery consulted and rec'd no surgical intervention.  Repeat CT showed resolution of above mentioned gas patterns.  Patient was treated w/ IV Abx and Bcx's have remained NGTD.  Patient has not had any significant diarrhea last few days. He is tolerating PO and ambulatory.  He states he feels at his baseline other than had some mild nausea this AM but feels he is ready to go home.  Patient will complete short course of oral Abx at home.  He will follow up w/ his PCP on Friday for basic labs and BP check.  His BP has been lower while admitted and have held home Amlodipine and ACEI at time of discharge.     VITAL  SIGNS:  Temp:  [97.5 °F (36.4 °C)-98.5 °F (36.9 °C)] 97.6 °F (36.4 °C)  Heart Rate:  [62-72] 65  Resp:  [18-20] 20  BP: (103-141)/(64-79) 122/66  SpO2:  [92 %-98 %] 94 %  on   ;   Device (Oxygen Therapy): room air    Body mass index is 29.26 kg/m².  Wt Readings from Last 3 Encounters:   10/11/21 92.5 kg (203 lb 14.4 oz)   08/19/21 95.3 kg (210 lb)   07/22/21 95.3 kg (210 lb 1.6 oz)     PHYSICAL EXAM:  Constitutional:  Well-developed and well-nourished.  No respiratory distress.      HENT:  Head:  Normocephalic and atraumatic.  Mouth:  Moist mucous membranes.    Eyes:  Conjunctivae and EOM are normal.  No scleral icterus.    Neck:  Neck supple.  No JVD present.    Cardiovascular:  Normal rate, regular rhythm and normal heart sounds with no murmur.  Pulmonary/Chest:  No respiratory distress, no wheezes, on room air  Abdominal:  Soft. No distension and no tenderness.   Musculoskeletal:  No tenderness, and no deformity.  No red or swollen joints anywhere.    Neurological:  Alert and oriented to person, place, and time.  No cranial nerve deficit.    Skin:  Skin is warm and dry. No rash noted. No pallor.   Peripheral vascular: no clubbing, no cyanosis, no edema.    DISCHARGE DISPOSITION   Stable    DISCHARGE MEDICATIONS:     Discharge Medications      New Medications      Instructions Start Date   cefdinir 300 MG capsule  Commonly known as: OMNICEF   300 mg, Oral, 2 Times Daily      metroNIDAZOLE 500 MG tablet  Commonly known as: Flagyl   500 mg, Oral, 3 Times Daily         Continue These Medications      Instructions Start Date   aspirin 81 MG tablet   81 mg, Oral, Daily      cyclobenzaprine 10 MG tablet  Commonly known as: FLEXERIL   5 mg, Oral, Nightly PRN      Diclofenac Sodium 1 % gel gel  Commonly known as: VOLTAREN   2 g, Topical, 4 Times Daily PRN      magnesium oxide 400 MG tablet  Commonly known as: MAG-OX   400 mg, Oral, Daily      metoprolol succinate XL 25 MG 24 hr tablet  Commonly known as: TOPROL-XL   TAKE  ONE TABLET BY MOUTH DAILY      multivitamin with minerals tablet tablet   1 tablet, Oral, Daily      OSTEO BI-FLEX REGULAR STRENGTH PO   Oral      rosuvastatin 5 MG tablet  Commonly known as: CRESTOR   5 mg, Oral, Nightly      tamsulosin 0.4 MG capsule 24 hr capsule  Commonly known as: FLOMAX   1 capsule, Oral, Nightly      traMADol 50 MG tablet  Commonly known as: ULTRAM   50 mg, Oral, 2 Times Daily PRN      triamcinolone 0.5 % cream  Commonly known as: KENALOG   1 application, Topical, 4 Times Daily, Prior to [facility] Admission, Patient was on:  Apply to rash       vitamin B-12 1000 MCG tablet  Commonly known as: CYANOCOBALAMIN   1,000 mcg, Oral, Daily      Vitamin D3 50 MCG (2000 UT) tablet   2,000 Units, Oral, Daily         Stop These Medications    amLODIPine 10 MG tablet  Commonly known as: NORVASC     lisinopril 10 MG tablet  Commonly known as: PRINIVIL,ZESTRIL            Activity Instructions     Activity as Tolerated          Additional Instructions for the Follow-ups that You Need to Schedule     Discharge Follow-up with PCP   As directed       Currently Documented PCP:    Heidy Tabares APRN    PCP Phone Number:    [phone]     Follow Up Details: Friday, needs BP checked, needs basic labs            Follow-up Information     Heidy Tabares APRN .    Specialty: Family Medicine  Why: Friday, needs BP checked, needs basic labs  Contact information:  [address] [address] [phone]                        TEST  RESULTS PENDING AT DISCHARGE  Pending Labs     Order Current Status    Blood Culture - Blood, Arm, Left Preliminary result    Blood Culture - Blood, Arm, Right Preliminary result        CODE STATUS  Code Status and Medical Interventions:   Ordered at: 10/08/21 1948     Level Of Support Discussed With:    Patient     Code Status:    CPR     Medical Interventions (Level of Support Prior to Arrest):    Full     Donato Narvaez MD  [facility]  Hospitalist  10/11/21  10:00 EDT    Please note that this discharge summary required 25 minutes to complete.

## 2021-10-11 NOTE — PLAN OF CARE
Goal Outcome Evaluation:               Pt aox4, VSS, denies any complaints w/ no s/s of acute distress noted. Plan for discharge today, IV access and telemetry monitoring removed, pt tolerated well.

## 2021-10-11 NOTE — CASE MANAGEMENT/SOCIAL WORK
Discharge Planning Assessment   [facility]     Patient Name: Angelito Carter  MRN: [id number]  Today's Date: 10/11/2021    Admit Date: 10/8/2021       Discharge Plan     Row Name 10/11/21 1125       Plan    Final Discharge Disposition Code 01 - home or self-care    Final Note Pt to be discharged home.           LALITA RothW

## 2021-10-12 NOTE — OUTREACH NOTE
Prep Survey      Responses   [facility] facility patient discharged from? [place]   Is LACE score < 7 ? No   Emergency Room discharge w/ pulse ox? Yes   Eligibility Readm Mgmt   Discharge diagnosis Viral Gastroenteritis 2/2 Adenovirus/Sapovirus   Does the patient have one of the following disease processes/diagnoses(primary or secondary)? Other   Does the patient have Home health ordered? No   Is there a DME ordered? No   Prep survey completed? Yes          Dinah Leal RN

## 2021-10-14 ENCOUNTER — READMISSION MANAGEMENT (OUTPATIENT)
Dept: CALL CENTER | Facility: HOSPITAL | Age: 86
End: 2021-10-14

## 2021-10-14 ENCOUNTER — OFFICE VISIT (OUTPATIENT)
Dept: UROLOGY | Facility: CLINIC | Age: 86
End: 2021-10-14

## 2021-10-14 VITALS
HEIGHT: 70 IN | DIASTOLIC BLOOD PRESSURE: 65 MMHG | SYSTOLIC BLOOD PRESSURE: 120 MMHG | BODY MASS INDEX: 29.19 KG/M2 | WEIGHT: 203.93 LBS

## 2021-10-14 DIAGNOSIS — C61 CA OF PROSTATE (HCC): Primary | ICD-10-CM

## 2021-10-14 LAB
BACTERIA SPEC AEROBE CULT: NORMAL
BACTERIA SPEC AEROBE CULT: NORMAL
PSA SERPL-MCNC: <0.014 NG/ML (ref 0–4)

## 2021-10-14 PROCEDURE — 36415 COLL VENOUS BLD VENIPUNCTURE: CPT | Performed by: UROLOGY

## 2021-10-14 PROCEDURE — 84153 ASSAY OF PSA TOTAL: CPT | Performed by: UROLOGY

## 2021-10-14 PROCEDURE — 99213 OFFICE O/P EST LOW 20 MIN: CPT | Performed by: UROLOGY

## 2021-10-14 NOTE — PROGRESS NOTES
"Chief Complaint:          Chief Complaint   Patient presents with   • Benign Prostatic Hypertrophy       HPI:   86 y.o. male. status post a radical retropubic prostatectomy by Dr. Dobbins 3 years ago he is doing great he has no stress leakage she has occasional dribbling.  He has no erection problems his erection is not as strong as it was but is certainly adequate there is no frequency, urgency, there is no skeletal related events symptomatology there is no constitutional symptomatology he has an empty rectal fossa and his PSA was 0.20 noted on 7/19/17 Here for yearly check he has had a radical prostate radiation treatments he has had a PSA since 2019 he was hospitalized with a \"hole in his stomach\" but apparently did not have an operation currently doing well minimal stress leakage no other complaints PSA is pending      Past Medical History:        Past Medical History:   Diagnosis Date   • Acute myocardial infarction (HCC)    • Adenocarcinoma (HCC)     prostate gland   • Arthritis    • Heart attack (HCC)    • Hx of radiation therapy    • Hypertension    • Prostate cancer (HCC)          Current Meds:     Current Outpatient Medications   Medication Sig Dispense Refill   • aspirin 81 MG tablet Take 1 tablet by mouth Daily. 30 tablet 11   • cefdinir (OMNICEF) 300 MG capsule Take 1 capsule by mouth 2 (Two) Times a Day for 3 days. 6 capsule 0   • Cholecalciferol (Vitamin D3) 50 MCG (2000 UT) tablet Take 2,000 Units by mouth Daily.     • cyclobenzaprine (FLEXERIL) 10 MG tablet Take 5 mg by mouth At Night As Needed for Muscle Spasms.     • Diclofenac Sodium (VOLTAREN) 1 % gel gel Apply 2 g topically to the appropriate area as directed 4 (Four) Times a Day As Needed.     • Glucosamine-Chondroitin (OSTEO BI-FLEX REGULAR STRENGTH PO) Take  by mouth.     • magnesium oxide (MAG-OX) 400 MG tablet Take 400 mg by mouth Daily.     • metoprolol succinate XL (TOPROL-XL) 25 MG 24 hr tablet TAKE ONE TABLET BY MOUTH DAILY 90 tablet " 10   • metroNIDAZOLE (Flagyl) 500 MG tablet Take 1 tablet by mouth 3 (Three) Times a Day for 3 days. 9 tablet 0   • multivitamin with minerals tablet tablet Take 1 tablet by mouth Daily.     • rosuvastatin (CRESTOR) 5 MG tablet Take 5 mg by mouth Every Night.     • tamsulosin (FLOMAX) 0.4 MG capsule 24 hr capsule Take 1 capsule by mouth Every Night.     • traMADol (ULTRAM) 50 MG tablet Take 50 mg by mouth 2 (Two) Times a Day As Needed.     • triamcinolone (KENALOG) 0.5 % cream Apply 1 application topically to the appropriate area as directed 4 (Four) Times a Day. Prior to [facility] Admission, Patient was on:  Apply to rash     • vitamin B-12 (CYANOCOBALAMIN) 1000 MCG tablet Take 1,000 mcg by mouth Daily.       No current facility-administered medications for this visit.        Allergies:      Allergies   Allergen Reactions   • Adhesive Tape Rash        Past Surgical History:     Past Surgical History:   Procedure Laterality Date   • BIOPSY PROSTATE NEEDLE / PUNCH / INCISIONAL     • CORONARY ARTERY BYPASS GRAFT     • OTHER SURGICAL HISTORY      Coronary Artery Triple Arterial Byupass Graft   • PROSTATE SURGERY     • PROSTATECTOMY      Robotic-assisted   • SKIN CANCER EXCISION Left     removed from left ear   • VASECTOMY           Social History:     Social History     Socioeconomic History   • Marital status:    Tobacco Use   • Smoking status: Never Smoker   • Smokeless tobacco: Never Used   Vaping Use   • Vaping Use: Never used   Substance and Sexual Activity   • Alcohol use: Yes     Comment: once in awhile   • Drug use: Never   • Sexual activity: Defer       Family History:     Family History   Problem Relation Age of Onset   • Kidney disease Father    • Heart disease Father    • Heart attack Father    • Kidney disease Mother    • Heart disease Mother        Review of Systems:     Review of Systems   Constitutional: Negative.    HENT: Negative.    Eyes: Negative.    Respiratory: Negative.    Cardiovascular:  Negative.    Gastrointestinal: Negative.    Endocrine: Negative.    Musculoskeletal: Negative.    Allergic/Immunologic: Negative.    Neurological: Negative.    Hematological: Negative.    Psychiatric/Behavioral: Negative.        Physical Exam:     Physical Exam  Vitals and nursing note reviewed.   Constitutional:       Appearance: He is well-developed.   HENT:      Head: Normocephalic and atraumatic.   Eyes:      Conjunctiva/sclera: Conjunctivae normal.      Pupils: Pupils are equal, round, and reactive to light.   Cardiovascular:      Rate and Rhythm: Normal rate and regular rhythm.      Heart sounds: Normal heart sounds.   Pulmonary:      Effort: Pulmonary effort is normal.      Breath sounds: Normal breath sounds.   Abdominal:      General: Bowel sounds are normal.      Palpations: Abdomen is soft.   Musculoskeletal:         General: Normal range of motion.      Cervical back: Normal range of motion.   Skin:     General: Skin is warm and dry.   Neurological:      Mental Status: He is alert and oriented to person, place, and time.      Deep Tendon Reflexes: Reflexes are normal and symmetric.   Psychiatric:         Behavior: Behavior normal.         Thought Content: Thought content normal.         Judgment: Judgment normal.         I have reviewed the following portions of the patient's history: allergies, current medications, past family history, past medical history, past social history, past surgical history, problem list and ROS and confirm it's accurate.      Procedure:       Assessment/Plan:   Prostate cancer:  He returns today status post biopsy for extensive discussion of prostate cancer.  We discussed staging, and grading of the disease.  I described with a [place] system being from a 2-10 scale with the most common low-grade pattern being a Rancho 6.  I discussed the staging workup including a total body bone scan and CT scan especially with a PSA is greater than 10.  We discussed the various options at  length I discussed a radical retropubic prostatectomy done in the traditional fashion and using the robotic technique.  I then discussed radiation treatment both seed therapy and external beam therapy using Gold fiduciary markers.  We talked about some of the other alternatives such as a cryosurgical ablation at high intensity focused ultrasound as being viable alternatives but not recommended at this time.  He focused on aggressive watchful waiting and explained that currently low literature it's been discovered that a lot of men can actually observe it especially with numerous other comorbidities cannot have problems from the cancer by itself.  Talked about hormonal ablation.  In the side effects of creation of insulin resistance.  Overall, the patient was given appropriate literature is going to think about it and I'm going to revisit the topic with them after her next office visit  He remains castrate after radical prostatectomy and radiation treatments.  PSA is currently pending                  This document has been electronically signed by JAN SANDOVAL MD October 14, 2021 09:54 EDT

## 2021-10-14 NOTE — OUTREACH NOTE
Medical Week 1 Survey      Responses   [facility] patient discharged from? [facility]   Does the patient have one of the following disease processes/diagnoses(primary or secondary)? Other   Week 1 attempt successful? No   Unsuccessful attempts Attempt 1          Homa Espinosa RN

## 2021-10-18 ENCOUNTER — READMISSION MANAGEMENT (OUTPATIENT)
Dept: CALL CENTER | Facility: HOSPITAL | Age: 86
End: 2021-10-18

## 2021-10-18 NOTE — OUTREACH NOTE
Medical Week 1 Survey      Responses   [facility] patient discharged from? [facility]   Does the patient have one of the following disease processes/diagnoses(primary or secondary)? Other   Week 1 attempt successful? Yes   Call start time 1223   Call end time 1226   Discharge diagnosis Viral Gastroenteritis 2/2 Adenovirus/Sapovirus   Is patient permission given to speak with other caregiver? Yes   List who call center can speak with spouse   Meds reviewed with patient/caregiver? Yes   Is the patient having any side effects they believe may be caused by any medication additions or changes? No   Does the patient have all medications ordered at discharge? Yes   Is the patient taking all medications as directed (includes completed medication regime)? Yes   Comments regarding appointments Pt followed up with pcp today.   Does the patient have a primary care provider?  Yes   Does the patient have an appointment with their PCP within 7 days of discharge? Yes   Has the patient kept scheduled appointments due by today? N/A   Has home health visited the patient within 72 hours of discharge? N/A   Psychosocial issues? No   Did the patient receive a copy of their discharge instructions? Yes   Nursing interventions Reviewed instructions with patient   What is the patient's perception of their health status since discharge? Improving   Is the patient/caregiver able to teach back signs and symptoms related to disease process for when to call PCP? Yes   Is the patient/caregiver able to teach back signs and symptoms related to disease process for when to call 911? Yes   Is the patient/caregiver able to teach back the hierarchy of who to call/visit for symptoms/problems? PCP, Specialist, Home health nurse, Urgent Care, ED, 911 Yes   Week 1 call completed? Yes   Wrap up additional comments Spouse reports pt is feeling very well. Pt was out in the barn with a tractor at the time of our call. Pt followed up with pcp this morning. No  questions at this time.          Jannet Montanez RN

## 2021-10-25 ENCOUNTER — READMISSION MANAGEMENT (OUTPATIENT)
Dept: CALL CENTER | Facility: HOSPITAL | Age: 86
End: 2021-10-25

## 2021-10-25 NOTE — OUTREACH NOTE
Medical Week 2 Survey      Responses   [facility] patient discharged from? [place]   Does the patient have one of the following disease processes/diagnoses(primary or secondary)? Other   Week 2 attempt successful? Yes   Call start time 1519   Discharge diagnosis Viral Gastroenteritis 2/2 Adenovirus/Sapovirus   Call end time 1522   Person spoke with today (if not patient) and relationship Silvina-wife   Meds reviewed with patient/caregiver? Yes   Is the patient having any side effects they believe may be caused by any medication additions or changes? No   Does the patient have all medications ordered at discharge? Yes   Is the patient taking all medications as directed (includes completed medication regime)? Yes   Comments regarding appointments Ortho appt on 10/26/21   Does the patient have a primary care provider?  Yes   Has the patient kept scheduled appointments due by today? Yes   Has home health visited the patient within 72 hours of discharge? N/A   Psychosocial issues? No   Nursing interventions Reviewed instructions with patient   What is the patient's perception of their health status since discharge? Improving   Is the patient/caregiver able to teach back signs and symptoms related to disease process for when to call PCP? Yes   Is the patient/caregiver able to teach back signs and symptoms related to disease process for when to call 911? Yes   Is the patient/caregiver able to teach back the hierarchy of who to call/visit for symptoms/problems? PCP, Specialist, Home health nurse, Urgent Care, ED, 911 Yes   Week 2 Call Completed? Yes          CORIN HASTINGS RN

## 2021-10-26 ENCOUNTER — OFFICE VISIT (OUTPATIENT)
Dept: ORTHOPEDIC SURGERY | Facility: CLINIC | Age: 86
End: 2021-10-26

## 2021-10-26 VITALS — BODY MASS INDEX: 29.19 KG/M2 | HEIGHT: 70 IN | WEIGHT: 203.93 LBS

## 2021-10-26 DIAGNOSIS — M17.0 BILATERAL PRIMARY OSTEOARTHRITIS OF KNEE: Primary | ICD-10-CM

## 2021-10-26 PROCEDURE — 99213 OFFICE O/P EST LOW 20 MIN: CPT | Performed by: PHYSICIAN ASSISTANT

## 2021-10-26 PROCEDURE — 20610 DRAIN/INJ JOINT/BURSA W/O US: CPT | Performed by: PHYSICIAN ASSISTANT

## 2021-10-26 RX ORDER — LIDOCAINE HYDROCHLORIDE 10 MG/ML
5 INJECTION, SOLUTION EPIDURAL; INFILTRATION; INTRACAUDAL; PERINEURAL
Status: COMPLETED | OUTPATIENT
Start: 2021-10-26 | End: 2021-10-26

## 2021-10-26 RX ORDER — METHYLPREDNISOLONE ACETATE 80 MG/ML
80 INJECTION, SUSPENSION INTRA-ARTICULAR; INTRALESIONAL; INTRAMUSCULAR; SOFT TISSUE
Status: COMPLETED | OUTPATIENT
Start: 2021-10-26 | End: 2021-10-26

## 2021-10-26 RX ADMIN — LIDOCAINE HYDROCHLORIDE 5 ML: 10 INJECTION, SOLUTION EPIDURAL; INFILTRATION; INTRACAUDAL; PERINEURAL at 10:04

## 2021-10-26 RX ADMIN — METHYLPREDNISOLONE ACETATE 80 MG: 80 INJECTION, SUSPENSION INTRA-ARTICULAR; INTRALESIONAL; INTRAMUSCULAR; SOFT TISSUE at 10:04

## 2021-10-26 NOTE — PROGRESS NOTES
[facility] [place] Orthopaedic Surgery Established Patient Visit          Patient: Angelito Carter  YOB: 1935  Date of Encounter: 10/26/2021  PCP: Heidy Tabares APRN      Subjective     Chief Complaint   Patient presents with   • Left Knee - Pain, Follow-up   • Right Knee - Pain, Follow-up         Order  History of Present Illness:     Angelito Carter is a 86-year-old male with known bilateral knee osteoarthritis several years duration. The patient has been beneficial from previous conservative treatment and steroid injection into the left knee however has noted absence of alleviation in general recalcitrance to the right knee. He reports dull throbbing aching sensation to the right knee worse on range of motion weightbearing. Patient has undergone conservative treatment such as bracing, physical therapy, etc. Patient returns today with questions of viscosupplementation versus intra-articular injections for conservative treatment to avoid total knee arthroplasty.      Patient Active Problem List   Diagnosis   • CA of prostate (HCC)   • ASCVD (arteriosclerotic cardiovascular disease)   • History of ischemic cardiomyopathy   • HTN (hypertension)   • HLD (hyperlipidemia)   • HX OF Paroxysmal a-fib   • HX OF Chronic renal insufficiency   • Intra-abdominal free air of unknown etiology     Past Medical History:   Diagnosis Date   • Acute myocardial infarction (HCC)    • Adenocarcinoma (HCC)     prostate gland   • Arthritis    • Heart attack (HCC)    • Hx of radiation therapy    • Hypertension    • Prostate cancer (HCC)      Past Surgical History:   Procedure Laterality Date   • BIOPSY PROSTATE NEEDLE / PUNCH / INCISIONAL     • CORONARY ARTERY BYPASS GRAFT     • OTHER SURGICAL HISTORY      Coronary Artery Triple Arterial Byupass Graft   • PROSTATE SURGERY     • PROSTATECTOMY      Robotic-assisted   • SKIN CANCER EXCISION Left     removed from left ear   • VASECTOMY       Social History     Occupational History   • Not  on file   Tobacco Use   • Smoking status: Never Smoker   • Smokeless tobacco: Never Used   Vaping Use   • Vaping Use: Never used   Substance and Sexual Activity   • Alcohol use: Yes     Comment: once in awhile   • Drug use: Never   • Sexual activity: Defer    Angelito Carter  reports that he has never smoked. He has never used smokeless tobacco.. I have educated him on the risk of diseases from using tobacco products such as cancer, COPD and heart disease.          Social History     Social History Narrative   • Not on file     Family History   Problem Relation Age of Onset   • Kidney disease Father    • Heart disease Father    • Heart attack Father    • Kidney disease Mother    • Heart disease Mother      Current Outpatient Medications   Medication Sig Dispense Refill   • aspirin 81 MG tablet Take 1 tablet by mouth Daily. 30 tablet 11   • Cholecalciferol (Vitamin D3) 50 MCG (2000 UT) tablet Take 2,000 Units by mouth Daily.     • cyclobenzaprine (FLEXERIL) 10 MG tablet Take 5 mg by mouth At Night As Needed for Muscle Spasms.     • Diclofenac Sodium (VOLTAREN) 1 % gel gel Apply 2 g topically to the appropriate area as directed 4 (Four) Times a Day As Needed.     • Glucosamine-Chondroitin (OSTEO BI-FLEX REGULAR STRENGTH PO) Take  by mouth.     • magnesium oxide (MAG-OX) 400 MG tablet Take 400 mg by mouth Daily.     • metoprolol succinate XL (TOPROL-XL) 25 MG 24 hr tablet TAKE ONE TABLET BY MOUTH DAILY 90 tablet 10   • multivitamin with minerals tablet tablet Take 1 tablet by mouth Daily.     • rosuvastatin (CRESTOR) 5 MG tablet Take 5 mg by mouth Every Night.     • tamsulosin (FLOMAX) 0.4 MG capsule 24 hr capsule Take 1 capsule by mouth Every Night.     • traMADol (ULTRAM) 50 MG tablet Take 50 mg by mouth 2 (Two) Times a Day As Needed.     • triamcinolone (KENALOG) 0.5 % cream Apply 1 application topically to the appropriate area as directed 4 (Four) Times a Day. Prior to [facility], Patient was on:  Apply to rash  "    • vitamin B-12 (CYANOCOBALAMIN) 1000 MCG tablet Take 1,000 mcg by mouth Daily.       No current facility-administered medications for this visit.     Allergies   Allergen Reactions   • Adhesive Tape Rash            Review of Systems   Constitutional: Negative.   HENT: Negative.    Eyes: Negative.    Cardiovascular: Negative.    Respiratory: Negative.    Endocrine: Negative.    Hematologic/Lymphatic: Negative.    Skin: Negative.    Musculoskeletal:        Pertinent positives listed in HPI   Gastrointestinal: Negative.    Genitourinary: Negative.    Neurological: Negative.    Psychiatric/Behavioral: Negative.    Allergic/Immunologic: Negative.          Objective      Vitals:    10/26/21 0951   Weight: 92.5 kg (203 lb 14.8 oz)   Height: 177.8 cm (70\")     Patient's Body mass index is 29.26 kg/m². indicating that he is obese (BMI >30). Obesity-related health conditions include the following: Listed in PMH. Obesity is unchanged. BMI is is above average; BMI management plan is completed. We discussed portion control and increasing exercise..      Physical Exam  Vitals and nursing note reviewed.   Constitutional:       General: He is not in acute distress.     Appearance: Normal appearance. He is not ill-appearing.   HENT:      Head: Normocephalic and atraumatic.      Right Ear: External ear normal.      Left Ear: External ear normal.      Nose: Nose normal.      Mouth/Throat:      Mouth: Mucous membranes are moist.      Pharynx: Oropharynx is clear.   Eyes:      Extraocular Movements: Extraocular movements intact.      Conjunctiva/sclera: Conjunctivae normal.      Pupils: Pupils are equal, round, and reactive to light.   Cardiovascular:      Rate and Rhythm: Normal rate.      Pulses: Normal pulses.   Pulmonary:      Effort: Pulmonary effort is normal.   Abdominal:      General: There is no distension.   Musculoskeletal:      Cervical back: Normal range of motion. No rigidity.      Comments: Right knee examination " today reveals medial joint line tenderness to palpation. Patient is in a scant effusion with no instability on varus valgus stress. Lachman drawer testing negative. Remainder the neurovascular status grossly intact right lower extremity.   Skin:     General: Skin is warm and dry.      Capillary Refill: Capillary refill takes less than 2 seconds.   Neurological:      General: No focal deficit present.      Mental Status: He is alert and oriented to person, place, and time.      Cranial Nerves: Cranial nerves are intact.   Psychiatric:         Mood and Affect: Mood normal.         Behavior: Behavior normal.           Radiology:      X-rays: 2 views AP/lateral standing bilateral knees reveals tricompartmental moderate to severe osteoarthritis.  Medial joint space collapse bilaterally.  No acute osseous abnormalities.  No acute fractures or dislocations noted        Assessment/Plan        ICD-10-CM ICD-9-CM   1. Bilateral primary osteoarthritis of knee  M17.0 715.16       86-year-old male with notable bilateral knee osteoarthritis. The patient has responded with Zilretta injection in the left knee with exacerbation worsening and recalcitrant to previous injection in the right knee. He attributes only 5 days relief of pain symptoms. Conservative measures in effort to exhaust all conservative treatment before discussing total knee arthroplasty the patient was provided today with intra-articular steroidal injection 80 mg Depo-Medrol with lidocaine block into the intra-articular space of the right knee. Patient tolerated procedure well. He was instructed to return back in 3 weeks. Patient continues to have efficacy and decreased pain in the left knee and notes response with right knee discussed proceeding with bilateral knee viscosupplementation. Once again he wishes to exhaust all conservative treatment options before discussing total knee arthroplasty.    Large Joint Arthrocentesis: R knee  Date/Time: 10/26/2021 10:04  AM  Consent given by: patient  Site marked: site marked  Supporting Documentation  Indications: pain and diagnostic evaluation   Procedure Details  Location: knee - R knee  Needle size: 25 G  Approach: anterolateral  Medications administered: 80 mg methylPREDNISolone acetate 80 MG/ML; 5 mL lidocaine PF 1% 1 %  Patient tolerance: patient tolerated the procedure well with no immediate complications                        This document was signed by Washington Holt PA-C October 26, 2021     CC: Heidy Tabares APRN       EMR Dragon/Transcription disclaimer:  Part of this note may be completed utilizing the dragon speech recognition software. This electronic transcription/translation of spoken language to printed text may contain grammatical errors, random word insertions, pronoun errors, and incomplete sentences or occasional consequences of the system due to software limitations, ambient noise, and hardware issues.  Any questions or concerns about the content, text, or information contained within the body of this dictation should be directly addressed to the physician for clarification.

## 2021-11-01 ENCOUNTER — READMISSION MANAGEMENT (OUTPATIENT)
Dept: CALL CENTER | Facility: HOSPITAL | Age: 86
End: 2021-11-01

## 2021-11-01 NOTE — OUTREACH NOTE
Medical Week 3 Survey      Responses   [facility] patient discharged from? [place]   Does the patient have one of the following disease processes/diagnoses(primary or secondary)? Other   Week 3 attempt successful? No   Unsuccessful attempts Attempt 1          Jannet Montanez RN   Most Recent PHQ 2/9 Score     Date               - 6/22/2017     PHQ2 Score           - 0         PHQ9 Total Score  - No Value exists for the : [facility]#[id number]

## 2021-11-03 ENCOUNTER — READMISSION MANAGEMENT (OUTPATIENT)
Dept: CALL CENTER | Facility: HOSPITAL | Age: 86
End: 2021-11-03

## 2021-11-03 NOTE — OUTREACH NOTE
Medical Week 3 Survey      Responses   [facility] patient discharged from? [place]   Does the patient have one of the following disease processes/diagnoses(primary or secondary)? Other   Week 3 attempt successful? Yes   Call start time 1525   Call end time 1527   Discharge diagnosis Viral Gastroenteritis 2/2 Adenovirus/Sapovirus   Person spoke with today (if not patient) and relationship Silvina-wife/ patient   Meds reviewed with patient/caregiver? Yes   Is the patient having any side effects they believe may be caused by any medication additions or changes? No   Does the patient have all medications ordered at discharge? Yes   Is the patient taking all medications as directed (includes completed medication regime)? Yes   Comments regarding appointments Saw PCP 10/18/21   Does the patient have a primary care provider?  Yes   Does the patient have an appointment with their PCP within 7 days of discharge? Yes   Has the patient kept scheduled appointments due by today? Yes   Has home health visited the patient within 72 hours of discharge? N/A   Psychosocial issues? No   Did the patient receive a copy of their discharge instructions? Yes   Nursing interventions Reviewed instructions with patient   What is the patient's perception of their health status since discharge? Improving   Is the patient/caregiver able to teach back signs and symptoms related to disease process for when to call PCP? Yes   Is the patient/caregiver able to teach back signs and symptoms related to disease process for when to call 911? Yes   Is the patient/caregiver able to teach back the hierarchy of who to call/visit for symptoms/problems? PCP, Specialist, Home health nurse, Urgent Care, ED, 911 Yes   If the patient is a current smoker, are they able to teach back resources for cessation? Not a smoker   Week 3 Call Completed? Yes   Wrap up additional comments Patient states he has been outside working and feeling better.          Vera WATT  Farrukh RN

## 2021-11-10 ENCOUNTER — READMISSION MANAGEMENT (OUTPATIENT)
Dept: CALL CENTER | Facility: HOSPITAL | Age: 86
End: 2021-11-10

## 2021-11-10 NOTE — OUTREACH NOTE
Medical Week 4 Survey      Responses   [facility] patient discharged from? [place]   Does the patient have one of the following disease processes/diagnoses(primary or secondary)? Other   Week 4 attempt successful? Yes   Call start time 1549   Call end time 1551   Discharge diagnosis Viral Gastroenteritis 2/2 Adenovirus/Sapovirus   Meds reviewed with patient/caregiver? Yes   Is the patient having any side effects they believe may be caused by any medication additions or changes? No   Is the patient taking all medications as directed (includes completed medication regime)? Yes   Has the patient kept scheduled appointments due by today? Yes   Is the patient still receiving Home Health Services? N/A   Psychosocial issues? No   What is the patient's perception of their health status since discharge? Improving   Is the patient/caregiver able to teach back signs and symptoms related to disease process for when to call PCP? Yes   Is the patient/caregiver able to teach back signs and symptoms related to disease process for when to call 911? Yes   Is the patient/caregiver able to teach back the hierarchy of who to call/visit for symptoms/problems? PCP, Specialist, Home health nurse, Urgent Care, ED, 911 Yes   Week 4 Call Completed? Yes   Would the patient like one additional call? No   Graduated Yes   Is the patient interested in additional calls from an ambulatory ?  NOTE:  applies to high risk patients requiring additional follow-up. Yes   Did the patient feel the follow up calls were helpful during their recovery period? Yes   Was the number of calls appropriate? Yes          CORIN HASTINGS RN

## 2021-11-16 ENCOUNTER — OFFICE VISIT (OUTPATIENT)
Dept: ORTHOPEDIC SURGERY | Facility: CLINIC | Age: 86
End: 2021-11-16

## 2021-11-16 VITALS — HEIGHT: 70 IN | WEIGHT: 203 LBS | BODY MASS INDEX: 29.06 KG/M2 | TEMPERATURE: 98.7 F

## 2021-11-16 DIAGNOSIS — M17.0 BILATERAL PRIMARY OSTEOARTHRITIS OF KNEE: Primary | ICD-10-CM

## 2021-11-16 PROCEDURE — 20610 DRAIN/INJ JOINT/BURSA W/O US: CPT | Performed by: PHYSICIAN ASSISTANT

## 2021-11-16 PROCEDURE — 99213 OFFICE O/P EST LOW 20 MIN: CPT | Performed by: PHYSICIAN ASSISTANT

## 2021-11-16 RX ORDER — LIDOCAINE HYDROCHLORIDE 10 MG/ML
5 INJECTION, SOLUTION EPIDURAL; INFILTRATION; INTRACAUDAL; PERINEURAL
Status: COMPLETED | OUTPATIENT
Start: 2021-11-16 | End: 2021-11-16

## 2021-11-16 RX ORDER — FAMOTIDINE 40 MG/1
TABLET, FILM COATED ORAL
COMMUNITY
Start: 2021-11-14 | End: 2023-02-14 | Stop reason: HOSPADM

## 2021-11-16 RX ADMIN — LIDOCAINE HYDROCHLORIDE 5 ML: 10 INJECTION, SOLUTION EPIDURAL; INFILTRATION; INTRACAUDAL; PERINEURAL at 09:51

## 2021-11-16 RX ADMIN — LIDOCAINE HYDROCHLORIDE 5 ML: 10 INJECTION, SOLUTION EPIDURAL; INFILTRATION; INTRACAUDAL; PERINEURAL at 09:52

## 2021-11-16 NOTE — PROGRESS NOTES
[facility] [place] Orthopaedic Surgery Established Patient Visit          Patient: Angelito Carter  YOB: 1935  Date of Encounter: 11/16/2021  PCP: Heidy Tabares APRN      Subjective     Chief Complaint   Patient presents with   • Right Knee - Follow-up, Pain   • Left Knee - Follow-up, Pain         Order  History of Present Illness:     Angelito Carter is a 86-year-old male with known bilateral knee osteoarthritis several years duration. The patient has been beneficial from previous conservative treatment and steroid injection into bilateral knees with growing recalcitrance.  The patient reports he still minimal treatment with most recent right knee intra-articular steroid injection last visit.  He has undergone conservative treatment options such as bracing, physical therapy etc.  The patient had questions in reference to viscosupplementation which were as a conservative measure to forego total knee arthroplasty.  No new complaints.  Denies paresthesias.    Patient Active Problem List   Diagnosis   • CA of prostate (HCC)   • ASCVD (arteriosclerotic cardiovascular disease)   • History of ischemic cardiomyopathy   • HTN (hypertension)   • HLD (hyperlipidemia)   • HX OF Paroxysmal a-fib   • HX OF Chronic renal insufficiency   • Intra-abdominal free air of unknown etiology     Past Medical History:   Diagnosis Date   • Acute myocardial infarction (HCC)    • Adenocarcinoma (HCC)     prostate gland   • Arthritis    • Heart attack (HCC)    • Hx of radiation therapy    • Hypertension    • Prostate cancer (HCC)      Past Surgical History:   Procedure Laterality Date   • BIOPSY PROSTATE NEEDLE / PUNCH / INCISIONAL     • CORONARY ARTERY BYPASS GRAFT     • OTHER SURGICAL HISTORY      Coronary Artery Triple Arterial Byupass Graft   • PROSTATE SURGERY     • PROSTATECTOMY      Robotic-assisted   • SKIN CANCER EXCISION Left     removed from left ear   • VASECTOMY       Social History     Occupational History   • Not on file    Tobacco Use   • Smoking status: Never Smoker   • Smokeless tobacco: Never Used   Vaping Use   • Vaping Use: Never used   Substance and Sexual Activity   • Alcohol use: Yes     Comment: once in awhile   • Drug use: Never   • Sexual activity: Defer    Angelito Carter  reports that he has never smoked. He has never used smokeless tobacco.. I have educated him on the risk of diseases from using tobacco products such as cancer, COPD and heart disease.          Social History     Social History Narrative   • Not on file     Family History   Problem Relation Age of Onset   • Kidney disease Father    • Heart disease Father    • Heart attack Father    • Kidney disease Mother    • Heart disease Mother      Current Outpatient Medications   Medication Sig Dispense Refill   • aspirin 81 MG tablet Take 1 tablet by mouth Daily. 30 tablet 11   • Cholecalciferol (Vitamin D3) 50 MCG (2000 UT) tablet Take 2,000 Units by mouth Daily.     • cyclobenzaprine (FLEXERIL) 10 MG tablet Take 5 mg by mouth At Night As Needed for Muscle Spasms.     • Diclofenac Sodium (VOLTAREN) 1 % gel gel Apply 2 g topically to the appropriate area as directed 4 (Four) Times a Day As Needed.     • famotidine (PEPCID) 40 MG tablet      • Glucosamine-Chondroitin (OSTEO BI-FLEX REGULAR STRENGTH PO) Take  by mouth.     • magnesium oxide (MAG-OX) 400 MG tablet Take 400 mg by mouth Daily.     • metoprolol succinate XL (TOPROL-XL) 25 MG 24 hr tablet TAKE ONE TABLET BY MOUTH DAILY 90 tablet 10   • multivitamin with minerals tablet tablet Take 1 tablet by mouth Daily.     • rosuvastatin (CRESTOR) 5 MG tablet Take 5 mg by mouth Every Night.     • tamsulosin (FLOMAX) 0.4 MG capsule 24 hr capsule Take 1 capsule by mouth Every Night.     • traMADol (ULTRAM) 50 MG tablet Take 50 mg by mouth 2 (Two) Times a Day As Needed.     • triamcinolone (KENALOG) 0.5 % cream Apply 1 application topically to the appropriate area as directed 4 (Four) Times a Day. Prior to [facility]  "Admission, Patient was on:  Apply to rash     • vitamin B-12 (CYANOCOBALAMIN) 1000 MCG tablet Take 1,000 mcg by mouth Daily.       No current facility-administered medications for this visit.     Allergies   Allergen Reactions   • Adhesive Tape Rash            Review of Systems   Constitutional: Negative.   HENT: Negative.    Eyes: Negative.    Cardiovascular: Negative.    Respiratory: Negative.    Endocrine: Negative.    Hematologic/Lymphatic: Negative.    Skin: Negative.    Musculoskeletal:        Pertinent positives listed in HPI   Gastrointestinal: Negative.    Genitourinary: Negative.    Neurological: Negative.    Psychiatric/Behavioral: Negative.    Allergic/Immunologic: Negative.          Objective      Vitals:    11/16/21 0920   Temp: 98.7 °F (37.1 °C)   Weight: 92.1 kg (203 lb)   Height: 177.8 cm (70\")     Patient's Body mass index is 29.13 kg/m². indicating that he is obese (BMI >30). Obesity-related health conditions include the following: Listed in PMH. Obesity is unchanged. BMI is is above average; BMI management plan is completed. We discussed portion control and increasing exercise..      Physical Exam  Vitals and nursing note reviewed.   Constitutional:       General: He is not in acute distress.     Appearance: Normal appearance. He is not ill-appearing.   HENT:      Head: Normocephalic and atraumatic.      Right Ear: External ear normal.      Left Ear: External ear normal.      Nose: Nose normal.      Mouth/Throat:      Mouth: Mucous membranes are moist.      Pharynx: Oropharynx is clear.   Eyes:      Extraocular Movements: Extraocular movements intact.      Conjunctiva/sclera: Conjunctivae normal.      Pupils: Pupils are equal, round, and reactive to light.   Cardiovascular:      Rate and Rhythm: Normal rate.      Pulses: Normal pulses.   Pulmonary:      Effort: Pulmonary effort is normal.   Abdominal:      General: There is no distension.   Musculoskeletal:      Cervical back: Normal range of " motion. No rigidity.      Comments: Bilateral knee examination today reveals medial joint line tenderness to palpation. Patient is in a scant effusion with no instability on varus valgus stress. Lachman drawer testing negative. Remainder of the neurovascular status grossly intact bilateral lower extremity.   Skin:     General: Skin is warm and dry.      Capillary Refill: Capillary refill takes less than 2 seconds.   Neurological:      General: No focal deficit present.      Mental Status: He is alert and oriented to person, place, and time.      Cranial Nerves: Cranial nerves are intact.   Psychiatric:         Mood and Affect: Mood normal.         Behavior: Behavior normal.           Radiology:      X-rays: 2 views AP/lateral standing bilateral knees reveals tricompartmental moderate to severe osteoarthritis.  Medial joint space collapse bilaterally.  No acute osseous abnormalities.  No acute fractures or dislocations noted        Assessment/Plan      No diagnosis found.    86-year-old male with notable bilateral knee osteoarthritis.  Patient has undergone conservative options including intra-articular steroid injection, Zilretta injection as well as bracing, physical therapy, etc.  He continues to have progression of pain symptoms with going recalcitrance to previous Zilretta injections.  As result the patient would like to proceed with viscosupplementation in an effort to exhaust all conservative treatment options before discussing the knee arthroplasty.  The patient was provided today with an intra-articular Durolane injection locking block into the intra-articular space of the right knee for the left knee respectively.  He was instructed to return back in 4 weeks for further evaluation of efficacy of conservative treatment.      Large Joint Arthrocentesis: L knee  Date/Time: 11/16/2021 9:51 AM  Consent given by: patient  Site marked: site marked  Timeout: Immediately prior to procedure a time out was called to  verify the correct patient, procedure, equipment, support staff and site/side marked as required   Supporting Documentation  Indications: pain and diagnostic evaluation   Procedure Details  Location: knee - L knee  Needle size: 20 G  Approach: anterolateral  Medications administered: 5 mL lidocaine PF 1% 1 %; 60 mg Sodium Hyaluronate 60 MG/3ML  Patient tolerance: patient tolerated the procedure well with no immediate complications    Large Joint Arthrocentesis: R knee  Date/Time: 11/16/2021 9:52 AM  Consent given by: patient  Site marked: site marked  Timeout: Immediately prior to procedure a time out was called to verify the correct patient, procedure, equipment, support staff and site/side marked as required   Supporting Documentation  Indications: pain and diagnostic evaluation   Procedure Details  Location: knee - R knee  Needle size: 20 G  Approach: lateral  Medications administered: 5 mL lidocaine PF 1% 1 %; 60 mg Sodium Hyaluronate 60 MG/3ML  Patient tolerance: patient tolerated the procedure well with no immediate complications                        This document was signed by Washington Holt PA-C November 16, 2021     CC: Heidy Tabares APRN EMR Dragon/Transcription disclaimer:  Part of this note may be completed utilizing the dragon speech recognition software. This electronic transcription/translation of spoken language to printed text may contain grammatical errors, random word insertions, pronoun errors, and incomplete sentences or occasional consequences of the system due to software limitations, ambient noise, and hardware issues.  Any questions or concerns about the content, text, or information contained within the body of this dictation should be directly addressed to the physician for clarification.

## 2021-11-17 ENCOUNTER — PATIENT OUTREACH (OUTPATIENT)
Dept: CASE MANAGEMENT | Facility: OTHER | Age: 86
End: 2021-11-17

## 2021-11-17 NOTE — OUTREACH NOTE
Ambulatory Case Management Note    Patient Outreach    RN-ACM outreach to patient.  Conversation this date with patient's spouse, Silvina.  Patient recently completed Readmission Management with [facility].  Case returned for HRCM.    Spouse reported patient is doing well with no unmet needs/questions/concerns for RN-ACM to address.  Patient follows with independent PCP.  Status of AWV is unknown.  Patient does not utilize MYChart and has POA.     Contact information provided and pt/family encouraged to call with care coordination needs.     General & Health Literacy Assessment    Questions/Answers      Most Recent Value   Assessment Completed With Spouse or Significant Other   Living Arrangement Spouse   Type of Residence Private Residence   Home Care Services No   Bed or Wheelchair Confined No   Difficulty Keeping Appointments No   Confucianist or Spiritual Beliefs that Impact Treatment No   Chronic Pain Yes   Location of Chronic Pain knees        Fifi Couch RN  Ambulatory Case Management    11/17/2021, 14:12 EST

## 2021-12-16 ENCOUNTER — OFFICE VISIT (OUTPATIENT)
Dept: CARDIOLOGY | Facility: CLINIC | Age: 86
End: 2021-12-16

## 2021-12-16 VITALS
OXYGEN SATURATION: 95 % | BODY MASS INDEX: 29.49 KG/M2 | HEART RATE: 73 BPM | DIASTOLIC BLOOD PRESSURE: 72 MMHG | SYSTOLIC BLOOD PRESSURE: 133 MMHG | WEIGHT: 206 LBS | HEIGHT: 70 IN

## 2021-12-16 DIAGNOSIS — I25.10 ASCVD (ARTERIOSCLEROTIC CARDIOVASCULAR DISEASE): Primary | Chronic | ICD-10-CM

## 2021-12-16 DIAGNOSIS — E78.5 DYSLIPIDEMIA, GOAL LDL BELOW 70: ICD-10-CM

## 2021-12-16 DIAGNOSIS — I10 PRIMARY HYPERTENSION: Chronic | ICD-10-CM

## 2021-12-16 PROCEDURE — 99213 OFFICE O/P EST LOW 20 MIN: CPT | Performed by: NURSE PRACTITIONER

## 2021-12-16 RX ORDER — LISINOPRIL 10 MG/1
1 TABLET ORAL DAILY
COMMUNITY
Start: 2021-11-24

## 2021-12-16 NOTE — PROGRESS NOTES
"Chief Complaint  Coronary Artery Disease (6mo follow up, echo results)    Subjective          Angelito Carter presents to [facility] CARDIOLOGY for follow-up.    History of Present Illness    Mr. Carter was last seen in clinic on 6/16/2021.  At that visit he did admit to some dyspnea on exertion and lower extremity swelling.  An echocardiogram was ordered.  The echo revealed normal LV systolic function with LVEF of 51 to 55%, grade 1 diastolic dysfunction and no significant valvular abnormalities.    At today's visit Mr. Carter states that he has been doing well.  He reports that he has no complaint of chest pain or palpitations.  He does not complain of any shortness of breath or dyspnea on exertion.  He denies any lower extremity swelling other than his knees swelling.  He does tell me that he plans knee replacement surgery sometime in May and June.  He will need perioperative risk assessment at that time.    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  He denies any chest pain when he is out working on his farm.    Objective     Vital Signs:   /72 (BP Location: Right arm, Patient Position: Sitting, Cuff Size: Adult)   Pulse 73   Ht 177.8 cm (70\")   Wt 93.4 kg (206 lb)   SpO2 95%   BMI 29.56 kg/m²       Physical Exam  Vitals reviewed.   Constitutional:       Appearance: Normal appearance. He is well-developed.   Cardiovascular:      Rate and Rhythm: Normal rate and regular rhythm.      Heart sounds: No murmur heard.  No friction rub. No gallop.    Pulmonary:      Effort: Pulmonary effort is normal. No respiratory distress.      Breath sounds: Normal breath sounds. No wheezing or rales.   Musculoskeletal:      Right lower leg: No edema.      Left lower leg: No edema.   Skin:     General: Skin is warm and dry.   Neurological:      Mental Status: He is alert and oriented to person, place, and time.   Psychiatric:         Mood and Affect: Mood normal.       "   Behavior: Behavior normal.          Result Review :       Data reviewed: Cardiology studies Transthoracic echocardiogram       6/29/2021 transthoracic echocardiogram    Interpretation Summary    · Left ventricular ejection fraction appears to be 51 - 55%. Left ventricular systolic function is normal.  · Left ventricular diastolic function is consistent with (grade I) impaired relaxation.  · No significant functional valvular abnormalities noted  · There is no evidence of pericardial effusion      Current Outpatient Medications   Medication Sig Dispense Refill   • aspirin 81 MG tablet Take 1 tablet by mouth Daily. 30 tablet 11   • Cholecalciferol (Vitamin D3) 50 MCG (2000 UT) tablet Take 2,000 Units by mouth Daily.     • cyclobenzaprine (FLEXERIL) 10 MG tablet Take 5 mg by mouth At Night As Needed for Muscle Spasms.     • Diclofenac Sodium (VOLTAREN) 1 % gel gel Apply 2 g topically to the appropriate area as directed 4 (Four) Times a Day As Needed.     • famotidine (PEPCID) 40 MG tablet      • Glucosamine-Chondroitin (OSTEO BI-FLEX REGULAR STRENGTH PO) Take  by mouth.     • lisinopril (PRINIVIL,ZESTRIL) 10 MG tablet Take 1 tablet by mouth Daily.     • magnesium oxide (MAG-OX) 400 MG tablet Take 400 mg by mouth Daily.     • metoprolol succinate XL (TOPROL-XL) 25 MG 24 hr tablet TAKE ONE TABLET BY MOUTH DAILY 90 tablet 10   • multivitamin with minerals tablet tablet Take 1 tablet by mouth Daily.     • rosuvastatin (CRESTOR) 5 MG tablet Take 5 mg by mouth Every Night.     • tamsulosin (FLOMAX) 0.4 MG capsule 24 hr capsule Take 1 capsule by mouth Every Night.     • traMADol (ULTRAM) 50 MG tablet Take 50 mg by mouth 2 (Two) Times a Day As Needed.     • triamcinolone (KENALOG) 0.5 % cream Apply 1 application topically to the appropriate area as directed 4 (Four) Times a Day. Prior to [facility] Admission, Patient was on:  Apply to rash     • vitamin B-12 (CYANOCOBALAMIN) 1000 MCG tablet Take 1,000 mcg by mouth Daily.        No current facility-administered medications for this visit.            Assessment and Plan    Problem List Items Addressed This Visit        Cardiac and Vasculature    ASCVD (arteriosclerotic cardiovascular disease) - Primary (Chronic)    Overview     · 3/20/2015 [facility] for STEMI: Distal left main 50% stenosed; POBA to proximal % stenosed; proximal left circumflex 90% stenosed, OMB 50% to 70% stenosed; RCA, proximal and mid 70% stenosed.  Patient referred for bypass.  · 3/21/2015 three-vessel bypass: LIMA to LAD, SVG to OMB, SVG to posterior descending artery         HTN (hypertension) (Chronic)    Relevant Medications    lisinopril (PRINIVIL,ZESTRIL) 10 MG tablet    Dyslipidemia, goal LDL below 70 (Chronic)    Overview     · 3/23/2021 total cholesterol 119, triglycerides 136, HDL 39, and LDL 58                   Follow Up     Medications were reviewed with the patient.    ASCVD is stable.  Patient is to continue aspirin, lisinopril, metoprolol, and rosuvastatin.    Hypertension is stable.    With regard to dyslipidemia, his most recent cholesterol panel shows good LDL control.  Continue rosuvastatin.    I did tell Mr. Carter that his orthopedic surgeon could contact our office for perioperative risk assessment.  I did tell him that we may have to see him back in clinic for an EKG.    Return in about 6 months (around 6/16/2022).    Patient was given instructions and counseling regarding his condition or for health maintenance advice. Please see specific information pulled into the AVS if appropriate.

## 2022-03-04 ENCOUNTER — HOSPITAL ENCOUNTER (EMERGENCY)
Facility: HOSPITAL | Age: 87
Discharge: HOME | End: 2022-03-04
Attending: EMERGENCY MEDICINE | Admitting: EMERGENCY MEDICINE

## 2022-03-04 VITALS
WEIGHT: 200 LBS | HEIGHT: 70 IN | OXYGEN SATURATION: 95 % | HEART RATE: 76 BPM | TEMPERATURE: 98.9 F | BODY MASS INDEX: 28.63 KG/M2 | RESPIRATION RATE: 20 BRPM | SYSTOLIC BLOOD PRESSURE: 168 MMHG | DIASTOLIC BLOOD PRESSURE: 69 MMHG

## 2022-03-04 DIAGNOSIS — H16.133 PHOTOKERATITIS OF BOTH EYES: Primary | ICD-10-CM

## 2022-03-04 PROCEDURE — 99283 EMERGENCY DEPT VISIT LOW MDM: CPT

## 2022-03-04 RX ORDER — TETRACAINE HYDROCHLORIDE 5 MG/ML
2 SOLUTION OPHTHALMIC ONCE
Status: COMPLETED | OUTPATIENT
Start: 2022-03-04 | End: 2022-03-04

## 2022-03-04 RX ADMIN — FLUORESCEIN SODIUM 1 STRIP: 1 STRIP OPHTHALMIC at 04:27

## 2022-03-04 RX ADMIN — TETRACAINE HYDROCHLORIDE 2 DROP: 5 SOLUTION OPHTHALMIC at 04:26
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
## 2022-04-14 RX ORDER — METOPROLOL SUCCINATE 25 MG/1
25 TABLET, EXTENDED RELEASE ORAL DAILY
Qty: 90 TABLET | Refills: 3 | Status: SHIPPED | OUTPATIENT
Start: 2022-04-14

## 2022-08-26 DIAGNOSIS — M25.552 BILATERAL HIP PAIN: Primary | ICD-10-CM

## 2022-08-26 DIAGNOSIS — M25.551 BILATERAL HIP PAIN: Primary | ICD-10-CM

## 2022-08-31 ENCOUNTER — OFFICE VISIT (OUTPATIENT)
Dept: ORTHOPEDIC SURGERY | Facility: CLINIC | Age: 87
End: 2022-08-31

## 2022-08-31 ENCOUNTER — HOSPITAL ENCOUNTER (OUTPATIENT)
Dept: GENERAL RADIOLOGY | Facility: HOSPITAL | Age: 87
Discharge: HOME | End: 2022-08-31
Admitting: PHYSICIAN ASSISTANT

## 2022-08-31 VITALS — HEIGHT: 70 IN | BODY MASS INDEX: 28.63 KG/M2 | WEIGHT: 200 LBS

## 2022-08-31 DIAGNOSIS — M54.50 LUMBAR BACK PAIN: Primary | ICD-10-CM

## 2022-08-31 DIAGNOSIS — M25.552 BILATERAL HIP PAIN: ICD-10-CM

## 2022-08-31 DIAGNOSIS — M25.551 BILATERAL HIP PAIN: ICD-10-CM

## 2022-08-31 PROCEDURE — 73521 X-RAY EXAM HIPS BI 2 VIEWS: CPT

## 2022-08-31 PROCEDURE — 73521 X-RAY EXAM HIPS BI 2 VIEWS: CPT | Performed by: RADIOLOGY

## 2022-08-31 PROCEDURE — 99213 OFFICE O/P EST LOW 20 MIN: CPT | Performed by: PHYSICIAN ASSISTANT

## 2022-08-31 RX ORDER — TRAZODONE HYDROCHLORIDE 100 MG/1
TABLET ORAL
COMMUNITY
Start: 2022-07-29

## 2022-08-31 RX ORDER — METHYLPREDNISOLONE 4 MG/1
TABLET ORAL
Qty: 21 TABLET | Refills: 0 | Status: ON HOLD | OUTPATIENT
Start: 2022-08-31 | End: 2023-02-14

## 2022-08-31 NOTE — PROGRESS NOTES
[facility] Orthopaedic Surgery Established Patient Visit          Patient: Angelito Carter  YOB: 1935  Date of Encounter: 8/31/2022  PCP: Heidy Tabares APRN      Subjective     Chief Complaint   Patient presents with   • Left Hip - Follow-up, Pain         Order  History of Present Illness:     Angelito Carter is a 87-year-old male seen today for new complaints of left hip and lower back pain this is been ongoing for the last several days.  He reports pain to the posterior lateral aspect of his hip progressing into his lumbar spine.  He reports occasional pain radiating down to the knee and intermittently past the knee into his left foot.  He has difficulty upon repetitive activities mostly prolonged sitting or standing.  He reports grinding popping sensation in the posterior back and hip.  Denies any specific injury.  He is undergone no conservative treatment with this.  Patient does have a longstanding history of lumbar back pain and presents for review of previous radiographs and new radiographs left hip.         Patient Active Problem List   Diagnosis   • CA of prostate (HCC)   • ASCVD (arteriosclerotic cardiovascular disease)   • History of ischemic cardiomyopathy   • HTN (hypertension)   • Dyslipidemia, goal LDL below 70   • HX OF Paroxysmal a-fib   • HX OF Chronic renal insufficiency   • Intra-abdominal free air of unknown etiology     Past Medical History:   Diagnosis Date   • Acute myocardial infarction (HCC)    • Adenocarcinoma (HCC)     prostate gland   • Arthritis    • Heart attack (HCC)    • Hx of radiation therapy    • Hypertension    • Prostate cancer (HCC)      Past Surgical History:   Procedure Laterality Date   • BIOPSY PROSTATE NEEDLE / PUNCH / INCISIONAL     • CORONARY ARTERY BYPASS GRAFT     • KNEE SURGERY Right    • OTHER SURGICAL HISTORY      Coronary Artery Triple Arterial Byupass Graft   • PROSTATE SURGERY     • PROSTATECTOMY      Robotic-assisted   • SKIN CANCER EXCISION Left      removed from left ear   • VASECTOMY       Social History     Occupational History   • Not on file   Tobacco Use   • Smoking status: Never Smoker   • Smokeless tobacco: Never Used   Vaping Use   • Vaping Use: Never used   Substance and Sexual Activity   • Alcohol use: Yes     Comment: once in awhile   • Drug use: Never   • Sexual activity: Defer    Angelito Carter  reports that he has never smoked. He has never used smokeless tobacco.. I have educated him on the risk of diseases from using tobacco products such as cancer, COPD and heart disease.          Social History     Social History Narrative   • Not on file     Family History   Problem Relation Age of Onset   • Kidney disease Father    • Heart disease Father    • Heart attack Father    • Kidney disease Mother    • Heart disease Mother      Current Outpatient Medications   Medication Sig Dispense Refill   • aspirin 81 MG tablet Take 1 tablet by mouth Daily. 30 tablet 11   • Cholecalciferol (Vitamin D3) 50 MCG (2000 UT) tablet Take 2,000 Units by mouth Daily.     • cyclobenzaprine (FLEXERIL) 10 MG tablet Take 5 mg by mouth At Night As Needed for Muscle Spasms.     • Diclofenac Sodium (VOLTAREN) 1 % gel gel Apply 2 g topically to the appropriate area as directed 4 (Four) Times a Day As Needed.     • famotidine (PEPCID) 40 MG tablet      • Glucosamine-Chondroitin (OSTEO BI-FLEX REGULAR STRENGTH PO) Take  by mouth.     • lisinopril (PRINIVIL,ZESTRIL) 10 MG tablet Take 1 tablet by mouth Daily.     • magnesium oxide (MAG-OX) 400 MG tablet Take 400 mg by mouth Daily.     • metoprolol succinate XL (TOPROL-XL) 25 MG 24 hr tablet Take 1 tablet by mouth Daily. 90 tablet 3   • multivitamin with minerals tablet tablet Take 1 tablet by mouth Daily.     • rosuvastatin (CRESTOR) 5 MG tablet Take 5 mg by mouth Every Night.     • tamsulosin (FLOMAX) 0.4 MG capsule 24 hr capsule Take 1 capsule by mouth Every Night.     • traMADol (ULTRAM) 50 MG tablet Take 50 mg by mouth 2 (Two)  "Times a Day As Needed.     • traZODone (DESYREL) 100 MG tablet      • triamcinolone (KENALOG) 0.5 % cream Apply 1 application topically to the appropriate area as directed 4 (Four) Times a Day. Prior to [facility] Admission, Patient was on:  Apply to rash     • vitamin B-12 (CYANOCOBALAMIN) 1000 MCG tablet Take 1,000 mcg by mouth Daily.     • methylPREDNISolone (MEDROL) 4 MG dose pack Use as directed by package instructions 21 tablet 0     No current facility-administered medications for this visit.     Allergies   Allergen Reactions   • Adhesive Tape Rash            Review of Systems   Constitutional: Negative.   HENT: Negative.    Eyes: Negative.    Cardiovascular: Negative.    Respiratory: Negative.    Endocrine: Negative.    Hematologic/Lymphatic: Negative.    Skin: Negative.    Musculoskeletal:        Pertinent positives listed in HPI   Gastrointestinal: Negative.    Genitourinary: Negative.    Neurological: Negative.    Psychiatric/Behavioral: Negative.    Allergic/Immunologic: Negative.          Objective      Vitals:    08/31/22 1029   Weight: 90.7 kg (200 lb)   Height: 177.8 cm (70\")     Patient's Body mass index is 28.7 kg/m². indicating that he is obese (BMI >30). Obesity-related health conditions include the following: Listed in PMH. Obesity is unchanged. BMI is is above average; BMI management plan is completed. We discussed portion control and increasing exercise..      Physical Exam  Vitals and nursing note reviewed.   Constitutional:       General: He is not in acute distress.     Appearance: Normal appearance. He is not ill-appearing.   HENT:      Head: Normocephalic and atraumatic.      Right Ear: External ear normal.      Left Ear: External ear normal.      Nose: Nose normal.      Mouth/Throat:      Mouth: Mucous membranes are moist.      Pharynx: Oropharynx is clear.   Eyes:      Extraocular Movements: Extraocular movements intact.      Conjunctiva/sclera: Conjunctivae normal.      Pupils: Pupils " are equal, round, and reactive to light.   Cardiovascular:      Rate and Rhythm: Normal rate.      Pulses: Normal pulses.   Pulmonary:      Effort: Pulmonary effort is normal.   Abdominal:      General: There is no distension.   Musculoskeletal:      Cervical back: Normal range of motion. No rigidity.      Lumbar back: Swelling, spasms and tenderness present. No edema or signs of trauma. Decreased range of motion. Positive left straight leg raise test. No scoliosis.      Left hip: No deformity, tenderness, bony tenderness or crepitus. Normal range of motion. Normal strength.   Skin:     General: Skin is warm and dry.      Capillary Refill: Capillary refill takes less than 2 seconds.   Neurological:      General: No focal deficit present.      Mental Status: He is alert and oriented to person, place, and time.      Cranial Nerves: Cranial nerves are intact.   Psychiatric:         Mood and Affect: Mood normal.         Behavior: Behavior normal.           Radiology:      XR Hips Bilateral With or Without Pelvis 2 View    Result Date: 8/31/2022    Mild bilateral hip osteoarthritic change.  This report was finalized on 8/31/2022 10:48 AM by Dr. Tony Aguilar MD.      5 VIEWS LUMBAR SPINE WITH OBLIQUES     REASON FOR EXAM- Back pain.     COMPARISON- Today's exam is compared with the previous study from 2012.     FINDINGS- The lumbar vertebral bodies were normal in height and  alignment and stable in comparing with the earlier exam. No acute  compression fractures are demonstrated. There are degenerative disc  changes involving all the lumbar disc spaces. This is most prominent at  L5-S1 where there is almost complete loss of disc height. In the oblique  views the facet joints were normally aligned.     IMPRESSION- Degenerative disc changes throughout the lumbar spine. No  compression fractures or bony destructive lesions were demonstrated.             Reading Radiologist- KATHY Garcia Radiologist-  KATHY CUI       Released Date Time- 10/19/15 1429       - IRWIN   ------------------------------------------------------------------------------  Read By: KATHY CUI  Released By: KATHY CUI      Assessment/Plan        ICD-10-CM ICD-9-CM   1. Lumbar back pain  M54.50 724.2     87-year-old male with notable lower lumbar degenerative changes exacerbation with left-sided lower extremity pain symptoms.  Further discussion was had with patient and left hip does reveal mild osteoarthritis however majority of symptoms and exam findings are more consistent with exacerbation of longstanding lower lumbar findings.  The patient was provided today with a Medrol Dosepak to take as directed.  He return back in 3 weeks for further evaluation of efficacy of the conservative treatment.  If no notable improvement we discussed possibility of further conservative treatment with formal outpatient therapy and discussion of repeating lower lumbar series radiographs                    This document was signed by Washington Holt PA-C August 31, 2022     CC: Heidy Tabares APRN EMR Dragon/Transcription disclaimer:  Part of this note may be completed utilizing the dragon speech recognition software. This electronic transcription/translation of spoken language to printed text may contain grammatical errors, random word insertions, pronoun errors, and incomplete sentences or occasional consequences of the system due to software limitations, ambient noise, and hardware issues.  Any questions or concerns about the content, text, or information contained within the body of this dictation should be directly addressed to the physician for clarification.

## 2022-09-14 ENCOUNTER — OFFICE VISIT (OUTPATIENT)
Dept: ORTHOPEDIC SURGERY | Facility: CLINIC | Age: 87
End: 2022-09-14

## 2022-09-14 VITALS — WEIGHT: 200 LBS | HEIGHT: 70 IN | BODY MASS INDEX: 28.63 KG/M2

## 2022-09-14 DIAGNOSIS — M54.50 LUMBAR BACK PAIN: Primary | ICD-10-CM

## 2022-09-14 DIAGNOSIS — M25.552 BILATERAL HIP PAIN: ICD-10-CM

## 2022-09-14 DIAGNOSIS — M25.551 BILATERAL HIP PAIN: ICD-10-CM

## 2022-09-14 PROCEDURE — 99213 OFFICE O/P EST LOW 20 MIN: CPT | Performed by: PHYSICIAN ASSISTANT

## 2022-09-14 RX ORDER — NAPROXEN 500 MG/1
500 TABLET ORAL 2 TIMES DAILY WITH MEALS
Qty: 60 TABLET | Refills: 2 | Status: SHIPPED | OUTPATIENT
Start: 2022-09-14 | End: 2022-12-13

## 2022-09-14 NOTE — PROGRESS NOTES
[facility]edic Surgery Established Patient Visit          Patient: Angelito Carter  YOB: 1935  Date of Encounter: 9/14/2022  PCP: Heidy Tabares APRN      Subjective     Chief Complaint   Patient presents with   • Lumbar Spine - Follow-up         Order  History of Present Illness:     Angelito Carter is a 87-year-old male seen today for follow up left hip and lumbar spine pain with radiculopathy. Once again, the pain was fairly abrupt over the course of several days.  He had initial complaints of posterior lateral aspect pain radiating from his lower back and left hip into his left lower extremity.  Patient difficulty upon repetitive activities or upon prolonged sitting or standing.  The patient had radiographic evidence of mild osteoarthritis left hip with majority of his degenerative changes were present in lumbar spine.  Patient was started on a Medrol Dosepak to take as directed.  He reports significant improvement of pain symptoms with only mild symptoms at the end of a long/hard workday.  This is typically alleviated the following day.  He reports stiffness into the lower lumbar spine and left hip.  No new complaints.  Denies any paresthesias currently.       Patient Active Problem List   Diagnosis   • CA of prostate (HCC)   • ASCVD (arteriosclerotic cardiovascular disease)   • History of ischemic cardiomyopathy   • HTN (hypertension)   • Dyslipidemia, goal LDL below 70   • HX OF Paroxysmal a-fib   • HX OF Chronic renal insufficiency   • Intra-abdominal free air of unknown etiology     Past Medical History:   Diagnosis Date   • Acute myocardial infarction (HCC)    • Adenocarcinoma (HCC)     prostate gland   • Arthritis    • Heart attack (HCC)    • Hx of radiation therapy    • Hypertension    • Prostate cancer (HCC)      Past Surgical History:   Procedure Laterality Date   • BIOPSY PROSTATE NEEDLE / PUNCH / INCISIONAL     • CORONARY ARTERY BYPASS GRAFT     • KNEE SURGERY Right    • OTHER  SURGICAL HISTORY      Coronary Artery Triple Arterial Byupass Graft   • PROSTATE SURGERY     • PROSTATECTOMY      Robotic-assisted   • SKIN CANCER EXCISION Left     removed from left ear   • VASECTOMY       Social History     Occupational History   • Not on file   Tobacco Use   • Smoking status: Never Smoker   • Smokeless tobacco: Never Used   Vaping Use   • Vaping Use: Never used   Substance and Sexual Activity   • Alcohol use: Yes     Comment: once in awhile   • Drug use: Never   • Sexual activity: Defer    Angelito Carter  reports that he has never smoked. He has never used smokeless tobacco.. I have educated him on the risk of diseases from using tobacco products such as cancer, COPD and heart disease.          Social History     Social History Narrative   • Not on file     Family History   Problem Relation Age of Onset   • Kidney disease Father    • Heart disease Father    • Heart attack Father    • Kidney disease Mother    • Heart disease Mother      Current Outpatient Medications   Medication Sig Dispense Refill   • aspirin 81 MG tablet Take 1 tablet by mouth Daily. 30 tablet 11   • Cholecalciferol (Vitamin D3) 50 MCG (2000 UT) tablet Take 2,000 Units by mouth Daily.     • cyclobenzaprine (FLEXERIL) 10 MG tablet Take 5 mg by mouth At Night As Needed for Muscle Spasms.     • Diclofenac Sodium (VOLTAREN) 1 % gel gel Apply 2 g topically to the appropriate area as directed 4 (Four) Times a Day As Needed.     • famotidine (PEPCID) 40 MG tablet      • Glucosamine-Chondroitin (OSTEO BI-FLEX REGULAR STRENGTH PO) Take  by mouth.     • lisinopril (PRINIVIL,ZESTRIL) 10 MG tablet Take 1 tablet by mouth Daily.     • magnesium oxide (MAG-OX) 400 MG tablet Take 400 mg by mouth Daily.     • methylPREDNISolone (MEDROL) 4 MG dose pack Use as directed by package instructions 21 tablet 0   • metoprolol succinate XL (TOPROL-XL) 25 MG 24 hr tablet Take 1 tablet by mouth Daily. 90 tablet 3   • multivitamin with minerals tablet tablet  "Take 1 tablet by mouth Daily.     • rosuvastatin (CRESTOR) 5 MG tablet Take 5 mg by mouth Every Night.     • tamsulosin (FLOMAX) 0.4 MG capsule 24 hr capsule Take 1 capsule by mouth Every Night.     • traMADol (ULTRAM) 50 MG tablet Take 50 mg by mouth 2 (Two) Times a Day As Needed.     • traZODone (DESYREL) 100 MG tablet      • triamcinolone (KENALOG) 0.5 % cream Apply 1 application topically to the appropriate area as directed 4 (Four) Times a Day. Prior to [facility] Admission, Patient was on:  Apply to rash     • vitamin B-12 (CYANOCOBALAMIN) 1000 MCG tablet Take 1,000 mcg by mouth Daily.     • naproxen (NAPROSYN) 500 MG tablet Take 1 tablet by mouth 2 (Two) Times a Day With Meals. 60 tablet 2     No current facility-administered medications for this visit.     Allergies   Allergen Reactions   • Adhesive Tape Rash            Review of Systems   Constitutional: Negative.   HENT: Negative.    Eyes: Negative.    Cardiovascular: Negative.    Respiratory: Negative.    Endocrine: Negative.    Hematologic/Lymphatic: Negative.    Skin: Negative.    Musculoskeletal:        Pertinent positives listed in HPI   Gastrointestinal: Negative.    Genitourinary: Negative.    Neurological: Negative.    Psychiatric/Behavioral: Negative.    Allergic/Immunologic: Negative.          Objective      Vitals:    09/14/22 1030   Weight: 90.7 kg (200 lb)   Height: 177.8 cm (70\")     Patient's Body mass index is 28.7 kg/m². indicating that he is obese (BMI >30). Obesity-related health conditions include the following: Listed in PMH. Obesity is unchanged. BMI is is above average; BMI management plan is completed. We discussed portion control and increasing exercise..      Physical Exam  Vitals and nursing note reviewed.   Constitutional:       General: He is not in acute distress.     Appearance: Normal appearance. He is not ill-appearing.   HENT:      Head: Normocephalic and atraumatic.      Right Ear: External ear normal.      Left Ear: " External ear normal.      Nose: Nose normal.      Mouth/Throat:      Mouth: Mucous membranes are moist.      Pharynx: Oropharynx is clear.   Eyes:      Extraocular Movements: Extraocular movements intact.      Conjunctiva/sclera: Conjunctivae normal.      Pupils: Pupils are equal, round, and reactive to light.   Cardiovascular:      Rate and Rhythm: Normal rate.      Pulses: Normal pulses.   Pulmonary:      Effort: Pulmonary effort is normal.   Abdominal:      General: There is no distension.   Musculoskeletal:      Cervical back: Normal range of motion. No rigidity.      Lumbar back: No swelling, edema, signs of trauma, spasms or tenderness. Decreased range of motion. Negative left straight leg raise test. No scoliosis.      Left hip: No deformity, tenderness, bony tenderness or crepitus. Normal range of motion. Normal strength.   Skin:     General: Skin is warm and dry.      Capillary Refill: Capillary refill takes less than 2 seconds.   Neurological:      General: No focal deficit present.      Mental Status: He is alert and oriented to person, place, and time.      Cranial Nerves: Cranial nerves are intact.   Psychiatric:         Mood and Affect: Mood normal.         Behavior: Behavior normal.           Radiology:      XR Hips Bilateral With or Without Pelvis 2 View    Result Date: 8/31/2022    Mild bilateral hip osteoarthritic change.  This report was finalized on 8/31/2022 10:48 AM by Dr. Tony Aguilar MD.      5 VIEWS LUMBAR SPINE WITH OBLIQUES     REASON FOR EXAM- Back pain.     COMPARISON- Today's exam is compared with the previous study from 2012.     FINDINGS- The lumbar vertebral bodies were normal in height and  alignment and stable in comparing with the earlier exam. No acute  compression fractures are demonstrated. There are degenerative disc  changes involving all the lumbar disc spaces. This is most prominent at  L5-S1 where there is almost complete loss of disc height. In the oblique  views the  facet joints were normally aligned.     IMPRESSION- Degenerative disc changes throughout the lumbar spine. No  compression fractures or bony destructive lesions were demonstrated.             Reading Radiologist- KATHY CUI       Releasing Radiologist- KATHY CUI       Released Date Time- 10/19/15 1429       [facility] GAYLE   ------------------------------------------------------------------------------  Read By: KATHY CUI  Released By: KATHY CUI      Assessment/Plan        ICD-10-CM ICD-9-CM   1. Lumbar back pain  M54.50 724.2   2. Bilateral hip pain  M25.551 719.45    M25.552         87-year-old male with notable lower lumbar degenerative changes and recent exacerbation with left-sided lower extremity pain/symptoms.  Patient reports that majority of his pain symptoms have been alleviated with the Medrol Dosepak.  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  As result of this the patient was provided with a prescription for Naprosyn 500 mg 1 p.o. twice daily with food.  He is a close attention to this and will return back in 8 weeks for further evaluation.  No direct surgical indication.  We did discuss the possibility of formal outpatient physical therapy or repeating lower lumbar radiographic series if no response or return of pain/symptoms.                   This document was signed by Washington Holt PA-C September 14, 2022     CC: Heidy Tabares APRN EMR Dragon/Transcription disclaimer:  Part of this note may be completed utilizing the dragon speech recognition software. This electronic transcription/translation of spoken language to printed text may contain grammatical errors, random word insertions, pronoun errors, and incomplete sentences or occasional consequences of the system due to software limitations, ambient noise, and hardware issues.  Any questions or concerns about the  content, text, or information contained within the body of this dictation should be directly addressed to the physician for clarification.

## 2022-10-12 ENCOUNTER — HOSPITAL ENCOUNTER (EMERGENCY)
Facility: HOSPITAL | Age: 87
Discharge: HOME | End: 2022-10-12
Attending: EMERGENCY MEDICINE | Admitting: EMERGENCY MEDICINE

## 2022-10-12 ENCOUNTER — APPOINTMENT (OUTPATIENT)
Dept: GENERAL RADIOLOGY | Facility: HOSPITAL | Age: 87
End: 2022-10-12

## 2022-10-12 VITALS
OXYGEN SATURATION: 97 % | TEMPERATURE: 97.3 F | RESPIRATION RATE: 18 BRPM | SYSTOLIC BLOOD PRESSURE: 174 MMHG | HEIGHT: 70 IN | WEIGHT: 200 LBS | DIASTOLIC BLOOD PRESSURE: 83 MMHG | BODY MASS INDEX: 28.63 KG/M2 | HEART RATE: 65 BPM

## 2022-10-12 DIAGNOSIS — J06.9 UPPER RESPIRATORY TRACT INFECTION, UNSPECIFIED TYPE: Primary | ICD-10-CM

## 2022-10-12 LAB
ALBUMIN SERPL-MCNC: 4.2 G/DL (ref 3.5–5.2)
ALBUMIN/GLOB SERPL: 1.4 G/DL
ALP SERPL-CCNC: 77 U/L (ref 39–117)
ALT SERPL W P-5'-P-CCNC: 16 U/L (ref 1–41)
ANION GAP SERPL CALCULATED.3IONS-SCNC: 13 MMOL/L (ref 5–15)
AST SERPL-CCNC: 20 U/L (ref 1–40)
BASOPHILS # BLD AUTO: 0.04 10*3/MM3 (ref 0–0.2)
BASOPHILS NFR BLD AUTO: 0.5 % (ref 0–1.5)
BILIRUB SERPL-MCNC: 0.2 MG/DL (ref 0–1.2)
BUN SERPL-MCNC: 22 MG/DL (ref 8–23)
BUN/CREAT SERPL: 16.9 (ref 7–25)
CALCIUM SPEC-SCNC: 9.4 MG/DL (ref 8.6–10.5)
CHLORIDE SERPL-SCNC: 107 MMOL/L (ref 98–107)
CO2 SERPL-SCNC: 24 MMOL/L (ref 22–29)
CREAT SERPL-MCNC: 1.3 MG/DL (ref 0.76–1.27)
DEPRECATED RDW RBC AUTO: 46.2 FL (ref 37–54)
EGFRCR SERPLBLD CKD-EPI 2021: 53.5 ML/MIN/1.73
EOSINOPHIL # BLD AUTO: 0.29 10*3/MM3 (ref 0–0.4)
EOSINOPHIL NFR BLD AUTO: 3.4 % (ref 0.3–6.2)
ERYTHROCYTE [DISTWIDTH] IN BLOOD BY AUTOMATED COUNT: 13.5 % (ref 12.3–15.4)
GLOBULIN UR ELPH-MCNC: 2.9 GM/DL
GLUCOSE SERPL-MCNC: 192 MG/DL (ref 65–99)
HCT VFR BLD AUTO: 40 % (ref 37.5–51)
HGB BLD-MCNC: 13.3 G/DL (ref 13–17.7)
HOLD SPECIMEN: NORMAL
HOLD SPECIMEN: NORMAL
IMM GRANULOCYTES # BLD AUTO: 0.07 10*3/MM3 (ref 0–0.05)
IMM GRANULOCYTES NFR BLD AUTO: 0.8 % (ref 0–0.5)
LYMPHOCYTES # BLD AUTO: 1.59 10*3/MM3 (ref 0.7–3.1)
LYMPHOCYTES NFR BLD AUTO: 18.5 % (ref 19.6–45.3)
MCH RBC QN AUTO: 31.4 PG (ref 26.6–33)
MCHC RBC AUTO-ENTMCNC: 33.3 G/DL (ref 31.5–35.7)
MCV RBC AUTO: 94.6 FL (ref 79–97)
MONOCYTES # BLD AUTO: 0.63 10*3/MM3 (ref 0.1–0.9)
MONOCYTES NFR BLD AUTO: 7.3 % (ref 5–12)
NEUTROPHILS NFR BLD AUTO: 5.97 10*3/MM3 (ref 1.7–7)
NEUTROPHILS NFR BLD AUTO: 69.5 % (ref 42.7–76)
NRBC BLD AUTO-RTO: 0 /100 WBC (ref 0–0.2)
PLATELET # BLD AUTO: 208 10*3/MM3 (ref 140–450)
PMV BLD AUTO: 9.2 FL (ref 6–12)
POTASSIUM SERPL-SCNC: 4.4 MMOL/L (ref 3.5–5.2)
PROT SERPL-MCNC: 7.1 G/DL (ref 6–8.5)
QT INTERVAL: 380 MS
QTC INTERVAL: 410 MS
RBC # BLD AUTO: 4.23 10*6/MM3 (ref 4.14–5.8)
SODIUM SERPL-SCNC: 144 MMOL/L (ref 136–145)
TROPONIN T SERPL-MCNC: <0.01 NG/ML (ref 0–0.03)
TROPONIN T SERPL-MCNC: <0.01 NG/ML (ref 0–0.03)
WBC NRBC COR # BLD: 8.59 10*3/MM3 (ref 3.4–10.8)
WHOLE BLOOD HOLD COAG: NORMAL
WHOLE BLOOD HOLD SPECIMEN: NORMAL

## 2022-10-12 PROCEDURE — 71045 X-RAY EXAM CHEST 1 VIEW: CPT

## 2022-10-12 PROCEDURE — 80053 COMPREHEN METABOLIC PANEL: CPT | Performed by: EMERGENCY MEDICINE

## 2022-10-12 PROCEDURE — 93010 ELECTROCARDIOGRAM REPORT: CPT | Performed by: INTERNAL MEDICINE

## 2022-10-12 PROCEDURE — 71045 X-RAY EXAM CHEST 1 VIEW: CPT | Performed by: RADIOLOGY

## 2022-10-12 PROCEDURE — 36415 COLL VENOUS BLD VENIPUNCTURE: CPT

## 2022-10-12 PROCEDURE — 96365 THER/PROPH/DIAG IV INF INIT: CPT

## 2022-10-12 PROCEDURE — 93005 ELECTROCARDIOGRAM TRACING: CPT | Performed by: EMERGENCY MEDICINE

## 2022-10-12 PROCEDURE — 25010000002 METHYLPREDNISOLONE PER 125 MG: Performed by: EMERGENCY MEDICINE

## 2022-10-12 PROCEDURE — 85025 COMPLETE CBC W/AUTO DIFF WBC: CPT | Performed by: EMERGENCY MEDICINE

## 2022-10-12 PROCEDURE — 94640 AIRWAY INHALATION TREATMENT: CPT

## 2022-10-12 PROCEDURE — 96375 TX/PRO/DX INJ NEW DRUG ADDON: CPT

## 2022-10-12 PROCEDURE — 25010000002 CEFTRIAXONE PER 250 MG: Performed by: EMERGENCY MEDICINE

## 2022-10-12 PROCEDURE — 84484 ASSAY OF TROPONIN QUANT: CPT | Performed by: EMERGENCY MEDICINE

## 2022-10-12 PROCEDURE — 94799 UNLISTED PULMONARY SVC/PX: CPT

## 2022-10-12 PROCEDURE — 99284 EMERGENCY DEPT VISIT MOD MDM: CPT

## 2022-10-12 RX ORDER — METHYLPREDNISOLONE SODIUM SUCCINATE 125 MG/2ML
125 INJECTION, POWDER, LYOPHILIZED, FOR SOLUTION INTRAMUSCULAR; INTRAVENOUS ONCE
Status: COMPLETED | OUTPATIENT
Start: 2022-10-12 | End: 2022-10-12

## 2022-10-12 RX ORDER — IPRATROPIUM BROMIDE AND ALBUTEROL SULFATE 2.5; .5 MG/3ML; MG/3ML
3 SOLUTION RESPIRATORY (INHALATION) ONCE
Status: COMPLETED | OUTPATIENT
Start: 2022-10-12 | End: 2022-10-12

## 2022-10-12 RX ORDER — DOXYCYCLINE 100 MG/1
100 CAPSULE ORAL 2 TIMES DAILY
Qty: 20 CAPSULE | Refills: 0 | Status: SHIPPED | OUTPATIENT
Start: 2022-10-12 | End: 2022-10-22

## 2022-10-12 RX ORDER — ASPIRIN 81 MG/1
324 TABLET, CHEWABLE ORAL ONCE
Status: COMPLETED | OUTPATIENT
Start: 2022-10-12 | End: 2022-10-12

## 2022-10-12 RX ORDER — SODIUM CHLORIDE 0.9 % (FLUSH) 0.9 %
10 SYRINGE (ML) INJECTION AS NEEDED
Status: DISCONTINUED | OUTPATIENT
Start: 2022-10-12 | End: 2022-10-12 | Stop reason: HOSPADM

## 2022-10-12 RX ORDER — PREDNISONE 20 MG/1
20 TABLET ORAL
Qty: 15 TABLET | Refills: 0 | Status: ON HOLD | OUTPATIENT
Start: 2022-10-12 | End: 2023-02-14

## 2022-10-12 RX ADMIN — ASPIRIN 324 MG: 81 TABLET, CHEWABLE ORAL at 11:19

## 2022-10-12 RX ADMIN — CEFTRIAXONE 1 G: 1 INJECTION, POWDER, FOR SOLUTION INTRAMUSCULAR; INTRAVENOUS at 12:49

## 2022-10-12 RX ADMIN — IPRATROPIUM BROMIDE AND ALBUTEROL SULFATE 3 ML: .5; 2.5 SOLUTION RESPIRATORY (INHALATION) at 12:36

## 2022-10-12 RX ADMIN — METHYLPREDNISOLONE SODIUM SUCCINATE 125 MG: 125 INJECTION, POWDER, FOR SOLUTION INTRAMUSCULAR; INTRAVENOUS at 12:49

## 2022-10-13 ENCOUNTER — OFFICE VISIT (OUTPATIENT)
Dept: UROLOGY | Facility: CLINIC | Age: 87
End: 2022-10-13

## 2022-10-13 VITALS — WEIGHT: 199.96 LBS | BODY MASS INDEX: 28.63 KG/M2 | HEIGHT: 70 IN

## 2022-10-13 DIAGNOSIS — R35.1 BPH ASSOCIATED WITH NOCTURIA: Primary | ICD-10-CM

## 2022-10-13 DIAGNOSIS — C61 CA OF PROSTATE: ICD-10-CM

## 2022-10-13 DIAGNOSIS — N42.9 DISORDER OF PROSTATE: ICD-10-CM

## 2022-10-13 DIAGNOSIS — N40.1 BPH ASSOCIATED WITH NOCTURIA: Primary | ICD-10-CM

## 2022-10-13 PROCEDURE — 84153 ASSAY OF PSA TOTAL: CPT | Performed by: UROLOGY

## 2022-10-13 PROCEDURE — 99213 OFFICE O/P EST LOW 20 MIN: CPT | Performed by: UROLOGY

## 2022-10-13 RX ORDER — TAMSULOSIN HYDROCHLORIDE 0.4 MG/1
1 CAPSULE ORAL NIGHTLY
Qty: 90 CAPSULE | Refills: 3 | Status: SHIPPED | OUTPATIENT
Start: 2022-10-13

## 2022-10-13 NOTE — PROGRESS NOTES
Chief Complaint:      Chief Complaint   Patient presents with   • Benign Prostatic Hypertrophy       HPI:   87 y.o. male returns today.  He had a good year.  He has no he reports no lower urinary tract symptomatology, particularly irritative symptoms such as frequency, urgency, dysuria, and obstructive symptomatology, particularly dribbling, hesitancy, and intermittency.  He has stage II chronic kidney disease.  He sees Heidy Tabares who gets his PSAs and said he is fine.  He had a radical prostatectomy and is doing great  Past Medical History:     Past Medical History:   Diagnosis Date   • Acute myocardial infarction (HCC)    • Adenocarcinoma (HCC)     prostate gland   • Arthritis    • Heart attack (HCC)    • Hx of radiation therapy    • Hypertension    • Prostate cancer (HCC)        Current Meds:     Current Outpatient Medications   Medication Sig Dispense Refill   • aspirin 81 MG tablet Take 1 tablet by mouth Daily. 30 tablet 11   • Cholecalciferol (Vitamin D3) 50 MCG (2000 UT) tablet Take 2,000 Units by mouth Daily.     • cyclobenzaprine (FLEXERIL) 10 MG tablet Take 5 mg by mouth At Night As Needed for Muscle Spasms.     • Diclofenac Sodium (VOLTAREN) 1 % gel gel Apply 2 g topically to the appropriate area as directed 4 (Four) Times a Day As Needed.     • doxycycline (MONODOX) 100 MG capsule Take 1 capsule by mouth 2 (Two) Times a Day for 10 days. 20 capsule 0   • famotidine (PEPCID) 40 MG tablet      • Glucosamine-Chondroitin (OSTEO BI-FLEX REGULAR STRENGTH PO) Take  by mouth.     • lisinopril (PRINIVIL,ZESTRIL) 10 MG tablet Take 1 tablet by mouth Daily.     • magnesium oxide (MAG-OX) 400 MG tablet Take 400 mg by mouth Daily.     • methylPREDNISolone (MEDROL) 4 MG dose pack Use as directed by package instructions 21 tablet 0   • metoprolol succinate XL (TOPROL-XL) 25 MG 24 hr tablet Take 1 tablet by mouth Daily. 90 tablet 3   • multivitamin with minerals tablet tablet Take 1 tablet by mouth Daily.     •  naproxen (NAPROSYN) 500 MG tablet Take 1 tablet by mouth 2 (Two) Times a Day With Meals. 60 tablet 2   • predniSONE (DELTASONE) 20 MG tablet Take 1 tablet by mouth 3 (Three) Times a Day With Meals. 15 tablet 0   • rosuvastatin (CRESTOR) 5 MG tablet Take 5 mg by mouth Every Night.     • tamsulosin (FLOMAX) 0.4 MG capsule 24 hr capsule Take 1 capsule by mouth Every Night.     • traMADol (ULTRAM) 50 MG tablet Take 50 mg by mouth 2 (Two) Times a Day As Needed.     • traZODone (DESYREL) 100 MG tablet      • triamcinolone (KENALOG) 0.5 % cream Apply 1 application topically to the appropriate area as directed 4 (Four) Times a Day. Prior to [facility] Admission, Patient was on:  Apply to rash     • vitamin B-12 (CYANOCOBALAMIN) 1000 MCG tablet Take 1,000 mcg by mouth Daily.       No current facility-administered medications for this visit.        Allergies:      Allergies   Allergen Reactions   • Adhesive Tape Rash        Past Surgical History:     Past Surgical History:   Procedure Laterality Date   • BIOPSY PROSTATE NEEDLE / PUNCH / INCISIONAL     • CORONARY ARTERY BYPASS GRAFT     • KNEE SURGERY Right    • OTHER SURGICAL HISTORY      Coronary Artery Triple Arterial Byupass Graft   • PROSTATE SURGERY     • PROSTATECTOMY      Robotic-assisted   • SKIN CANCER EXCISION Left     removed from left ear   • VASECTOMY         Social History:     Social History     Socioeconomic History   • Marital status:    Tobacco Use   • Smoking status: Never   • Smokeless tobacco: Never   Vaping Use   • Vaping Use: Never used   Substance and Sexual Activity   • Alcohol use: Yes     Comment: once in awhile   • Drug use: Never   • Sexual activity: Defer       Family History:     Family History   Problem Relation Age of Onset   • Kidney disease Father    • Heart disease Father    • Heart attack Father    • Kidney disease Mother    • Heart disease Mother        Review of Systems:     Review of Systems   Constitutional: Negative.    HENT:  Negative.    Eyes: Negative.    Respiratory: Negative.    Cardiovascular: Negative.    Gastrointestinal: Negative.    Endocrine: Negative.    Musculoskeletal: Negative.    Allergic/Immunologic: Negative.    Neurological: Negative.    Hematological: Negative.    Psychiatric/Behavioral: Negative.        Physical Exam:     Physical Exam  Vitals and nursing note reviewed.   Constitutional:       Appearance: He is well-developed.   HENT:      Head: Normocephalic and atraumatic.   Eyes:      Conjunctiva/sclera: Conjunctivae normal.      Pupils: Pupils are equal, round, and reactive to light.   Cardiovascular:      Rate and Rhythm: Normal rate and regular rhythm.      Heart sounds: Normal heart sounds.   Pulmonary:      Effort: Pulmonary effort is normal.      Breath sounds: Normal breath sounds.   Abdominal:      General: Bowel sounds are normal.      Palpations: Abdomen is soft.   Musculoskeletal:         General: Normal range of motion.      Cervical back: Normal range of motion.   Skin:     General: Skin is warm and dry.   Neurological:      Mental Status: He is alert and oriented to person, place, and time.      Deep Tendon Reflexes: Reflexes are normal and symmetric.   Psychiatric:         Behavior: Behavior normal.         Thought Content: Thought content normal.         Judgment: Judgment normal.         I have reviewed the following portions of the patient's history: Allergies, current medications, past family history, past medical history, past social history, past surgical history, problem list, and ROS and confirm it is accurate.    Recent Image (CT and/or KUB):      CT Abdomen and Pelvis: No results found for this or any previous visit.       CT Stone Protocol: No results found for this or any previous visit.       KUB: No results found for this or any previous visit.       Labs (past 3 months):      Admission on 10/12/2022, Discharged on 10/12/2022   Component Date Value Ref Range Status   • QT Interval  10/12/2022 380  ms Final   • QTC Interval 10/12/2022 410  ms Final   • Glucose 10/12/2022 192 (H)  65 - 99 mg/dL Final   • BUN 10/12/2022 22  8 - 23 mg/dL Final   • Creatinine 10/12/2022 1.30 (H)  0.76 - 1.27 mg/dL Final   • Sodium 10/12/2022 144  136 - 145 mmol/L Final   • Potassium 10/12/2022 4.4  3.5 - 5.2 mmol/L Final    Slight hemolysis detected by analyzer. Results may be affected.   • Chloride 10/12/2022 107  98 - 107 mmol/L Final   • CO2 10/12/2022 24.0  22.0 - 29.0 mmol/L Final   • Calcium 10/12/2022 9.4  8.6 - 10.5 mg/dL Final   • Total Protein 10/12/2022 7.1  6.0 - 8.5 g/dL Final   • Albumin 10/12/2022 4.20  3.50 - 5.20 g/dL Final   • ALT (SGPT) 10/12/2022 16  1 - 41 U/L Final   • AST (SGOT) 10/12/2022 20  1 - 40 U/L Final   • Alkaline Phosphatase 10/12/2022 77  39 - 117 U/L Final   • Total Bilirubin 10/12/2022 0.2  0.0 - 1.2 mg/dL Final   • Globulin 10/12/2022 2.9  gm/dL Final   • A/G Ratio 10/12/2022 1.4  g/dL Final   • BUN/Creatinine Ratio 10/12/2022 16.9  7.0 - 25.0 Final   • Anion Gap 10/12/2022 13.0  5.0 - 15.0 mmol/L Final   • eGFR 10/12/2022 53.5 (L)  >60.0 mL/min/1.73 Final    National Kidney Foundation and American Society of Nephrology (ASN) Task Force recommended calculation based on the Chronic Kidney Disease Epidemiology Collaboration (CKD-EPI) equation refit without adjustment for race.   • Troponin T 10/12/2022 <0.010  0.000 - 0.030 ng/mL Final   • Troponin T 10/12/2022 <0.010  0.000 - 0.030 ng/mL Final   • Extra Tube 10/12/2022 Hold for add-ons.   Final    Auto resulted.   • Extra Tube 10/12/2022 hold for add-on   Final    Auto resulted   • Extra Tube 10/12/2022 Hold for add-ons.   Final    Auto resulted.   • Extra Tube 10/12/2022 Hold for add-ons.   Final    Auto resulted   • WBC 10/12/2022 8.59  3.40 - 10.80 10*3/mm3 Final   • RBC 10/12/2022 4.23  4.14 - 5.80 10*6/mm3 Final   • Hemoglobin 10/12/2022 13.3  13.0 - 17.7 g/dL Final   • Hematocrit 10/12/2022 40.0  37.5 - 51.0 % Final   • MCV  10/12/2022 94.6  79.0 - 97.0 fL Final   • MCH 10/12/2022 31.4  26.6 - 33.0 pg Final   • MCHC 10/12/2022 33.3  31.5 - 35.7 g/dL Final   • RDW 10/12/2022 13.5  12.3 - 15.4 % Final   • RDW-SD 10/12/2022 46.2  37.0 - 54.0 fl Final   • MPV 10/12/2022 9.2  6.0 - 12.0 fL Final   • Platelets 10/12/2022 208  140 - 450 10*3/mm3 Final   • Neutrophil % 10/12/2022 69.5  42.7 - 76.0 % Final   • Lymphocyte % 10/12/2022 18.5 (L)  19.6 - 45.3 % Final   • Monocyte % 10/12/2022 7.3  5.0 - 12.0 % Final   • Eosinophil % 10/12/2022 3.4  0.3 - 6.2 % Final   • Basophil % 10/12/2022 0.5  0.0 - 1.5 % Final   • Immature Grans % 10/12/2022 0.8 (H)  0.0 - 0.5 % Final   • Neutrophils, Absolute 10/12/2022 5.97  1.70 - 7.00 10*3/mm3 Final   • Lymphocytes, Absolute 10/12/2022 1.59  0.70 - 3.10 10*3/mm3 Final   • Monocytes, Absolute 10/12/2022 0.63  0.10 - 0.90 10*3/mm3 Final   • Eosinophils, Absolute 10/12/2022 0.29  0.00 - 0.40 10*3/mm3 Final   • Basophils, Absolute 10/12/2022 0.04  0.00 - 0.20 10*3/mm3 Final   • Immature Grans, Absolute 10/12/2022 0.07 (H)  0.00 - 0.05 10*3/mm3 Final   • nRBC 10/12/2022 0.0  0.0 - 0.2 /100 WBC Final        Procedure:       Assessment/Plan:   Prostate cancer:  He returns today status post biopsy for extensive discussion of prostate cancer.  We discussed staging and grading of the disease.  I described with a [place] system being from a 2 to10 scale with the most common low-grade pattern being a Rancho 6.  I discussed the staging workup including a total body bone scan and CT scan especially with a PSA greater than 10.  We discussed the various options at length. I discussed a radical retropubic prostatectomy done in the traditional fashion and using the robotic technique.  I then discussed radiation treatment both seed therapy and external beam therapy using Gold fiduciary markers.  We talked about some of the other alternatives such as a cryosurgical ablation at high intensity focused ultrasound as being viable  alternatives but not recommended at this time.  He focused on aggressive watchful waiting and explained that currently low literature it's been discovered that a lot of men can actually observe it especially with numerous other comorbidities cannot have problems from the cancer by itself.  Talked about hormonal ablation and the side effects of creation of insulin resistance.  Overall, the patient was given appropriate literature, is going to think about it, and I'm going to revisit the topic with them after the next office visit.  PSA testing-I am recommending a PSA blood test that stands for prostate specific antigen.  I discussed the pathophysiology of PSA testing indicating its use in the diagnosis and management of prostate cancer.  I discussed the normal range being 0 to 4, but more appropriately being much closer to 0 to 2 in a normal male.  I discussed the fact that after a certain age we don't recommend PSA testing especially in view of numerous comorbidities, that this will not be a useful test.  I discussed many of the things that can artificially raise PSA including a recent infection, urinary tract infection, and recent sexual intercourse, or even the type of movement such as manipulation of the prostate from riding a bicycle.  After all this is taken into account when the test is reviewed, the most important use of PSA is the velocity measurement.  In other words, the change of PSA with time is a very important factor in the use and that we look for greater than 20% rise over a year to help us make the prediction of prostate cancer.  I also discussed that the use with prostate cancer indicating that after a radical prostatectomy, the PSA should be 0 and any rise indicates an early biochemical recurrence.                This document has been electronically signed by JAN SANDOVAL MD October 13, 2022 11:30 EDT    Dictated Utilizing Dragon Dictation: Part of this note may be an electronic  transcription/translation of spoken language to printed text using the Dragon Dictation System.

## 2022-10-14 LAB — PSA SERPL-MCNC: <0.014 NG/ML (ref 0–4)

## 2022-10-19 NOTE — ED PROVIDER NOTES
Subjective     History provided by:  Patient   used: No    URI  Presenting symptoms: congestion, cough, facial pain and rhinorrhea    Presenting symptoms: no ear pain, no fatigue, no fever and no sore throat    Severity:  Mild  Onset quality:  Gradual  Timing:  Constant  Progression:  Worsening  Chronicity:  New  Relieved by:  Nothing  Worsened by:  Nothing  Ineffective treatments:  None tried  Associated symptoms: no arthralgias, no headaches, no myalgias, no neck pain, no sinus pain, no sneezing, no swollen glands and no wheezing    Risk factors: being elderly, chronic cardiac disease and immunosuppression    Risk factors: no chronic kidney disease, no chronic respiratory disease, no diabetes mellitus, no recent illness, no recent travel and no sick contacts        Review of Systems   Constitutional: Negative for activity change, appetite change, chills, diaphoresis, fatigue and fever.   HENT: Positive for congestion and rhinorrhea. Negative for ear pain, sinus pain, sneezing and sore throat.    Eyes: Negative for redness.   Respiratory: Positive for cough. Negative for chest tightness, shortness of breath and wheezing.    Cardiovascular: Negative for chest pain, palpitations and leg swelling.   Gastrointestinal: Negative for abdominal pain, diarrhea, nausea and vomiting.   Genitourinary: Negative for dysuria and urgency.   Musculoskeletal: Negative for arthralgias, back pain, myalgias and neck pain.   Skin: Negative for pallor, rash and wound.   Neurological: Negative for dizziness, speech difficulty, weakness and headaches.   Psychiatric/Behavioral: Negative for agitation, behavioral problems, confusion and decreased concentration.   All other systems reviewed and are negative.      Past Medical History:   Diagnosis Date   • Acute myocardial infarction (HCC)    • Adenocarcinoma (HCC)     prostate gland   • Arthritis    • Heart attack (HCC)    • Hx of radiation therapy    • Hypertension    •  Prostate cancer (HCC)        Allergies   Allergen Reactions   • Adhesive Tape Rash       Past Surgical History:   Procedure Laterality Date   • BIOPSY PROSTATE NEEDLE / PUNCH / INCISIONAL     • CORONARY ARTERY BYPASS GRAFT     • KNEE SURGERY Right    • OTHER SURGICAL HISTORY      Coronary Artery Triple Arterial Byupass Graft   • PROSTATE SURGERY     • PROSTATECTOMY      Robotic-assisted   • SKIN CANCER EXCISION Left     removed from left ear   • VASECTOMY         Family History   Problem Relation Age of Onset   • Kidney disease Father    • Heart disease Father    • Heart attack Father    • Kidney disease Mother    • Heart disease Mother        Social History     Socioeconomic History   • Marital status:    Tobacco Use   • Smoking status: Never   • Smokeless tobacco: Never   Vaping Use   • Vaping Use: Never used   Substance and Sexual Activity   • Alcohol use: Yes     Comment: once in awhile   • Drug use: Never   • Sexual activity: Defer           Objective   Physical Exam  Vitals and nursing note reviewed.   Constitutional:       General: He is not in acute distress.     Appearance: Normal appearance. He is well-developed. He is not toxic-appearing or diaphoretic.   HENT:      Head: Normocephalic and atraumatic.      Right Ear: External ear normal.      Left Ear: External ear normal.      Nose: Nose normal.      Mouth/Throat:      Pharynx: No oropharyngeal exudate.      Tonsils: No tonsillar exudate.   Eyes:      General: Lids are normal.      Conjunctiva/sclera: Conjunctivae normal.      Pupils: Pupils are equal, round, and reactive to light.   Neck:      Thyroid: No thyromegaly.   Cardiovascular:      Rate and Rhythm: Normal rate and regular rhythm.      Pulses: Normal pulses.      Heart sounds: Normal heart sounds, S1 normal and S2 normal.   Pulmonary:      Effort: Pulmonary effort is normal. No tachypnea or respiratory distress.      Breath sounds: Normal breath sounds. No decreased breath sounds,  wheezing or rales.   Chest:      Chest wall: No tenderness.   Abdominal:      General: Bowel sounds are normal. There is no distension.      Palpations: Abdomen is soft.      Tenderness: There is no abdominal tenderness. There is no guarding or rebound.   Musculoskeletal:         General: No tenderness or deformity. Normal range of motion.      Cervical back: Full passive range of motion without pain, normal range of motion and neck supple.   Lymphadenopathy:      Cervical: No cervical adenopathy.   Skin:     General: Skin is warm and dry.      Coloration: Skin is not pale.      Findings: No erythema or rash.   Neurological:      Mental Status: He is alert and oriented to person, place, and time.      GCS: GCS eye subscore is 4. GCS verbal subscore is 5. GCS motor subscore is 6.      Cranial Nerves: No cranial nerve deficit.      Sensory: No sensory deficit.   Psychiatric:         Speech: Speech normal.         Behavior: Behavior normal.         Thought Content: Thought content normal.         Judgment: Judgment normal.         Procedures           ED Course  ED Course as of 10/19/22 1658   Wed Oct 12, 2022   1155 ECG 12 Lead  Vent. Rate :  70 BPM     Atrial Rate :  70 BPM     P-R Int : 176 ms          QRS Dur :  94 ms      QT Int : 380 ms       P-R-T Axes :  25  13  31 degrees     QTc Int : 410 ms     Normal sinus rhythm  Septal infarct (cited on or before 24-MAR-2015)  Abnormal ECG  When compared with ECG of 08-OCT-2021 20:15,  Nonspecific T wave abnormality no longer evident in Lateral leads [ES]   1406 XR Chest 1 View  IMPRESSION:  No radiographic evidence of acute cardiac or pulmonary disease. [ES]   Wed Oct 19, 2022   1657 Heart Score: 4 [ES]      ED Course User Index  [ES] Rajesh Wang MD                                           MDM  Number of Diagnoses or Management Options  Upper respiratory tract infection, unspecified type: new and requires workup     Amount and/or Complexity of Data  Reviewed  Clinical lab tests: reviewed and ordered  Tests in the radiology section of CPT®: reviewed and ordered  Tests in the medicine section of CPT®: reviewed and ordered  Review and summarize past medical records: yes  Independent visualization of images, tracings, or specimens: yes    Risk of Complications, Morbidity, and/or Mortality  Presenting problems: moderate  Diagnostic procedures: moderate  Management options: moderate    Patient Progress  Patient progress: stable      Final diagnoses:   Upper respiratory tract infection, unspecified type       ED Disposition  ED Disposition     ED Disposition   Discharge    Condition   Stable    Comment   Pt ambulated out of ED. IV taken out. Handouts given to take home.             Heidy Tabares, APRN  [address] [id number]  [phone]    Schedule an appointment as soon as possible for a visit in 1 day  EVALUATE         Medication List      New Prescriptions    doxycycline 100 MG capsule  Commonly known as: MONODOX  Take 1 capsule by mouth 2 (Two) Times a Day for 10 days.     predniSONE 20 MG tablet  Commonly known as: DELTASONE  Take 1 tablet by mouth 3 (Three) Times a Day With Meals.           Where to Get Your Medications      You can get these medications from any pharmacy    Bring a paper prescription for each of these medications  · doxycycline 100 MG capsule  · predniSONE 20 MG tablet          Rajesh Wang MD  10/19/22 2967

## 2022-12-13 RX ORDER — NAPROXEN 500 MG/1
TABLET ORAL
Qty: 180 TABLET | Refills: 2 | Status: SHIPPED | OUTPATIENT
Start: 2022-12-13 | End: 2023-02-14 | Stop reason: HOSPADM

## 2022-12-28 ENCOUNTER — OFFICE VISIT (OUTPATIENT)
Dept: GASTROENTEROLOGY | Facility: CLINIC | Age: 87
End: 2022-12-28

## 2022-12-28 VITALS
DIASTOLIC BLOOD PRESSURE: 74 MMHG | HEART RATE: 62 BPM | BODY MASS INDEX: 30.06 KG/M2 | HEIGHT: 70 IN | WEIGHT: 210 LBS | SYSTOLIC BLOOD PRESSURE: 184 MMHG

## 2022-12-28 DIAGNOSIS — K21.9 GASTROESOPHAGEAL REFLUX DISEASE WITHOUT ESOPHAGITIS: Primary | ICD-10-CM

## 2022-12-28 DIAGNOSIS — K21.9 GASTROESOPHAGEAL REFLUX DISEASE WITHOUT ESOPHAGITIS: ICD-10-CM

## 2022-12-28 DIAGNOSIS — Z12.11 ENCOUNTER FOR SCREENING FOR MALIGNANT NEOPLASM OF COLON: ICD-10-CM

## 2022-12-28 DIAGNOSIS — Z12.11 ENCOUNTER FOR SCREENING FOR MALIGNANT NEOPLASM OF COLON: Primary | ICD-10-CM

## 2022-12-28 PROCEDURE — 99214 OFFICE O/P EST MOD 30 MIN: CPT | Performed by: PHYSICIAN ASSISTANT

## 2022-12-28 RX ORDER — BISACODYL 5 MG/1
20 TABLET, DELAYED RELEASE ORAL ONCE
Qty: 4 TABLET | Refills: 0 | Status: SHIPPED | OUTPATIENT
Start: 2022-12-28 | End: 2022-12-28

## 2022-12-28 RX ORDER — POLYETHYLENE GLYCOL 3350 17 G/17G
510 POWDER, FOR SOLUTION ORAL ONCE
Qty: 510 G | Refills: 0 | Status: SHIPPED | OUTPATIENT
Start: 2022-12-28 | End: 2022-12-28

## 2022-12-28 RX ORDER — POLYETHYLENE GLYCOL 3350 17 G/17G
510 POWDER, FOR SOLUTION ORAL TAKE AS DIRECTED
Qty: 510 G | Refills: 0 | Status: SHIPPED | OUTPATIENT
Start: 2022-12-28 | End: 2023-02-14 | Stop reason: HOSPADM

## 2022-12-28 NOTE — PROGRESS NOTES
Chief Complaint   Patient presents with   • Colonoscopy       Angelito Carter is a 87 y.o. male who presents to the office today for evaluation of Colonoscopy  .    HPI   The patient is a new patient presenting needing a colonoscopy. He is accompanied by an adult female who contributes to his history.    Diarrhea  He reports that he has had diarrhea and occasionally has blood in his stool. The patient states that he will have stomach problems for a week and then will have diarrhea for the next week. He notes that it depends on his diet.  He states that he has pain. The patient notes that it is not a crampy pain or sharp pain. He reports that he will wake up in the night sometimes and if he has a bowel movement, he will have a bowel movement. He denies having heartburn or reflux. The patient denies having trouble swallowing. He reports that 2 years ago he had a hole in his stomach. The patient states that he was kept in the hospital for a while. He notes that they cured that with medicine. The patient reports that he is still taking the famotidine.    Coronary artery disease  He states that he has had triple bypass and prostate operation. The patient notes that he follows with his cardiologist.  Review of Systems   Constitutional: Negative for fever.   HENT: Negative for trouble swallowing.    Eyes: Negative.    Respiratory: Negative.    Cardiovascular: Negative.    Gastrointestinal: Positive for abdominal distention, abdominal pain, anal bleeding, blood in stool and diarrhea. Negative for constipation, nausea, rectal pain and vomiting.   Endocrine: Negative.    Genitourinary: Negative.    Musculoskeletal: Negative.    Skin: Negative.    Allergic/Immunologic: Negative.    Neurological: Negative.    Hematological: Negative.    Psychiatric/Behavioral: Negative.                ACTIVE PROBLEMS:   Specialty Problems    None      PAST MEDICAL HISTORY:  Past Medical History:   Diagnosis Date   • Acute myocardial infarction  (HCC)    • Adenocarcinoma (HCC)     prostate gland   • Arthritis    • Heart attack (HCC)    • Hx of radiation therapy    • Hypertension    • Prostate cancer (HCC)        SURGICAL HISTORY:  Past Surgical History:   Procedure Laterality Date   • BIOPSY PROSTATE NEEDLE / PUNCH / INCISIONAL     • CORONARY ARTERY BYPASS GRAFT     • KNEE SURGERY Right    • OTHER SURGICAL HISTORY      Coronary Artery Triple Arterial Byupass Graft   • PROSTATE SURGERY     • PROSTATECTOMY      Robotic-assisted   • SKIN CANCER EXCISION Left     removed from left ear   • VASECTOMY         FAMILY HISTORY:  Family History   Problem Relation Age of Onset   • Kidney disease Father    • Heart disease Father    • Heart attack Father    • Kidney disease Mother    • Heart disease Mother        SOCIAL HISTORY:  Social History     Tobacco Use   • Smoking status: Never   • Smokeless tobacco: Never   Substance Use Topics   • Alcohol use: Yes     Comment: once in awhile       CURRENT MEDICATION:    Current Outpatient Medications:   •  aspirin 81 MG tablet, Take 1 tablet by mouth Daily., Disp: 30 tablet, Rfl: 11  •  Cholecalciferol (Vitamin D3) 50 MCG (2000 UT) tablet, Take 2,000 Units by mouth Daily., Disp: , Rfl:   •  cyclobenzaprine (FLEXERIL) 10 MG tablet, Take 5 mg by mouth At Night As Needed for Muscle Spasms., Disp: , Rfl:   •  Diclofenac Sodium (VOLTAREN) 1 % gel gel, Apply 2 g topically to the appropriate area as directed 4 (Four) Times a Day As Needed., Disp: , Rfl:   •  famotidine (PEPCID) 40 MG tablet, , Disp: , Rfl:   •  Glucosamine-Chondroitin (OSTEO BI-FLEX REGULAR STRENGTH PO), Take  by mouth., Disp: , Rfl:   •  lisinopril (PRINIVIL,ZESTRIL) 10 MG tablet, Take 1 tablet by mouth Daily., Disp: , Rfl:   •  magnesium oxide (MAG-OX) 400 MG tablet, Take 400 mg by mouth Daily., Disp: , Rfl:   •  methylPREDNISolone (MEDROL) 4 MG dose pack, Use as directed by package instructions, Disp: 21 tablet, Rfl: 0  •  metoprolol succinate XL (TOPROL-XL) 25 MG  "24 hr tablet, Take 1 tablet by mouth Daily., Disp: 90 tablet, Rfl: 3  •  multivitamin with minerals tablet tablet, Take 1 tablet by mouth Daily., Disp: , Rfl:   •  naproxen (NAPROSYN) 500 MG tablet, TAKE ONE TABLET BY MOUTH TWICE A DAY WITH MEALS, Disp: 180 tablet, Rfl: 2  •  polyethylene glycol (MIRALAX) 17 GM/SCOOP powder, Take 510 g by mouth Take As Directed. Place 15 cap fulls of powder in 32 oz of liquid of choice at 4:00 and 10:00 PM, Disp: 510 g, Rfl: 0  •  predniSONE (DELTASONE) 20 MG tablet, Take 1 tablet by mouth 3 (Three) Times a Day With Meals., Disp: 15 tablet, Rfl: 0  •  rosuvastatin (CRESTOR) 5 MG tablet, Take 5 mg by mouth Every Night., Disp: , Rfl:   •  tamsulosin (FLOMAX) 0.4 MG capsule 24 hr capsule, Take 1 capsule by mouth Every Night., Disp: 90 capsule, Rfl: 3  •  traMADol (ULTRAM) 50 MG tablet, Take 50 mg by mouth 2 (Two) Times a Day As Needed., Disp: , Rfl:   •  traZODone (DESYREL) 100 MG tablet, , Disp: , Rfl:   •  triamcinolone (KENALOG) 0.5 % cream, Apply 1 application topically to the appropriate area as directed 4 (Four) Times a Day. Prior to [facility] Admission, Patient was on:  Apply to rash, Disp: , Rfl:   •  vitamin B-12 (CYANOCOBALAMIN) 1000 MCG tablet, Take 1,000 mcg by mouth Daily., Disp: , Rfl:     ALLERGIES:  Adhesive tape    VISIT VITALS:  BP (!) 184/74   Pulse 62   Ht 177.8 cm (70\")   Wt 95.3 kg (210 lb)   BMI 30.13 kg/m²   Physical Exam  Constitutional:       General: He is not in acute distress.     Appearance: Normal appearance. He is well-developed.   HENT:      Head: Normocephalic and atraumatic.   Eyes:      Pupils: Pupils are equal, round, and reactive to light.   Cardiovascular:      Rate and Rhythm: Normal rate and regular rhythm.      Heart sounds: Normal heart sounds.   Pulmonary:      Effort: Pulmonary effort is normal. No respiratory distress.      Breath sounds: Normal breath sounds. No wheezing, rhonchi or rales.   Abdominal:      General: Abdomen is flat. " Bowel sounds are normal. There is no distension.      Palpations: Abdomen is soft. There is no mass.      Tenderness: There is no abdominal tenderness. There is no guarding or rebound.      Hernia: No hernia is present.   Musculoskeletal:         General: No swelling. Normal range of motion.      Cervical back: Normal range of motion and neck supple.      Right lower leg: No edema.      Left lower leg: No edema.   Skin:     General: Skin is warm and dry.   Neurological:      Mental Status: He is alert and oriented to person, place, and time.   Psychiatric:         Attention and Perception: Attention normal.         Mood and Affect: Mood normal.         Speech: Speech normal.         Behavior: Behavior normal. Behavior is cooperative.         Thought Content: Thought content normal.         Assessment        1. Diarrhea  We will obtain a colonoscopy. We will also obtain a colonoscopy. We will wait until after his colonoscopy is completed.     Diagnosis Plan   1. Gastroesophageal reflux disease without esophagitis  bisacodyl (DULCOLAX) 5 MG EC tablet    polyethylene glycol (MIRALAX) 17 GM/SCOOP powder      2. Encounter for screening for malignant neoplasm of colon  bisacodyl (DULCOLAX) 5 MG EC tablet    polyethylene glycol (MIRALAX) 17 GM/SCOOP powder          Return for after procedure follow-up.               This document has been electronically signed by Luis Manuel Shaffer PA-C  December 29, 2022 12:32 EST    Part of this note may be an electronic transcription/translation of spoken language to printed text using the Dragon Dictation System.      Transcribed from ambient dictation for Luis Manuel Shaffer PA-C by Johana Oh.  12/28/22   11:08 EST    Patient or patient representative verbalized consent to the visit recording.

## 2023-01-10 ENCOUNTER — OFFICE VISIT (OUTPATIENT)
Dept: GASTROENTEROLOGY | Facility: CLINIC | Age: 88
End: 2023-01-10
Payer: MEDICARE

## 2023-01-10 VITALS — WEIGHT: 208 LBS | BODY MASS INDEX: 29.78 KG/M2 | HEIGHT: 70 IN

## 2023-01-10 DIAGNOSIS — R21 RASH AND NONSPECIFIC SKIN ERUPTION: Primary | ICD-10-CM

## 2023-01-10 PROCEDURE — 99213 OFFICE O/P EST LOW 20 MIN: CPT | Performed by: PHYSICIAN ASSISTANT

## 2023-01-10 RX ORDER — CLOTRIMAZOLE 1 %
1 CREAM (GRAM) TOPICAL 2 TIMES DAILY
Qty: 28 G | Refills: 0 | Status: SHIPPED | OUTPATIENT
Start: 2023-01-10

## 2023-01-10 NOTE — PROGRESS NOTES
"Chief Complaint   Patient presents with   • Rectal Pain       Angelito Carter is a 87 y.o. male who presents to the office today for evaluation of Rectal Pain  .    HPI     The patient is here for a follow-up for rectal pain. He was last seen in clinic on 12/28/2022, at which time he was scheduled for a colonoscopy due to diarrhea. He is scheduled for that on 02/14/2023. He was having occasional blood in his stool.    The patient is accompanied by an adult female, who contributes to his history.    The patient reports that he is not doing well. He states that he gets a rash every 1 to 2 months. He notes that it is like little clear blisters on his hips next to his rectum and in different places. He states that if he medicated them with Preparation H and baby soap then \"mashes\" them, they will go away. He notes that now none of his home remedies are working.  He states that the rash is never directly in the rectum; it is around it. He states he has not been able to sleep much because of the irritation. He reports that it burns. He reports that he uses baby soap. If he uses strong soap, it burns. He reports that he can get a hold of a blister, mash it, and he can feel it bust. He reports that he can then soak and clean it up. He states that most of the time he is okay in a couple of days.     He reports that he has had episodes of diarrhea ever since he had radiation treatments for prostate cancer. He reports that he does not have the control over his kidneys and bowels that he had before.      Review of Systems   Constitutional: Negative for fever.   HENT: Negative for trouble swallowing.    Eyes: Negative.    Respiratory: Negative.    Cardiovascular: Negative.    Gastrointestinal: Positive for abdominal distention, anal bleeding, blood in stool, diarrhea and rectal pain. Negative for abdominal pain, constipation, nausea and vomiting.   Endocrine: Negative.    Genitourinary: Negative.    Musculoskeletal: Negative.  "   Skin: Negative.    Allergic/Immunologic: Negative.    Neurological: Negative.    Hematological: Negative.    Psychiatric/Behavioral: Negative.        ACTIVE PROBLEMS:   Specialty Problems        Gastroenterology Problems    Gastroesophageal reflux disease without esophagitis           PAST MEDICAL HISTORY:  Past Medical History:   Diagnosis Date   • Acute myocardial infarction (HCC)    • Adenocarcinoma (HCC)     prostate gland   • Arthritis    • Heart attack (HCC)    • Hx of radiation therapy    • Hypertension    • Prostate cancer (HCC)        SURGICAL HISTORY:  Past Surgical History:   Procedure Laterality Date   • BIOPSY PROSTATE NEEDLE / PUNCH / INCISIONAL     • CORONARY ARTERY BYPASS GRAFT     • KNEE SURGERY Right    • OTHER SURGICAL HISTORY      Coronary Artery Triple Arterial Byupass Graft   • PROSTATE SURGERY     • PROSTATECTOMY      Robotic-assisted   • SKIN CANCER EXCISION Left     removed from left ear   • VASECTOMY         FAMILY HISTORY:  Family History   Problem Relation Age of Onset   • Kidney disease Father    • Heart disease Father    • Heart attack Father    • Kidney disease Mother    • Heart disease Mother        SOCIAL HISTORY:  Social History     Tobacco Use   • Smoking status: Never   • Smokeless tobacco: Never   Substance Use Topics   • Alcohol use: Yes     Comment: once in awhile       CURRENT MEDICATION:    Current Outpatient Medications:   •  aspirin 81 MG tablet, Take 1 tablet by mouth Daily., Disp: 30 tablet, Rfl: 11  •  Cholecalciferol (Vitamin D3) 50 MCG (2000 UT) tablet, Take 2,000 Units by mouth Daily., Disp: , Rfl:   •  cyclobenzaprine (FLEXERIL) 10 MG tablet, Take 5 mg by mouth At Night As Needed for Muscle Spasms., Disp: , Rfl:   •  Diclofenac Sodium (VOLTAREN) 1 % gel gel, Apply 2 g topically to the appropriate area as directed 4 (Four) Times a Day As Needed., Disp: , Rfl:   •  famotidine (PEPCID) 40 MG tablet, , Disp: , Rfl:   •  Glucosamine-Chondroitin (OSTEO BI-FLEX REGULAR  "STRENGTH PO), Take  by mouth., Disp: , Rfl:   •  lisinopril (PRINIVIL,ZESTRIL) 10 MG tablet, Take 1 tablet by mouth Daily., Disp: , Rfl:   •  magnesium oxide (MAG-OX) 400 MG tablet, Take 400 mg by mouth Daily., Disp: , Rfl:   •  methylPREDNISolone (MEDROL) 4 MG dose pack, Use as directed by package instructions, Disp: 21 tablet, Rfl: 0  •  metoprolol succinate XL (TOPROL-XL) 25 MG 24 hr tablet, Take 1 tablet by mouth Daily., Disp: 90 tablet, Rfl: 3  •  multivitamin with minerals tablet tablet, Take 1 tablet by mouth Daily., Disp: , Rfl:   •  naproxen (NAPROSYN) 500 MG tablet, TAKE ONE TABLET BY MOUTH TWICE A DAY WITH MEALS, Disp: 180 tablet, Rfl: 2  •  polyethylene glycol (MIRALAX) 17 GM/SCOOP powder, Take 510 g by mouth Take As Directed. Place 15 cap fulls of powder in 32 oz of liquid of choice at 4:00 and 10:00 PM, Disp: 510 g, Rfl: 0  •  predniSONE (DELTASONE) 20 MG tablet, Take 1 tablet by mouth 3 (Three) Times a Day With Meals., Disp: 15 tablet, Rfl: 0  •  rosuvastatin (CRESTOR) 5 MG tablet, Take 5 mg by mouth Every Night., Disp: , Rfl:   •  tamsulosin (FLOMAX) 0.4 MG capsule 24 hr capsule, Take 1 capsule by mouth Every Night., Disp: 90 capsule, Rfl: 3  •  traMADol (ULTRAM) 50 MG tablet, Take 50 mg by mouth 2 (Two) Times a Day As Needed., Disp: , Rfl:   •  traZODone (DESYREL) 100 MG tablet, , Disp: , Rfl:   •  triamcinolone (KENALOG) 0.5 % cream, Apply 1 application topically to the appropriate area as directed 4 (Four) Times a Day. Prior to [facility] Admission, Patient was on:  Apply to rash, Disp: , Rfl:   •  vitamin B-12 (CYANOCOBALAMIN) 1000 MCG tablet, Take 1,000 mcg by mouth Daily., Disp: , Rfl:   •  clotrimazole (Lotrimin AF) 1 % cream, Apply 1 application topically to the appropriate area as directed 2 (Two) Times a Day., Disp: 28 g, Rfl: 0    ALLERGIES:  Adhesive tape    VISIT VITALS:  Ht 177.8 cm (70\")   Wt 94.3 kg (208 lb)   BMI 29.84 kg/m²   Physical Exam  Constitutional:       General: He is not in " acute distress.     Appearance: Normal appearance. He is well-developed.   HENT:      Head: Normocephalic and atraumatic.   Eyes:      Pupils: Pupils are equal, round, and reactive to light.   Cardiovascular:      Rate and Rhythm: Normal rate and regular rhythm.      Heart sounds: Normal heart sounds.   Pulmonary:      Effort: Pulmonary effort is normal. No respiratory distress.      Breath sounds: Normal breath sounds. No wheezing, rhonchi or rales.   Abdominal:      General: Abdomen is flat. Bowel sounds are normal. There is no distension.      Palpations: Abdomen is soft. There is no mass.      Tenderness: There is no abdominal tenderness. There is no guarding or rebound.      Hernia: No hernia is present.   Genitourinary:         Comments: Bilateral raw dry area noted around anus in intergluteal cleft ending at scrotum  Musculoskeletal:         General: No swelling. Normal range of motion.      Cervical back: Normal range of motion and neck supple.      Right lower leg: No edema.      Left lower leg: No edema.   Skin:     General: Skin is warm and dry.   Neurological:      Mental Status: He is alert and oriented to person, place, and time.   Psychiatric:         Attention and Perception: Attention normal.         Mood and Affect: Mood normal.         Speech: Speech normal.         Behavior: Behavior normal. Behavior is cooperative.         Thought Content: Thought content normal.              Assessment       Diagnosis Plan   1. Rash and nonspecific skin eruption  clotrimazole (Lotrimin AF) 1 % cream        1. Rash  Patient was encouraged to discontinue use of all harsh soaps and use water only.  He will discontinue use of Preparation H as well.  He will try a clotrimazole lotion on the area to see if that helps improve symptoms somewhat.  He has a dermatology appointment coming up next week in which he will speak to them about this issue if not improved.    Return for after procedure follow-up.                      Part of this note may be an electronic transcription/translation of spoken language to printed text using the Dragon Dictation System.    Transcribed from ambient dictation for Luis Manuel Shaffer PA-C by Kala Yadav.  01/10/23   12:29 EST    Patient or patient representative verbalized consent to the visit recording.  I have personally performed the services described in this document as transcribed by the above individual, and it is both accurate and complete.

## 2023-02-10 ENCOUNTER — DOCUMENTATION (OUTPATIENT)
Dept: GASTROENTEROLOGY | Facility: CLINIC | Age: 88
End: 2023-02-10
Payer: MEDICARE

## 2023-02-10 NOTE — PROGRESS NOTES
I have called a few times to obtain a cardiac clearance from Daylin Ricks's office. I called again today and spoke with Susana and explained to her that patient is scheduled to have a EGD/Colonoscopy on 2-14-23 at 7:30 and we need to obtain one. She stated she would talk to Daylin and call me back.        I spoke with Susana and she said that they only have Angelito taking ASA 81 mg daily. I called and spoke to Angelito and his wife and they said that's all he takes and he has stopped it before and feels comfortable stopping it 3 days prior. He has seen Nany Laguerre in past but only see's her once a year.

## 2023-02-14 ENCOUNTER — ANESTHESIA EVENT (OUTPATIENT)
Dept: PERIOP | Facility: HOSPITAL | Age: 88
End: 2023-02-14
Payer: MEDICARE

## 2023-02-14 ENCOUNTER — ANESTHESIA (OUTPATIENT)
Dept: PERIOP | Facility: HOSPITAL | Age: 88
End: 2023-02-14
Payer: MEDICARE

## 2023-02-14 ENCOUNTER — HOSPITAL ENCOUNTER (OUTPATIENT)
Facility: HOSPITAL | Age: 88
Setting detail: HOSPITAL OUTPATIENT SURGERY
Discharge: HOME | End: 2023-02-14
Attending: INTERNAL MEDICINE | Admitting: INTERNAL MEDICINE
Payer: MEDICARE

## 2023-02-14 VITALS
SYSTOLIC BLOOD PRESSURE: 144 MMHG | DIASTOLIC BLOOD PRESSURE: 72 MMHG | WEIGHT: 200 LBS | OXYGEN SATURATION: 97 % | HEIGHT: 70 IN | TEMPERATURE: 97.3 F | HEART RATE: 58 BPM | RESPIRATION RATE: 18 BRPM | BODY MASS INDEX: 28.63 KG/M2

## 2023-02-14 DIAGNOSIS — Z12.11 ENCOUNTER FOR SCREENING FOR MALIGNANT NEOPLASM OF COLON: ICD-10-CM

## 2023-02-14 DIAGNOSIS — K21.9 GASTROESOPHAGEAL REFLUX DISEASE WITHOUT ESOPHAGITIS: ICD-10-CM

## 2023-02-14 PROCEDURE — 43239 EGD BIOPSY SINGLE/MULTIPLE: CPT | Performed by: INTERNAL MEDICINE

## 2023-02-14 PROCEDURE — 88342 IMHCHEM/IMCYTCHM 1ST ANTB: CPT

## 2023-02-14 PROCEDURE — 25010000002 PROPOFOL 200 MG/20ML EMULSION: Performed by: NURSE ANESTHETIST, CERTIFIED REGISTERED

## 2023-02-14 PROCEDURE — 88305 TISSUE EXAM BY PATHOLOGIST: CPT

## 2023-02-14 PROCEDURE — 45385 COLONOSCOPY W/LESION REMOVAL: CPT | Performed by: INTERNAL MEDICINE

## 2023-02-14 RX ORDER — SODIUM CHLORIDE 0.9 % (FLUSH) 0.9 %
10 SYRINGE (ML) INJECTION AS NEEDED
Status: DISCONTINUED | OUTPATIENT
Start: 2023-02-14 | End: 2023-02-14 | Stop reason: HOSPADM

## 2023-02-14 RX ORDER — FENTANYL CITRATE 50 UG/ML
50 INJECTION, SOLUTION INTRAMUSCULAR; INTRAVENOUS
Status: DISCONTINUED | OUTPATIENT
Start: 2023-02-14 | End: 2023-02-14 | Stop reason: HOSPADM

## 2023-02-14 RX ORDER — OXYCODONE HYDROCHLORIDE AND ACETAMINOPHEN 5; 325 MG/1; MG/1
1 TABLET ORAL ONCE AS NEEDED
Status: DISCONTINUED | OUTPATIENT
Start: 2023-02-14 | End: 2023-02-14 | Stop reason: HOSPADM

## 2023-02-14 RX ORDER — ONDANSETRON 2 MG/ML
4 INJECTION INTRAMUSCULAR; INTRAVENOUS AS NEEDED
Status: DISCONTINUED | OUTPATIENT
Start: 2023-02-14 | End: 2023-02-14 | Stop reason: HOSPADM

## 2023-02-14 RX ORDER — MEPERIDINE HYDROCHLORIDE 25 MG/ML
12.5 INJECTION INTRAMUSCULAR; INTRAVENOUS; SUBCUTANEOUS
Status: DISCONTINUED | OUTPATIENT
Start: 2023-02-14 | End: 2023-02-14 | Stop reason: HOSPADM

## 2023-02-14 RX ORDER — SODIUM CHLORIDE 0.9 % (FLUSH) 0.9 %
10 SYRINGE (ML) INJECTION EVERY 12 HOURS SCHEDULED
Status: DISCONTINUED | OUTPATIENT
Start: 2023-02-14 | End: 2023-02-14 | Stop reason: HOSPADM

## 2023-02-14 RX ORDER — PROPOFOL 10 MG/ML
INJECTION, EMULSION INTRAVENOUS AS NEEDED
Status: DISCONTINUED | OUTPATIENT
Start: 2023-02-14 | End: 2023-02-14 | Stop reason: SURG

## 2023-02-14 RX ORDER — SODIUM CHLORIDE, SODIUM LACTATE, POTASSIUM CHLORIDE, CALCIUM CHLORIDE 600; 310; 30; 20 MG/100ML; MG/100ML; MG/100ML; MG/100ML
125 INJECTION, SOLUTION INTRAVENOUS ONCE
Status: COMPLETED | OUTPATIENT
Start: 2023-02-14 | End: 2023-02-14

## 2023-02-14 RX ORDER — SODIUM CHLORIDE, SODIUM LACTATE, POTASSIUM CHLORIDE, CALCIUM CHLORIDE 600; 310; 30; 20 MG/100ML; MG/100ML; MG/100ML; MG/100ML
100 INJECTION, SOLUTION INTRAVENOUS ONCE AS NEEDED
Status: DISCONTINUED | OUTPATIENT
Start: 2023-02-14 | End: 2023-02-14 | Stop reason: HOSPADM

## 2023-02-14 RX ORDER — SODIUM CHLORIDE 9 MG/ML
40 INJECTION, SOLUTION INTRAVENOUS AS NEEDED
Status: DISCONTINUED | OUTPATIENT
Start: 2023-02-14 | End: 2023-02-14 | Stop reason: HOSPADM

## 2023-02-14 RX ORDER — IPRATROPIUM BROMIDE AND ALBUTEROL SULFATE 2.5; .5 MG/3ML; MG/3ML
3 SOLUTION RESPIRATORY (INHALATION) ONCE AS NEEDED
Status: DISCONTINUED | OUTPATIENT
Start: 2023-02-14 | End: 2023-02-14 | Stop reason: HOSPADM

## 2023-02-14 RX ORDER — PANTOPRAZOLE SODIUM 40 MG/1
40 TABLET, DELAYED RELEASE ORAL DAILY
Qty: 90 TABLET | Refills: 3 | Status: SHIPPED | OUTPATIENT
Start: 2023-02-14

## 2023-02-14 RX ORDER — MIDAZOLAM HYDROCHLORIDE 1 MG/ML
0.5 INJECTION INTRAMUSCULAR; INTRAVENOUS
Status: DISCONTINUED | OUTPATIENT
Start: 2023-02-14 | End: 2023-02-14 | Stop reason: HOSPADM

## 2023-02-14 RX ORDER — LIDOCAINE HYDROCHLORIDE 20 MG/ML
INJECTION, SOLUTION EPIDURAL; INFILTRATION; INTRACAUDAL; PERINEURAL AS NEEDED
Status: DISCONTINUED | OUTPATIENT
Start: 2023-02-14 | End: 2023-02-14 | Stop reason: SURG

## 2023-02-14 RX ADMIN — PROPOFOL 50 MG: 10 INJECTION, EMULSION INTRAVENOUS at 09:29

## 2023-02-14 RX ADMIN — PROPOFOL 50 MG: 10 INJECTION, EMULSION INTRAVENOUS at 09:21

## 2023-02-14 RX ADMIN — SODIUM CHLORIDE, POTASSIUM CHLORIDE, SODIUM LACTATE AND CALCIUM CHLORIDE: 600; 310; 30; 20 INJECTION, SOLUTION INTRAVENOUS at 09:01

## 2023-02-14 RX ADMIN — PROPOFOL 50 MG: 10 INJECTION, EMULSION INTRAVENOUS at 09:16

## 2023-02-14 RX ADMIN — LIDOCAINE HYDROCHLORIDE 100 MG: 20 INJECTION, SOLUTION EPIDURAL; INFILTRATION; INTRACAUDAL; PERINEURAL at 09:03

## 2023-02-14 RX ADMIN — PROPOFOL 50 MG: 10 INJECTION, EMULSION INTRAVENOUS at 09:11

## 2023-02-14 RX ADMIN — PROPOFOL 50 MG: 10 INJECTION, EMULSION INTRAVENOUS at 09:07

## 2023-02-14 RX ADMIN — PROPOFOL 100 MG: 10 INJECTION, EMULSION INTRAVENOUS at 09:03

## 2023-02-14 NOTE — ANESTHESIA POSTPROCEDURE EVALUATION
Patient: Angelito Carter    Procedure Summary     Date: 02/14/23 Room / Location: [facility] OR [address] [place] [address] COR OR    Anesthesia Start: 0901 Anesthesia Stop: 0937    Procedures:       ESOPHAGOGASTRODUODENOSCOPY WITH BIOPSY (Esophagus)      COLONOSCOPY FOR SCREENING Diagnosis:       Gastroesophageal reflux disease without esophagitis      Encounter for screening for malignant neoplasm of colon      (Gastroesophageal reflux disease without esophagitis [K21.9])      (Encounter for screening for malignant neoplasm of colon [Z12.11])    Surgeons: Cayla Strickland MD Provider: Zackary Wright MD    Anesthesia Type: general ASA Status: 3          Anesthesia Type: general    Vitals  Vitals Value Taken Time   /72 02/14/23 1010   Temp     Pulse 58 02/14/23 1010   Resp 18 02/14/23 1010   SpO2 97 % 02/14/23 1010           Post Anesthesia Care and Evaluation    Patient location during evaluation: PHASE II  Patient participation: complete - patient participated  Level of consciousness: awake and alert  Pain score: 0  Pain management: adequate    Airway patency: patent  Anesthetic complications: No anesthetic complications  PONV Status: controlled  Cardiovascular status: acceptable  Respiratory status: acceptable and room air  Hydration status: acceptable  No anesthesia care post op

## 2023-02-14 NOTE — H&P
[facility]    Patient Identification:  Name:  Angelito Carter  Age:  87 y.o.  Sex:  male  :  1935  MRN:  [id number]   Visit Number:  [id number]  Primary Care Physician:  Daylin Redmond APRN     Chief complaint:   Dysphagia, fecal incontinence, melena, nausea, epigastric pain    History of presenting illness:  87 y.o. male who presents today for colonoscopy due to diarrhea along occasional blood in his stool and rectal bleeding/pain. He has history of prostate cancer with surgical intervention and radiation therapy and reports fecal incontinence since that time.  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  He denies family history of colon cancer. He is also scheduled for EGD due to dysphagia, epigastric pain and nausea. He denies having weight loss. He has no other specific complaints at this time.     ---------------------------------------------------------------------------------------------------------------------   Review of Systems   Review of Systems   Constitutional: Negative for fever.   HENT: Negative for trouble swallowing.    Eyes: Negative.    Respiratory: Negative.    Cardiovascular: Negative.    Gastrointestinal: Positive for epigastric pain, anal bleeding, blood in stool, diarrhea, nausea and rectal pain.   Endocrine: Negative.    Genitourinary: Negative.    Musculoskeletal: Negative.    Skin: Negative.    Allergic/Immunologic: Negative.    Neurological: Negative.    Hematological: Negative.    Psychiatric/Behavioral: Negative.      ---------------------------------------------------------------------------------------------------------------------   Past Medical History:   Diagnosis Date   • Acute myocardial infarction (HCC)    • Adenocarcinoma (HCC)     prostate gland   • Arthritis    • Heart attack (HCC)    • Hx of radiation therapy    • Hypertension    •  Prostate cancer (HCC)      Past Surgical History:   Procedure Laterality Date   • BIOPSY PROSTATE NEEDLE / PUNCH / INCISIONAL     • CORONARY ARTERY BYPASS GRAFT     • KNEE SURGERY Right    • OTHER SURGICAL HISTORY      Coronary Artery Triple Arterial Byupass Graft   • PROSTATE SURGERY     • PROSTATECTOMY      Robotic-assisted   • SKIN CANCER EXCISION Left     removed from left ear   • VASECTOMY       Family History   Problem Relation Age of Onset   • Kidney disease Father    • Heart disease Father    • Heart attack Father    • Kidney disease Mother    • Heart disease Mother      Social History     Socioeconomic History   • Marital status:    Tobacco Use   • Smoking status: Never   • Smokeless tobacco: Never   Vaping Use   • Vaping Use: Never used   Substance and Sexual Activity   • Alcohol use: Yes     Comment: once in awhile   • Drug use: Never   • Sexual activity: Defer     ---------------------------------------------------------------------------------------------------------------------   Allergies:  Adhesive tape  ---------------------------------------------------------------------------------------------------------------------   Prior to Admission Medications     Prescriptions Last Dose Informant Patient Reported? Taking?    aspirin 81 MG tablet Past Week Spouse/Significant Other No Yes    Take 1 tablet by mouth Daily.    Cholecalciferol (Vitamin D3) 50 MCG (2000 UT) tablet Past Week Spouse/Significant Other Yes Yes    Take 2,000 Units by mouth Daily.    clotrimazole (Lotrimin AF) 1 % cream Past Week  No Yes    Apply 1 application topically to the appropriate area as directed 2 (Two) Times a Day.    cyclobenzaprine (FLEXERIL) 10 MG tablet Past Week Pharmacy Yes Yes    Take 5 mg by mouth At Night As Needed for Muscle Spasms.    Diclofenac Sodium (VOLTAREN) 1 % gel gel Past Week Pharmacy Yes Yes    Apply 2 g topically to the appropriate area as directed 4 (Four) Times a Day As Needed.    famotidine  (PEPCID) 40 MG tablet Past Week  Yes Yes    Glucosamine-Chondroitin (OSTEO BI-FLEX REGULAR STRENGTH PO) Past Week Spouse/Significant Other Yes Yes    Take  by mouth.    lisinopril (PRINIVIL,ZESTRIL) 10 MG tablet Past Week  Yes Yes    Take 1 tablet by mouth Daily.    magnesium oxide (MAG-OX) 400 MG tablet Past Week Spouse/Significant Other Yes Yes    Take 400 mg by mouth Daily.    metoprolol succinate XL (TOPROL-XL) 25 MG 24 hr tablet Past Week  No Yes    Take 1 tablet by mouth Daily.    multivitamin with minerals tablet tablet Past Week Spouse/Significant Other Yes Yes    Take 1 tablet by mouth Daily.    naproxen (NAPROSYN) 500 MG tablet Past Week  No Yes    TAKE ONE TABLET BY MOUTH TWICE A DAY WITH MEALS    polyethylene glycol (MIRALAX) 17 GM/SCOOP powder Past Week  No Yes    Take 510 g by mouth Take As Directed. Place 15 cap fulls of powder in 32 oz of liquid of choice at 4:00 and 10:00 PM    rosuvastatin (CRESTOR) 5 MG tablet Past Week Pharmacy Yes Yes    Take 5 mg by mouth Every Night.    tamsulosin (FLOMAX) 0.4 MG capsule 24 hr capsule Past Week  No Yes    Take 1 capsule by mouth Every Night.    traZODone (DESYREL) 100 MG tablet Past Week  Yes Yes    vitamin B-12 (CYANOCOBALAMIN) 1000 MCG tablet Past Week Spouse/Significant Other Yes Yes    Take 1,000 mcg by mouth Daily.        Hospital Scheduled Meds:  lactated ringers, 125 mL/hr, Intravenous, Once  sodium chloride, 10 mL, Intravenous, Q12H         ---------------------------------------------------------------------------------------------------------------------   Vital Signs:  Temp:  [97.3 °F (36.3 °C)] 97.3 °F (36.3 °C)  Heart Rate:  [67] 67  Resp:  [16] 16  BP: (173)/(85) 173/85      02/14/23  0800   Weight: 90.7 kg (200 lb)     Body mass index is 28.7 kg/m².  ---------------------------------------------------------------------------------------------------------------------   Physical Exam:  Constitutional:  Well-developed and well-nourished.  No  respiratory distress.      HENT:  Head: Normocephalic and atraumatic.  Mouth:  Moist mucous membranes.    Eyes:  Conjunctivae and EOM are normal.  Pupils are equal, round, and reactive to light.  No scleral icterus.  Neck:  Neck supple.  No JVD present.    Cardiovascular:  RRR with no murmur.  Pulmonary/Chest:  No respiratory distress, no wheezes, no crackles, with normal breath sounds and good air movement.  Abdominal:  Soft.  Bowel sounds are normal.  No distension and no tenderness.   Musculoskeletal:  No edema, no tenderness, and no deformity.  No red or swollen joints anywhere.    Neurological:  MS as above, grossly non focal exam   Skin:  Skin is warm and dry.  No rash noted.  No pallor.         Lab Results   Component Value Date    HGBA1C 5.70 (H) 10/09/2021     Lab Results   Component Value Date    TSH 1.170 10/09/2021    FREET4 1.15 04/13/2015     No results found for: PREGTESTUR, PREGSERUM, HCG, HCGQUANT  Pain Management Panel     Pain Management Panel Latest Ref Rng & Units 3/25/2015    CREATININE UR mg/dL 202.6        No results found for: BLOODCX  No results found for: URINECX  No results found for: WOUNDCX  No results found for: STOOLCX      ---------------------------------------------------------------------------------------------------------------------  Imaging Results (Last 7 Days)     ** No results found for the last 168 hours. **        I have personally reviewed the radiology images and read the final radiology report.  ---------------------------------------------------------------------------------------------------------------------  Assessment and Plan:  1. Will proceed with EGD and colonoscopy today.     Anamika Nobles, APRN  02/14/23  08:19 EST

## 2023-02-14 NOTE — ANESTHESIA PREPROCEDURE EVALUATION
Anesthesia Evaluation     Patient summary reviewed and Nursing notes reviewed   no history of anesthetic complications:  NPO Solid Status: > 8 hours  NPO Liquid Status: > 8 hours           Airway   Mallampati: I  TM distance: >3 FB  Dental          Pulmonary - negative pulmonary ROS    breath sounds clear to auscultation  Cardiovascular   Exercise tolerance: good (4-7 METS)    Rhythm: regular  Rate: normal    (+) hypertension, past MI , CABG >6 Months, dysrhythmias, hyperlipidemia,       Neuro/Psych- negative ROS  GI/Hepatic/Renal/Endo    (+)  GERD,  renal disease,     Musculoskeletal     Abdominal     Abdomen: soft.   Substance History - negative use     OB/GYN negative ob/gyn ROS         Other   arthritis,    history of cancer                    Anesthesia Plan    ASA 3     general     intravenous induction     Anesthetic plan, risks, benefits, and alternatives have been provided, discussed and informed consent has been obtained with: patient.    Use of blood products discussed with consented to blood products.   Plan discussed with CRNA.        CODE STATUS:

## 2023-02-16 LAB — REF LAB TEST METHOD: NORMAL

## 2023-02-16 NOTE — PROGRESS NOTES
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  This is a very short segment Maher's esophagus.  Please continue your Protonix.  Biopsies of the stomach were negative for H. pylori gastritis. Two polyps were removed from the colon.  1 polyp was a tubular adenoma which is considered precancerous.  The other polyp was a inflammatory polyp which is noncancerous.  Because of your age, it is likely that you will not need repeat colonoscopy in the future.

## 2023-08-29 ENCOUNTER — OFFICE VISIT (OUTPATIENT)
Dept: ORTHOPEDIC SURGERY | Facility: CLINIC | Age: 88
End: 2023-08-29
Payer: MEDICARE

## 2023-08-29 ENCOUNTER — HOSPITAL ENCOUNTER (OUTPATIENT)
Dept: GENERAL RADIOLOGY | Facility: HOSPITAL | Age: 88
Discharge: HOME | End: 2023-08-29
Admitting: PHYSICIAN ASSISTANT
Payer: MEDICARE

## 2023-08-29 VITALS — WEIGHT: 200 LBS | HEIGHT: 70 IN | BODY MASS INDEX: 28.63 KG/M2

## 2023-08-29 DIAGNOSIS — M25.511 BILATERAL SHOULDER PAIN, UNSPECIFIED CHRONICITY: Primary | ICD-10-CM

## 2023-08-29 DIAGNOSIS — M25.511 BILATERAL SHOULDER PAIN, UNSPECIFIED CHRONICITY: ICD-10-CM

## 2023-08-29 DIAGNOSIS — M25.512 BILATERAL SHOULDER PAIN, UNSPECIFIED CHRONICITY: Primary | ICD-10-CM

## 2023-08-29 DIAGNOSIS — M25.512 BILATERAL SHOULDER PAIN, UNSPECIFIED CHRONICITY: ICD-10-CM

## 2023-08-29 PROCEDURE — 73030 X-RAY EXAM OF SHOULDER: CPT

## 2023-08-29 RX ADMIN — LIDOCAINE HYDROCHLORIDE 5 ML: 10 INJECTION, SOLUTION EPIDURAL; INFILTRATION; INTRACAUDAL; PERINEURAL at 17:16

## 2023-08-29 RX ADMIN — METHYLPREDNISOLONE ACETATE 80 MG: 80 INJECTION, SUSPENSION INTRA-ARTICULAR; INTRALESIONAL; INTRAMUSCULAR; SOFT TISSUE at 17:16

## 2023-08-29 NOTE — PROGRESS NOTES
[facility] Establ[facility] Patient Visit          Patient: Angelito Carter  YOB: 1935  Date of Encounter: 8/29/2023  PCP: Daylin Redmond APRN      Subjective     Chief Complaint   Patient presents with    Right Shoulder - Pain     New-Problem      Left Shoulder - Pain     New-Problem         Order  History of Present Illness:     Angelito Carter is an 88-year-old male with notable bilateral shoulder pain.  Patient states that this been progressing over the last several years with difficulty upon overhead reaching and repetitive motion.  Patient reports no specific injury.  He has longstanding bilateral knee pain and osteoarthritis as well as lower lumbar back pain.  Patient reports no conservative treatment thus far outside of activity modification and avoidance of aggravating factors.  Patient has taken diclofenac in the past however this does not seem to correlate with any alleviation with the bilateral shoulders.  He reports popping and catching at times and difficulty sleeping on the affected side.  Patient states that the left shoulder is worse than the right. Denies any paresthesias currently.       Patient Active Problem List   Diagnosis    CA of prostate    ASCVD (arteriosclerotic cardiovascular disease)    History of ischemic cardiomyopathy    HTN (hypertension)    Dyslipidemia, goal LDL below 70    HX OF Paroxysmal a-fib    HX OF Chronic renal insufficiency    Intra-abdominal free air of unknown etiology    Gastroesophageal reflux disease without esophagitis    Encounter for screening for malignant neoplasm of colon     Past Medical History:   Diagnosis Date    Acute myocardial infarction     Adenocarcinoma     prostate gland    Arthritis     Heart attack     Hx of radiation therapy     Hypertension     Prostate cancer      Past Surgical History:   Procedure Laterality Date    BIOPSY PROSTATE NEEDLE / PUNCH / INCISIONAL      COLONOSCOPY N/A 2/14/2023    Procedure: COLONOSCOPY  FOR SCREENING;  Surgeon: Cayla Strickland MD;  Location:  [facility] OR;  Service: Gastroenterology;  Laterality: N/A;    CORONARY ARTERY BYPASS GRAFT      ENDOSCOPY N/A 2/14/2023    Procedure: ESOPHAGOGASTRODUODENOSCOPY WITH BIOPSY;  Surgeon: Cayla Strickland MD;  Location:  [facility] OR;  Service: Gastroenterology;  Laterality: N/A;    KNEE SURGERY Right     OTHER SURGICAL HISTORY      Coronary Artery Triple Arterial Byupass Graft    PROSTATE SURGERY      PROSTATECTOMY      Robotic-assisted    SKIN CANCER EXCISION Left     removed from left ear    VASECTOMY       Social History     Occupational History    Not on file   Tobacco Use    Smoking status: Never    Smokeless tobacco: Never   Vaping Use    Vaping Use: Never used   Substance and Sexual Activity    Alcohol use: Yes     Comment: once in awhile    Drug use: Never    Sexual activity: Defer    Angelito Cartre  reports that he has never smoked. He has never used smokeless tobacco.. I have educated him on the risk of diseases from using tobacco products such as cancer, COPD and heart disease.          Social History     Social History Narrative    Not on file     Family History   Problem Relation Age of Onset    Kidney disease Father     Heart disease Father     Heart attack Father     Kidney disease Mother     Heart disease Mother      Current Outpatient Medications   Medication Sig Dispense Refill    aspirin 81 MG tablet Take 1 tablet by mouth Daily. 30 tablet 11    Cholecalciferol (Vitamin D3) 50 MCG (2000 UT) tablet Take 1 tablet by mouth Daily.      clotrimazole (Lotrimin AF) 1 % cream Apply 1 application topically to the appropriate area as directed 2 (Two) Times a Day. 28 g 0    cyclobenzaprine (FLEXERIL) 10 MG tablet Take 0.5 tablets by mouth At Night As Needed for Muscle Spasms.      Diclofenac Sodium (VOLTAREN) 1 % gel gel Apply 2 g topically to the appropriate area as directed 4 (Four) Times a Day As Needed.      Glucosamine-Chondroitin (OSTEO  "BI-FLEX REGULAR STRENGTH PO) Take  by mouth.      lisinopril (PRINIVIL,ZESTRIL) 10 MG tablet Take 1 tablet by mouth Daily.      magnesium oxide (MAG-OX) 400 MG tablet Take 1 tablet by mouth Daily.      metoprolol succinate XL (TOPROL-XL) 25 MG 24 hr tablet Take 1 tablet by mouth Daily. 90 tablet 3    multivitamin with minerals tablet tablet Take 1 tablet by mouth Daily.      pantoprazole (PROTONIX) 40 MG EC tablet Take 1 tablet by mouth Daily. 90 tablet 3    rosuvastatin (CRESTOR) 5 MG tablet Take 1 tablet by mouth Every Night.      tamsulosin (FLOMAX) 0.4 MG capsule 24 hr capsule Take 1 capsule by mouth Every Night. 90 capsule 3    traZODone (DESYREL) 100 MG tablet       vitamin B-12 (CYANOCOBALAMIN) 1000 MCG tablet Take 1 tablet by mouth Daily.       No current facility-administered medications for this visit.     Allergies   Allergen Reactions    Adhesive Tape Rash            Review of Systems   Constitutional: Negative.   HENT: Negative.     Eyes: Negative.    Cardiovascular: Negative.    Respiratory: Negative.     Endocrine: Negative.    Hematologic/Lymphatic: Negative.    Skin: Negative.    Musculoskeletal:         Pertinent positives listed in HPI   Gastrointestinal: Negative.    Genitourinary: Negative.    Neurological: Negative.    Psychiatric/Behavioral: Negative.     Allergic/Immunologic: Negative.        Objective      Vitals:    08/29/23 1043   Weight: 90.7 kg (200 lb)   Height: 177.8 cm (70\")     Patient's Body mass index is 28.7 kg/m². indicating that he is obese (BMI >30). Obesity-related health conditions include the following:  Listed in PMH . Obesity is unchanged. BMI is is above average; BMI management plan is completed. We discussed portion control and increasing exercise..      Physical Exam  Vitals and nursing note reviewed.   Constitutional:       General: He is not in acute distress.     Appearance: Normal appearance. He is not ill-appearing.   HENT:      Head: Normocephalic and atraumatic. "      Right Ear: External ear normal.      Left Ear: External ear normal.      Nose: Nose normal.      Mouth/Throat:      Mouth: Mucous membranes are moist.      Pharynx: Oropharynx is clear.   Eyes:      Extraocular Movements: Extraocular movements intact.      Conjunctiva/sclera: Conjunctivae normal.      Pupils: Pupils are equal, round, and reactive to light.   Cardiovascular:      Rate and Rhythm: Normal rate.      Pulses: Normal pulses.   Pulmonary:      Effort: Pulmonary effort is normal.   Abdominal:      General: There is no distension.   Musculoskeletal:      Cervical back: Normal range of motion. No rigidity.      Comments: Bilateral shoulders on examination reveals crepitus with painful forward elevation greater than 90 degrees.  Abduction approximately 80 degrees.  Patient has internal rotation to the iliac crest bilaterally.  External rotation at side to approximately 30 degrees.  Patient has painful Jobes maneuver as well as Speed's Test.  Carlin and Neer's painful.  Crossarm testing painful bilaterally.  Intact strength upon stressing supraspinatus with empty can testing and Jobes maneuver.  Neurovascular status grossly intact bilateral upper extremities.   Skin:     General: Skin is warm and dry.      Capillary Refill: Capillary refill takes less than 2 seconds.   Neurological:      General: No focal deficit present.      Mental Status: He is alert and oriented to person, place, and time.   Psychiatric:         Mood and Affect: Mood normal.         Behavior: Behavior normal.         Radiology:      XR Shoulder 2+ View Bilateral    Result Date: 8/29/2023    No acute findings in the bilateral shoulders. Arthritic change bilaterally  This report was finalized on 8/29/2023 10:43 AM by Dr. Mac Roman MD.             Assessment/Plan        ICD-10-CM ICD-9-CM   1. Bilateral shoulder pain, unspecified chronicity  M25.511 719.41    M25.512       88-year-old male with notable bilateral glenohumeral joint  osteoarthritis and rotator cuff tendinitis/impingement.  Further discussion was had with the patient and today with the left shoulder being more symptomatic in the glenohumeral joint than the right which seems to be more subacromial in nature. He was provided today with a single injection left glenohumeral joint 80 mg Depo-Medrol with lidocaine block injected into the glenohumeral joint of the left shoulder followed by right shoulder subacromial injection.  Patient tolerated this procedure well and was instructed to return back in 4 weeks for further evaluation of the efficacy of the conservative treatment.          Large Joint Arthrocentesis: L glenohumeral  Date/Time: 8/29/2023 5:16 PM  Consent given by: patient  Site marked: site marked  Supporting Documentation  Indications: pain and diagnostic evaluation   Procedure Details  Location: shoulder - L glenohumeral  Needle size: 25 G  Approach: lateral  Medications administered: 80 mg methylPREDNISolone acetate 80 MG/ML; 5 mL lidocaine PF 1% 1 %  Patient tolerance: patient tolerated the procedure well with no immediate complications      Large Joint Arthrocentesis: R subacromial bursa  Date/Time: 8/29/2023 5:16 PM  Consent given by: patient  Site marked: site marked  Supporting Documentation  Indications: pain and diagnostic evaluation   Procedure Details  Location: shoulder - R subacromial bursa  Needle size: 25 G  Approach: lateral  Medications administered: 80 mg methylPREDNISolone acetate 80 MG/ML; 5 mL lidocaine PF 1% 1 %  Patient tolerance: patient tolerated the procedure well with no immediate complications                      This document was signed by Washington Holt PA-C August 29, 2023    CC: Daylin Redmond APRN EMR Dragon/Transcription disclaimer:  Part of this note may be completed utilizing the dragon speech recognition software. This electronic transcription/translation of spoken language to printed text may contain grammatical errors,  random word insertions, pronoun errors, and incomplete sentences or occasional consequences of the system due to software limitations, ambient noise, and hardware issues.  Any questions or concerns about the content, text, or information contained within the body of this dictation should be directly addressed to the physician for clarification.

## 2023-09-04 RX ORDER — LIDOCAINE HYDROCHLORIDE 10 MG/ML
5 INJECTION, SOLUTION EPIDURAL; INFILTRATION; INTRACAUDAL; PERINEURAL
Status: COMPLETED | OUTPATIENT
Start: 2023-08-29 | End: 2023-08-29

## 2023-09-04 RX ORDER — METHYLPREDNISOLONE ACETATE 80 MG/ML
80 INJECTION, SUSPENSION INTRA-ARTICULAR; INTRALESIONAL; INTRAMUSCULAR; SOFT TISSUE
Status: COMPLETED | OUTPATIENT
Start: 2023-08-29 | End: 2023-08-29

## 2023-09-26 ENCOUNTER — OFFICE VISIT (OUTPATIENT)
Dept: ORTHOPEDIC SURGERY | Facility: CLINIC | Age: 88
End: 2023-09-26
Payer: MEDICARE

## 2023-09-26 VITALS — WEIGHT: 199.96 LBS | HEIGHT: 70 IN | BODY MASS INDEX: 28.63 KG/M2

## 2023-09-26 DIAGNOSIS — M25.511 BILATERAL SHOULDER PAIN, UNSPECIFIED CHRONICITY: Primary | ICD-10-CM

## 2023-09-26 DIAGNOSIS — M25.512 BILATERAL SHOULDER PAIN, UNSPECIFIED CHRONICITY: Primary | ICD-10-CM

## 2023-09-26 RX ORDER — LIDOCAINE HYDROCHLORIDE 10 MG/ML
5 INJECTION, SOLUTION EPIDURAL; INFILTRATION; INTRACAUDAL; PERINEURAL
Status: COMPLETED | OUTPATIENT
Start: 2023-09-26 | End: 2023-09-26

## 2023-09-26 RX ORDER — METHYLPREDNISOLONE ACETATE 80 MG/ML
80 INJECTION, SUSPENSION INTRA-ARTICULAR; INTRALESIONAL; INTRAMUSCULAR; SOFT TISSUE
Status: COMPLETED | OUTPATIENT
Start: 2023-09-26 | End: 2023-09-26

## 2023-09-26 RX ADMIN — METHYLPREDNISOLONE ACETATE 80 MG: 80 INJECTION, SUSPENSION INTRA-ARTICULAR; INTRALESIONAL; INTRAMUSCULAR; SOFT TISSUE at 14:25

## 2023-09-26 RX ADMIN — LIDOCAINE HYDROCHLORIDE 5 ML: 10 INJECTION, SOLUTION EPIDURAL; INFILTRATION; INTRACAUDAL; PERINEURAL at 14:25

## 2023-09-26 NOTE — PROGRESS NOTES
[facility] [place] [facility] Surgery Established Patient Visit          Patient: Angelito Carter  YOB: 1935  Date of Encounter: 9/26/2023  PCP: Daylin Redmond APRN      Subjective     Chief Complaint   Patient presents with    Left Shoulder - Pain, Follow-up    Right Shoulder - Pain, Follow-up         Order  History of Present Illness:     Angelito Carter is an 88-year-old male with notable bilateral shoulder pain.  Patient received noticeable improvement in alleviation with previous subacromial injection right shoulder.  He is still doing well with this.  Patient has seen minimal and temporary alleviation with previous glenohumeral joint injection left shoulder.  The patient states that he exhibited an exacerbation and injury in which he had attempted to lift a heavy object and felt a jerking sensation into the left shoulder.  Since then he has had continued worsening and progressive pain to the lateral aspect of the shoulder radiating to the mid upper arm.  Patient with difficulty upon repetitive activities most notably in a shoulder abduction  He reports popping and catching at times and difficulty sleeping on the affected side.  Patient states that the left shoulder is worse. Denies any paresthesias currently.       Patient Active Problem List   Diagnosis    CA of prostate    ASCVD (arteriosclerotic cardiovascular disease)    History of ischemic cardiomyopathy    HTN (hypertension)    Dyslipidemia, goal LDL below 70    HX OF Paroxysmal a-fib    HX OF Chronic renal insufficiency    Intra-abdominal free air of unknown etiology    Gastroesophageal reflux disease without esophagitis    Encounter for screening for malignant neoplasm of colon     Past Medical History:   Diagnosis Date    Acute myocardial infarction     Adenocarcinoma     prostate gland    Arthritis     Heart attack     Hx of radiation therapy     Hypertension     Prostate cancer      Past Surgical History:   Procedure Laterality Date     BIOPSY PROSTATE NEEDLE / PUNCH / INCISIONAL      COLONOSCOPY N/A 2/14/2023    Procedure: COLONOSCOPY FOR SCREENING;  Surgeon: Cayla Strickland MD;  Location: [facility] OR;  Service: Gastroenterology;  Laterality: N/A;    CORONARY ARTERY BYPASS GRAFT      ENDOSCOPY N/A 2/14/2023    Procedure: ESOPHAGOGASTRODUODENOSCOPY WITH BIOPSY;  Surgeon: Cayla Strickland MD;  Location: [facility] OR;  Service: Gastroenterology;  Laterality: N/A;    KNEE SURGERY Right     OTHER SURGICAL HISTORY      Coronary Artery Triple Arterial Byupass Graft    PROSTATE SURGERY      PROSTATECTOMY      Robotic-assisted    SKIN CANCER EXCISION Left     removed from left ear    VASECTOMY       Social History     Occupational History    Not on file   Tobacco Use    Smoking status: Never    Smokeless tobacco: Never   Vaping Use    Vaping Use: Never used   Substance and Sexual Activity    Alcohol use: Yes     Comment: once in awhile    Drug use: Never    Sexual activity: Defer    Angelito Carter  reports that he has never smoked. He has never used smokeless tobacco.. I have educated him on the risk of diseases from using tobacco products such as cancer, COPD and heart disease.          Social History     Social History Narrative    Not on file     Family History   Problem Relation Age of Onset    Kidney disease Father     Heart disease Father     Heart attack Father     Kidney disease Mother     Heart disease Mother      Current Outpatient Medications   Medication Sig Dispense Refill    aspirin 81 MG tablet Take 1 tablet by mouth Daily. 30 tablet 11    Cholecalciferol (Vitamin D3) 50 MCG (2000 UT) tablet Take 1 tablet by mouth Daily.      clotrimazole (Lotrimin AF) 1 % cream Apply 1 application topically to the appropriate area as directed 2 (Two) Times a Day. 28 g 0    cyclobenzaprine (FLEXERIL) 10 MG tablet Take 0.5 tablets by mouth At Night As Needed for Muscle Spasms.      Diclofenac Sodium (VOLTAREN) 1 % gel gel Apply 2 g topically to  "the appropriate area as directed 4 (Four) Times a Day As Needed.      Glucosamine-Chondroitin (OSTEO BI-FLEX REGULAR STRENGTH PO) Take  by mouth.      lisinopril (PRINIVIL,ZESTRIL) 10 MG tablet Take 1 tablet by mouth Daily.      magnesium oxide (MAG-OX) 400 MG tablet Take 1 tablet by mouth Daily.      metoprolol succinate XL (TOPROL-XL) 25 MG 24 hr tablet Take 1 tablet by mouth Daily. 90 tablet 3    multivitamin with minerals tablet tablet Take 1 tablet by mouth Daily.      pantoprazole (PROTONIX) 40 MG EC tablet Take 1 tablet by mouth Daily. 90 tablet 3    rosuvastatin (CRESTOR) 5 MG tablet Take 1 tablet by mouth Every Night.      tamsulosin (FLOMAX) 0.4 MG capsule 24 hr capsule Take 1 capsule by mouth Every Night. 90 capsule 3    traZODone (DESYREL) 100 MG tablet       vitamin B-12 (CYANOCOBALAMIN) 1000 MCG tablet Take 1 tablet by mouth Daily.       No current facility-administered medications for this visit.     Allergies   Allergen Reactions    Adhesive Tape Rash            Review of Systems   Constitutional: Negative.   HENT: Negative.     Eyes: Negative.    Cardiovascular: Negative.    Respiratory: Negative.     Endocrine: Negative.    Hematologic/Lymphatic: Negative.    Skin: Negative.    Musculoskeletal:         Pertinent positives listed in HPI   Gastrointestinal: Negative.    Genitourinary: Negative.    Neurological: Negative.    Psychiatric/Behavioral: Negative.     Allergic/Immunologic: Negative.        Objective      Vitals:    09/26/23 1356   Weight: 90.7 kg (199 lb 15.3 oz)   Height: 177.8 cm (70\")     Patient's Body mass index is 28.69 kg/m². indicating that he is obese (BMI >30). Obesity-related health conditions include the following:  Listed in PMH . Obesity is unchanged. BMI is is above average; BMI management plan is completed. We discussed portion control and increasing exercise..      Physical Exam  Vitals and nursing note reviewed.   Constitutional:       General: He is not in acute " distress.     Appearance: Normal appearance. He is not ill-appearing.   HENT:      Head: Normocephalic and atraumatic.      Right Ear: External ear normal.      Left Ear: External ear normal.      Nose: Nose normal.      Mouth/Throat:      Mouth: Mucous membranes are moist.      Pharynx: Oropharynx is clear.   Eyes:      Extraocular Movements: Extraocular movements intact.      Conjunctiva/sclera: Conjunctivae normal.      Pupils: Pupils are equal, round, and reactive to light.   Cardiovascular:      Rate and Rhythm: Normal rate.      Pulses: Normal pulses.   Pulmonary:      Effort: Pulmonary effort is normal.   Abdominal:      General: There is no distension.   Musculoskeletal:      Cervical back: Normal range of motion. No rigidity.      Comments: Left shoulder on examination reveals crepitus with painful forward elevation greater than 90 degrees.  Abduction approximately 80 degrees.  Patient has internal rotation to the iliac crest.  External rotation at side to approximately 30 degrees. Patient has painful Jobes maneuver as well as Speed's Test.  Carlin and Neer's painful.  Crossarm testing painful.  Intact strength upon stressing supraspinatus with empty can testing and Jobes maneuver.  Neurovascular status grossly intact bilateral upper extremities.   Skin:     General: Skin is warm and dry.      Capillary Refill: Capillary refill takes less than 2 seconds.   Neurological:      General: No focal deficit present.      Mental Status: He is alert and oriented to person, place, and time.   Psychiatric:         Mood and Affect: Mood normal.         Behavior: Behavior normal.         Radiology:      XR Shoulder 2+ View Bilateral    Result Date: 8/29/2023    No acute findings in the bilateral shoulders. Arthritic change bilaterally  This report was finalized on 8/29/2023 10:43 AM by Dr. Mac Roman MD.             Assessment/Plan        ICD-10-CM ICD-9-CM   1. Bilateral shoulder pain, unspecified chronicity   M25.511 719.41    M25.512       88-year-old male with notable bilateral glenohumeral joint osteoarthritis and rotator cuff tendinitis/impingement.  Patient responded well with the previous right subacromial injection however minimally efficacious with the previous intra-articular glenohumeral joint injection.  He continues to have pain and symptoms consistent and concerning for rotator cuff pathology and questionable tear.  As result of this the patient was provided today with a subacromial left shoulder injection with 80 mg Depo-Medrol with lidocaine block injected into the subacromial space of the left shoulder.  Patient tolerated this procedure well.  We discussed potential for further diagnostic imaging if no significant alleviation and continuation of pain and symptoms.            Large Joint Arthrocentesis: L subacromial bursa  Date/Time: 9/26/2023 2:25 PM  Consent given by: patient  Site marked: site marked  Supporting Documentation  Indications: pain and diagnostic evaluation   Procedure Details  Location: shoulder - L subacromial bursa  Needle size: 25 G  Approach: lateral  Medications administered: 80 mg methylPREDNISolone acetate 80 MG/ML; 5 mL lidocaine PF 1% 1 %  Patient tolerance: patient tolerated the procedure well with no immediate complications                      This document was signed by Washington Holt PA-C September 26, 2023    CC: Daylin Redmond APRN EMR Dragon/Transcription disclaimer:  Part of this note may be completed utilizing the dragon speech recognition software. This electronic transcription/translation of spoken language to printed text may contain grammatical errors, random word insertions, pronoun errors, and incomplete sentences or occasional consequences of the system due to software limitations, ambient noise, and hardware issues.  Any questions or concerns about the content, text, or information contained within the body of this dictation should be directly  addressed to the physician for clarification.

## 2023-10-24 ENCOUNTER — OFFICE VISIT (OUTPATIENT)
Dept: ORTHOPEDIC SURGERY | Facility: CLINIC | Age: 88
End: 2023-10-24
Payer: MEDICARE

## 2023-10-24 VITALS — BODY MASS INDEX: 28.63 KG/M2 | WEIGHT: 199.96 LBS | HEIGHT: 70 IN

## 2023-10-24 DIAGNOSIS — M25.512 BILATERAL SHOULDER PAIN, UNSPECIFIED CHRONICITY: Primary | ICD-10-CM

## 2023-10-24 DIAGNOSIS — M25.511 BILATERAL SHOULDER PAIN, UNSPECIFIED CHRONICITY: Primary | ICD-10-CM

## 2023-10-24 PROCEDURE — 99213 OFFICE O/P EST LOW 20 MIN: CPT | Performed by: PHYSICIAN ASSISTANT

## 2023-10-24 PROCEDURE — 20610 DRAIN/INJ JOINT/BURSA W/O US: CPT | Performed by: PHYSICIAN ASSISTANT

## 2023-10-24 RX ORDER — LIDOCAINE HYDROCHLORIDE 10 MG/ML
5 INJECTION, SOLUTION EPIDURAL; INFILTRATION; INTRACAUDAL; PERINEURAL
Status: COMPLETED | OUTPATIENT
Start: 2023-10-24 | End: 2023-10-24

## 2023-10-24 RX ORDER — METHYLPREDNISOLONE ACETATE 80 MG/ML
80 INJECTION, SUSPENSION INTRA-ARTICULAR; INTRALESIONAL; INTRAMUSCULAR; SOFT TISSUE
Status: COMPLETED | OUTPATIENT
Start: 2023-10-24 | End: 2023-10-24

## 2023-10-24 RX ADMIN — LIDOCAINE HYDROCHLORIDE 5 ML: 10 INJECTION, SOLUTION EPIDURAL; INFILTRATION; INTRACAUDAL; PERINEURAL at 14:21

## 2023-10-24 RX ADMIN — METHYLPREDNISOLONE ACETATE 80 MG: 80 INJECTION, SUSPENSION INTRA-ARTICULAR; INTRALESIONAL; INTRAMUSCULAR; SOFT TISSUE at 14:21

## 2023-10-24 NOTE — PROGRESS NOTES
[facility] [place] [place] Patient Visit          Patient: Angelito Carter  YOB: 1935  Date of Encounter: 10/24/2023  PCP: Daylin Redmond APRN      Subjective     Chief Complaint   Patient presents with    Right Shoulder - Pain, Follow-up    Left Shoulder - Pain, Follow-up         Order  History of Present Illness:     Angelito Carter is an 88-year-old male with notable bilateral shoulder pain.  Patient received noticeable improvement in alleviation with previous subacromial injection left shoulder.  He is still doing well with this.  Patient has return of his right shoulder pain following the previous glenohumeral joint injection.  He reports worsening and progressive pain to the lateral aspect of the shoulder radiating to the mid upper arm.  Patient with difficulty upon repetitive activities most notably in a shoulder abduction  He reports popping and catching at times and difficulty sleeping on the affected side.  Patient states that the right shoulder is worse. Denies any paresthesias currently.       Patient Active Problem List   Diagnosis    CA of prostate    ASCVD (arteriosclerotic cardiovascular disease)    History of ischemic cardiomyopathy    HTN (hypertension)    Dyslipidemia, goal LDL below 70    HX OF Paroxysmal a-fib    HX OF Chronic renal insufficiency    Intra-abdominal free air of unknown etiology    Gastroesophageal reflux disease without esophagitis    Encounter for screening for malignant neoplasm of colon     Past Medical History:   Diagnosis Date    Acute myocardial infarction     Adenocarcinoma     prostate gland    Arthritis     Heart attack     Hx of radiation therapy     Hypertension     Prostate cancer      Past Surgical History:   Procedure Laterality Date    BIOPSY PROSTATE NEEDLE / PUNCH / INCISIONAL      COLONOSCOPY N/A 2/14/2023    Procedure: COLONOSCOPY FOR SCREENING;  Surgeon: Cayla Strickland MD;  Location: [facility] OR;  Service:  Gastroenterology;  Laterality: N/A;    CORONARY ARTERY BYPASS GRAFT      ENDOSCOPY N/A 2/14/2023    Procedure: ESOPHAGOGASTRODUODENOSCOPY WITH BIOPSY;  Surgeon: Cayla Strickland MD;  Location: [facility];  Service: Gastroenterology;  Laterality: N/A;    KNEE SURGERY Right     OTHER SURGICAL HISTORY      Coronary Artery Triple Arterial Byupass Graft    PROSTATE SURGERY      PROSTATECTOMY      Robotic-assisted    SKIN CANCER EXCISION Left     removed from left ear    VASECTOMY       Social History     Occupational History    Not on file   Tobacco Use    Smoking status: Never    Smokeless tobacco: Never   Vaping Use    Vaping Use: Never used   Substance and Sexual Activity    Alcohol use: Yes     Comment: once in awhile    Drug use: Never    Sexual activity: Defer    Angelito Carter  reports that he has never smoked. He has never used smokeless tobacco.. I have educated him on the risk of diseases from using tobacco products such as cancer, COPD and heart disease.          Social History     Social History Narrative    Not on file     Family History   Problem Relation Age of Onset    Kidney disease Father     Heart disease Father     Heart attack Father     Kidney disease Mother     Heart disease Mother      Current Outpatient Medications   Medication Sig Dispense Refill    aspirin 81 MG tablet Take 1 tablet by mouth Daily. 30 tablet 11    Cholecalciferol (Vitamin D3) 50 MCG (2000 UT) tablet Take 1 tablet by mouth Daily.      clotrimazole (Lotrimin AF) 1 % cream Apply 1 application topically to the appropriate area as directed 2 (Two) Times a Day. 28 g 0    cyclobenzaprine (FLEXERIL) 10 MG tablet Take 0.5 tablets by mouth At Night As Needed for Muscle Spasms.      Diclofenac Sodium (VOLTAREN) 1 % gel gel Apply 2 g topically to the appropriate area as directed 4 (Four) Times a Day As Needed.      Glucosamine-Chondroitin (OSTEO BI-FLEX REGULAR STRENGTH PO) Take  by mouth.      lisinopril (PRINIVIL,ZESTRIL) 10 MG  "tablet Take 1 tablet by mouth Daily.      magnesium oxide (MAG-OX) 400 MG tablet Take 1 tablet by mouth Daily.      metoprolol succinate XL (TOPROL-XL) 25 MG 24 hr tablet Take 1 tablet by mouth Daily. 90 tablet 3    multivitamin with minerals tablet tablet Take 1 tablet by mouth Daily.      pantoprazole (PROTONIX) 40 MG EC tablet Take 1 tablet by mouth Daily. 90 tablet 3    rosuvastatin (CRESTOR) 5 MG tablet Take 1 tablet by mouth Every Night.      tamsulosin (FLOMAX) 0.4 MG capsule 24 hr capsule Take 1 capsule by mouth Every Night. 90 capsule 3    traZODone (DESYREL) 100 MG tablet       vitamin B-12 (CYANOCOBALAMIN) 1000 MCG tablet Take 1 tablet by mouth Daily.       No current facility-administered medications for this visit.     Allergies   Allergen Reactions    Adhesive Tape Rash            Review of Systems   Constitutional: Negative.   HENT: Negative.     Eyes: Negative.    Cardiovascular: Negative.    Respiratory: Negative.     Endocrine: Negative.    Hematologic/Lymphatic: Negative.    Skin: Negative.    Musculoskeletal:         Pertinent positives listed in HPI   Gastrointestinal: Negative.    Genitourinary: Negative.    Neurological: Negative.    Psychiatric/Behavioral: Negative.     Allergic/Immunologic: Negative.          Objective      Vitals:    10/24/23 1318   Weight: 90.7 kg (199 lb 15.3 oz)   Height: 177.8 cm (70\")       Patient's Body mass index is 28.69 kg/m². indicating that he is obese (BMI >30). Obesity-related health conditions include the following:  Listed in PMH . Obesity is unchanged. BMI is is above average; BMI management plan is completed. We discussed portion control and increasing exercise..      Physical Exam  Vitals and nursing note reviewed.   Constitutional:       General: He is not in acute distress.     Appearance: Normal appearance. He is not ill-appearing.   HENT:      Head: Normocephalic and atraumatic.      Right Ear: External ear normal.      Left Ear: External ear normal. "      Nose: Nose normal.      Mouth/Throat:      Mouth: Mucous membranes are moist.      Pharynx: Oropharynx is clear.   Eyes:      Extraocular Movements: Extraocular movements intact.      Conjunctiva/sclera: Conjunctivae normal.      Pupils: Pupils are equal, round, and reactive to light.   Cardiovascular:      Rate and Rhythm: Normal rate.      Pulses: Normal pulses.   Pulmonary:      Effort: Pulmonary effort is normal.   Abdominal:      General: There is no distension.   Musculoskeletal:      Cervical back: Normal range of motion. No rigidity.      Comments: Right shoulder on examination reveals crepitus with painful forward elevation greater than 90 degrees.  Abduction approximately 80 degrees.  Patient has internal rotation to the iliac crest.  External rotation at side to approximately 30 degrees. Patient has painful Jobes maneuver as well as Speed's Test.  Carlin and Neer's painful.  Crossarm testing painful.  Weakness upon stressing supraspinatus with empty can testing and Jobes maneuver.  Neurovascular status grossly intact bilateral upper extremities.   Skin:     General: Skin is warm and dry.      Capillary Refill: Capillary refill takes less than 2 seconds.   Neurological:      General: No focal deficit present.      Mental Status: He is alert and oriented to person, place, and time.   Psychiatric:         Mood and Affect: Mood normal.         Behavior: Behavior normal.           Radiology:      No radiology results for the last 30 days.          Assessment/Plan        ICD-10-CM ICD-9-CM   1. Bilateral shoulder pain, unspecified chronicity  M25.511 719.41    M25.512         88-year-old male with notable bilateral glenohumeral joint osteoarthritis and rotator cuff tendinitis/impingement.  Patient responded well with the previous intra-articular glenohumeral joint injection. He has had return of his pain and symptoms.   He continues to have pain and symptoms consistent and concerning for rotator cuff  pathology and questionable tear. As result of this the patient was provided today with a right shoulder injection with 80 mg Depo-Medrol with lidocaine block injected into the glenohumeral space of the right shoulder.  Patient tolerated this procedure well.  We discussed potential for further diagnostic imaging if no significant alleviation and continuation of pain and symptoms.        Large Joint Arthrocentesis: R glenohumeral  Date/Time: 10/24/2023 2:21 PM  Consent given by: patient  Site marked: site marked  Supporting Documentation  Indications: pain and diagnostic evaluation   Procedure Details  Location: shoulder - R glenohumeral  Needle size: 25 G  Approach: lateral  Medications administered: 80 mg methylPREDNISolone acetate 80 MG/ML; 5 mL lidocaine PF 1% 1 %  Patient tolerance: patient tolerated the procedure well with no immediate complications                        This document was signed by Washington Holt PA-C October 24, 2023    CC: Daylin Redmond APRN EMR Dragon/Transcription disclaimer:  Part of this note may be completed utilizing the dragon speech recognition software. This electronic transcription/translation of spoken language to printed text may contain grammatical errors, random word insertions, pronoun errors, and incomplete sentences or occasional consequences of the system due to software limitations, ambient noise, and hardware issues.  Any questions or concerns about the content, text, or information contained within the body of this dictation should be directly addressed to the physician for clarification.

## 2023-11-11 ENCOUNTER — HOSPITAL ENCOUNTER (EMERGENCY)
Facility: HOSPITAL | Age: 88
Discharge: OTHER INSTITUTION | End: 2023-11-11
Attending: EMERGENCY MEDICINE
Payer: MEDICARE

## 2023-11-11 VITALS
RESPIRATION RATE: 18 BRPM | OXYGEN SATURATION: 93 % | SYSTOLIC BLOOD PRESSURE: 128 MMHG | WEIGHT: 199 LBS | TEMPERATURE: 97.2 F | BODY MASS INDEX: 28.49 KG/M2 | HEART RATE: 69 BPM | DIASTOLIC BLOOD PRESSURE: 72 MMHG | HEIGHT: 70 IN

## 2023-11-11 DIAGNOSIS — T14.90XA TRAUMA: Primary | ICD-10-CM

## 2023-11-11 DIAGNOSIS — T30.0 BURN: ICD-10-CM

## 2023-11-11 LAB
ALBUMIN SERPL-MCNC: 3.9 G/DL (ref 3.5–5.2)
ALBUMIN/GLOB SERPL: 1.7 G/DL
ALP SERPL-CCNC: 78 U/L (ref 39–117)
ALT SERPL W P-5'-P-CCNC: 15 U/L (ref 1–41)
ANION GAP SERPL CALCULATED.3IONS-SCNC: 10 MMOL/L (ref 5–15)
APTT PPP: 25.5 SECONDS (ref 26.5–34.5)
AST SERPL-CCNC: 18 U/L (ref 1–40)
BASOPHILS # BLD AUTO: 0.05 10*3/MM3 (ref 0–0.2)
BASOPHILS NFR BLD AUTO: 0.6 % (ref 0–1.5)
BILIRUB SERPL-MCNC: 0.2 MG/DL (ref 0–1.2)
BUN SERPL-MCNC: 24 MG/DL (ref 8–23)
BUN/CREAT SERPL: 16.6 (ref 7–25)
CALCIUM SPEC-SCNC: 9.2 MG/DL (ref 8.6–10.5)
CHLORIDE SERPL-SCNC: 104 MMOL/L (ref 98–107)
CO2 SERPL-SCNC: 25 MMOL/L (ref 22–29)
CREAT SERPL-MCNC: 1.45 MG/DL (ref 0.76–1.27)
CRP SERPL-MCNC: <0.3 MG/DL (ref 0–0.5)
DEPRECATED RDW RBC AUTO: 49 FL (ref 37–54)
EGFRCR SERPLBLD CKD-EPI 2021: 46.6 ML/MIN/1.73
EOSINOPHIL # BLD AUTO: 0.56 10*3/MM3 (ref 0–0.4)
EOSINOPHIL NFR BLD AUTO: 6.5 % (ref 0.3–6.2)
ERYTHROCYTE [DISTWIDTH] IN BLOOD BY AUTOMATED COUNT: 13.5 % (ref 12.3–15.4)
ERYTHROCYTE [SEDIMENTATION RATE] IN BLOOD: 6 MM/HR (ref 0–20)
FLUAV RNA RESP QL NAA+PROBE: NOT DETECTED
FLUBV RNA RESP QL NAA+PROBE: NOT DETECTED
GLOBULIN UR ELPH-MCNC: 2.3 GM/DL
GLUCOSE SERPL-MCNC: 122 MG/DL (ref 65–99)
HCT VFR BLD AUTO: 40.4 % (ref 37.5–51)
HGB BLD-MCNC: 12.7 G/DL (ref 13–17.7)
IMM GRANULOCYTES # BLD AUTO: 0.06 10*3/MM3 (ref 0–0.05)
IMM GRANULOCYTES NFR BLD AUTO: 0.7 % (ref 0–0.5)
INR PPP: 0.92 (ref 0.9–1.1)
LYMPHOCYTES # BLD AUTO: 1.38 10*3/MM3 (ref 0.7–3.1)
LYMPHOCYTES NFR BLD AUTO: 16 % (ref 19.6–45.3)
MCH RBC QN AUTO: 31 PG (ref 26.6–33)
MCHC RBC AUTO-ENTMCNC: 31.4 G/DL (ref 31.5–35.7)
MCV RBC AUTO: 98.5 FL (ref 79–97)
MONOCYTES # BLD AUTO: 0.84 10*3/MM3 (ref 0.1–0.9)
MONOCYTES NFR BLD AUTO: 9.8 % (ref 5–12)
NEUTROPHILS NFR BLD AUTO: 5.71 10*3/MM3 (ref 1.7–7)
NEUTROPHILS NFR BLD AUTO: 66.4 % (ref 42.7–76)
NRBC BLD AUTO-RTO: 0 /100 WBC (ref 0–0.2)
PLATELET # BLD AUTO: 230 10*3/MM3 (ref 140–450)
PMV BLD AUTO: 9.1 FL (ref 6–12)
POTASSIUM SERPL-SCNC: 4.5 MMOL/L (ref 3.5–5.2)
PROT SERPL-MCNC: 6.2 G/DL (ref 6–8.5)
PROTHROMBIN TIME: 12.9 SECONDS (ref 12.1–14.7)
RBC # BLD AUTO: 4.1 10*6/MM3 (ref 4.14–5.8)
SARS-COV-2 RNA RESP QL NAA+PROBE: NOT DETECTED
SODIUM SERPL-SCNC: 139 MMOL/L (ref 136–145)
WBC NRBC COR # BLD: 8.6 10*3/MM3 (ref 3.4–10.8)

## 2023-11-11 PROCEDURE — 25010000002 TETANUS-DIPHTH-ACELL PERTUSSIS 5-2.5-18.5 LF-MCG/0.5 SUSPENSION PREFILLED SYRINGE: Performed by: EMERGENCY MEDICINE

## 2023-11-11 PROCEDURE — 99284 EMERGENCY DEPT VISIT MOD MDM: CPT

## 2023-11-11 PROCEDURE — 96374 THER/PROPH/DIAG INJ IV PUSH: CPT

## 2023-11-11 PROCEDURE — 85025 COMPLETE CBC W/AUTO DIFF WBC: CPT | Performed by: EMERGENCY MEDICINE

## 2023-11-11 PROCEDURE — 25010000002 MORPHINE PER 10 MG: Performed by: EMERGENCY MEDICINE

## 2023-11-11 PROCEDURE — 90471 IMMUNIZATION ADMIN: CPT | Performed by: EMERGENCY MEDICINE

## 2023-11-11 PROCEDURE — 90715 TDAP VACCINE 7 YRS/> IM: CPT | Performed by: EMERGENCY MEDICINE

## 2023-11-11 PROCEDURE — 96375 TX/PRO/DX INJ NEW DRUG ADDON: CPT

## 2023-11-11 PROCEDURE — 85652 RBC SED RATE AUTOMATED: CPT | Performed by: EMERGENCY MEDICINE

## 2023-11-11 PROCEDURE — 87636 SARSCOV2 & INF A&B AMP PRB: CPT | Performed by: EMERGENCY MEDICINE

## 2023-11-11 PROCEDURE — 86140 C-REACTIVE PROTEIN: CPT | Performed by: EMERGENCY MEDICINE

## 2023-11-11 PROCEDURE — 85610 PROTHROMBIN TIME: CPT | Performed by: EMERGENCY MEDICINE

## 2023-11-11 PROCEDURE — 80053 COMPREHEN METABOLIC PANEL: CPT | Performed by: EMERGENCY MEDICINE

## 2023-11-11 PROCEDURE — 36415 COLL VENOUS BLD VENIPUNCTURE: CPT

## 2023-11-11 PROCEDURE — 85730 THROMBOPLASTIN TIME PARTIAL: CPT | Performed by: EMERGENCY MEDICINE

## 2023-11-11 PROCEDURE — 25010000002 ONDANSETRON PER 1 MG: Performed by: EMERGENCY MEDICINE

## 2023-11-11 RX ORDER — SODIUM CHLORIDE 0.9 % (FLUSH) 0.9 %
10 SYRINGE (ML) INJECTION AS NEEDED
Status: DISCONTINUED | OUTPATIENT
Start: 2023-11-11 | End: 2023-11-11 | Stop reason: HOSPADM

## 2023-11-11 RX ORDER — BACITRACIN ZINC 500 [USP'U]/G
1 OINTMENT TOPICAL ONCE
Status: COMPLETED | OUTPATIENT
Start: 2023-11-11 | End: 2023-11-11

## 2023-11-11 RX ORDER — ONDANSETRON 2 MG/ML
4 INJECTION INTRAMUSCULAR; INTRAVENOUS ONCE
Status: COMPLETED | OUTPATIENT
Start: 2023-11-11 | End: 2023-11-11

## 2023-11-11 RX ADMIN — TETANUS TOXOID, REDUCED DIPHTHERIA TOXOID AND ACELLULAR PERTUSSIS VACCINE, ADSORBED 0.5 ML: 5; 2.5; 8; 8; 2.5 SUSPENSION INTRAMUSCULAR at 13:04

## 2023-11-11 RX ADMIN — ONDANSETRON 4 MG: 2 INJECTION INTRAMUSCULAR; INTRAVENOUS at 13:22

## 2023-11-11 RX ADMIN — MORPHINE SULFATE 4 MG: 4 INJECTION, SOLUTION INTRAMUSCULAR; INTRAVENOUS at 13:22

## 2023-11-11 RX ADMIN — BACITRACIN 1 APPLICATION: 500 OINTMENT TOPICAL at 13:18
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
## 2023-11-11 NOTE — ED NOTES
Pt accepted by [facility] by Tiffanie Gotti, on the phone with evac at this time, doing a weather check for standby at .

## 2023-11-20 ENCOUNTER — TELEPHONE (OUTPATIENT)
Dept: ORTHOPEDIC SURGERY | Facility: CLINIC | Age: 88
End: 2023-11-20

## 2023-11-20 NOTE — TELEPHONE ENCOUNTER
Caller: CHRIS   Relationship to Patient: SELF  Phone Number: [phone]  Reason for Call: PATIENT WANTS TO CANCEL APPT FOR 11/21/23 ATTEMPTED TO WARM TRANSFER

## 2024-01-04 RX ORDER — NAPROXEN 500 MG/1
TABLET ORAL
Qty: 180 TABLET | Refills: 2 | Status: SHIPPED | OUTPATIENT
Start: 2024-01-04

## 2024-02-07 RX ORDER — PANTOPRAZOLE SODIUM 40 MG/1
40 TABLET, DELAYED RELEASE ORAL DAILY
Qty: 90 TABLET | Refills: 3 | Status: SHIPPED | OUTPATIENT
Start: 2024-02-07

## 2024-02-21 ENCOUNTER — HOSPITAL ENCOUNTER (INPATIENT)
Facility: HOSPITAL | Age: 89
LOS: 2 days | Discharge: HOME | End: 2024-02-23
Attending: STUDENT IN AN ORGANIZED HEALTH CARE EDUCATION/TRAINING PROGRAM | Admitting: INTERNAL MEDICINE
Payer: MEDICARE

## 2024-02-21 DIAGNOSIS — A09 DIARRHEA OF INFECTIOUS ORIGIN: ICD-10-CM

## 2024-02-21 DIAGNOSIS — R11.2 NAUSEA AND VOMITING, UNSPECIFIED VOMITING TYPE: ICD-10-CM

## 2024-02-21 DIAGNOSIS — N17.9 AKI (ACUTE KIDNEY INJURY): Primary | ICD-10-CM

## 2024-02-21 LAB
027 TOXIN: NORMAL
ADV 40+41 DNA STL QL NAA+NON-PROBE: NOT DETECTED
ALBUMIN SERPL-MCNC: 3.7 G/DL (ref 3.5–5.2)
ALBUMIN/GLOB SERPL: 1.1 G/DL
ALP SERPL-CCNC: 87 U/L (ref 39–117)
ALT SERPL W P-5'-P-CCNC: 26 U/L (ref 1–41)
ANION GAP SERPL CALCULATED.3IONS-SCNC: 15 MMOL/L (ref 5–15)
AST SERPL-CCNC: 30 U/L (ref 1–40)
ASTRO TYP 1-8 RNA STL QL NAA+NON-PROBE: NOT DETECTED
BILIRUB SERPL-MCNC: 0.3 MG/DL (ref 0–1.2)
BILIRUB UR QL STRIP: NEGATIVE
BUN SERPL-MCNC: 45 MG/DL (ref 8–23)
BUN/CREAT SERPL: 18.5 (ref 7–25)
C CAYETANENSIS DNA STL QL NAA+NON-PROBE: NOT DETECTED
C COLI+JEJ+UPSA DNA STL QL NAA+NON-PROBE: DETECTED
C DIFF TOX GENS STL QL NAA+PROBE: NEGATIVE
CALCIUM SPEC-SCNC: 9 MG/DL (ref 8.6–10.5)
CHLORIDE SERPL-SCNC: 108 MMOL/L (ref 98–107)
CLARITY UR: CLEAR
CO2 SERPL-SCNC: 19 MMOL/L (ref 22–29)
COLOR UR: ABNORMAL
CREAT SERPL-MCNC: 2.43 MG/DL (ref 0.76–1.27)
CRYPTOSP DNA STL QL NAA+NON-PROBE: NOT DETECTED
DEPRECATED RDW RBC AUTO: 47.2 FL (ref 37–54)
E COLI O157 DNA STL QL NAA+NON-PROBE: NOT DETECTED
E HISTOLYT DNA STL QL NAA+NON-PROBE: NOT DETECTED
EAEC PAA PLAS AGGR+AATA ST NAA+NON-PRB: NOT DETECTED
EC STX1+STX2 GENES STL QL NAA+NON-PROBE: DETECTED
EGFRCR SERPLBLD CKD-EPI 2021: 24.9 ML/MIN/1.73
ERYTHROCYTE [DISTWIDTH] IN BLOOD BY AUTOMATED COUNT: 13.2 % (ref 12.3–15.4)
ETEC LTA+ST1A+ST1B TOX ST NAA+NON-PROBE: NOT DETECTED
G LAMBLIA DNA STL QL NAA+NON-PROBE: NOT DETECTED
GLOBULIN UR ELPH-MCNC: 3.5 GM/DL
GLUCOSE SERPL-MCNC: 130 MG/DL (ref 65–99)
GLUCOSE UR STRIP-MCNC: NEGATIVE MG/DL
HCT VFR BLD AUTO: 43.3 % (ref 37.5–51)
HGB BLD-MCNC: 13.8 G/DL (ref 13–17.7)
HGB UR QL STRIP.AUTO: NEGATIVE
HOLD SPECIMEN: NORMAL
HOLD SPECIMEN: NORMAL
KETONES UR QL STRIP: NEGATIVE
LACTOFERRIN STL QL LA: POSITIVE
LEUKOCYTE ESTERASE UR QL STRIP.AUTO: NEGATIVE
LIPASE SERPL-CCNC: 10 U/L (ref 13–60)
LYMPHOCYTES # BLD MANUAL: 0.94 10*3/MM3 (ref 0.7–3.1)
LYMPHOCYTES NFR BLD MANUAL: 12 % (ref 5–12)
MCH RBC QN AUTO: 30.9 PG (ref 26.6–33)
MCHC RBC AUTO-ENTMCNC: 31.9 G/DL (ref 31.5–35.7)
MCV RBC AUTO: 97.1 FL (ref 79–97)
MONOCYTES # BLD: 0.75 10*3/MM3 (ref 0.1–0.9)
NEUTROPHILS # BLD AUTO: 4.57 10*3/MM3 (ref 1.7–7)
NEUTROPHILS NFR BLD MANUAL: 67 % (ref 42.7–76)
NEUTS BAND NFR BLD MANUAL: 6 % (ref 0–5)
NITRITE UR QL STRIP: NEGATIVE
NOROVIRUS GI+II RNA STL QL NAA+NON-PROBE: NOT DETECTED
P SHIGELLOIDES DNA STL QL NAA+NON-PROBE: NOT DETECTED
PH UR STRIP.AUTO: <=5 [PH] (ref 5–8)
PLAT MORPH BLD: NORMAL
PLATELET # BLD AUTO: 206 10*3/MM3 (ref 140–450)
PMV BLD AUTO: 9.2 FL (ref 6–12)
POTASSIUM SERPL-SCNC: 4.6 MMOL/L (ref 3.5–5.2)
PROT SERPL-MCNC: 7.2 G/DL (ref 6–8.5)
PROT UR QL STRIP: ABNORMAL
RBC # BLD AUTO: 4.46 10*6/MM3 (ref 4.14–5.8)
RBC MORPH BLD: NORMAL
RVA RNA STL QL NAA+NON-PROBE: NOT DETECTED
S ENT+BONG DNA STL QL NAA+NON-PROBE: NOT DETECTED
SAPO I+II+IV+V RNA STL QL NAA+NON-PROBE: NOT DETECTED
SHIGELLA SP+EIEC IPAH ST NAA+NON-PROBE: NOT DETECTED
SODIUM SERPL-SCNC: 142 MMOL/L (ref 136–145)
SP GR UR STRIP: 1.02 (ref 1–1.03)
TROPONIN T SERPL HS-MCNC: 38 NG/L
UROBILINOGEN UR QL STRIP: ABNORMAL
V CHOL+PARA+VUL DNA STL QL NAA+NON-PROBE: NOT DETECTED
V CHOLERAE DNA STL QL NAA+NON-PROBE: NOT DETECTED
VARIANT LYMPHS NFR BLD MANUAL: 15 % (ref 19.6–45.3)
WBC NRBC COR # BLD AUTO: 6.26 10*3/MM3 (ref 3.4–10.8)
WHOLE BLOOD HOLD COAG: NORMAL
Y ENTEROCOL DNA STL QL NAA+NON-PROBE: NOT DETECTED

## 2024-02-21 PROCEDURE — 87046 STOOL CULTR AEROBIC BACT EA: CPT | Performed by: PHYSICIAN ASSISTANT

## 2024-02-21 PROCEDURE — 36415 COLL VENOUS BLD VENIPUNCTURE: CPT

## 2024-02-21 PROCEDURE — 85025 COMPLETE CBC W/AUTO DIFF WBC: CPT | Performed by: PHYSICIAN ASSISTANT

## 2024-02-21 PROCEDURE — 87045 FECES CULTURE AEROBIC BACT: CPT | Performed by: PHYSICIAN ASSISTANT

## 2024-02-21 PROCEDURE — 25010000002 HEPARIN (PORCINE) PER 1000 UNITS: Performed by: INTERNAL MEDICINE

## 2024-02-21 PROCEDURE — 87493 C DIFF AMPLIFIED PROBE: CPT | Performed by: PHYSICIAN ASSISTANT

## 2024-02-21 PROCEDURE — 99285 EMERGENCY DEPT VISIT HI MDM: CPT

## 2024-02-21 PROCEDURE — 25810000003 SODIUM CHLORIDE 0.9 % SOLUTION: Performed by: PHYSICIAN ASSISTANT

## 2024-02-21 PROCEDURE — 87427 SHIGA-LIKE TOXIN AG IA: CPT | Performed by: PHYSICIAN ASSISTANT

## 2024-02-21 PROCEDURE — 83690 ASSAY OF LIPASE: CPT | Performed by: PHYSICIAN ASSISTANT

## 2024-02-21 PROCEDURE — 85007 BL SMEAR W/DIFF WBC COUNT: CPT | Performed by: PHYSICIAN ASSISTANT

## 2024-02-21 PROCEDURE — 25010000002 ONDANSETRON PER 1 MG: Performed by: PHYSICIAN ASSISTANT

## 2024-02-21 PROCEDURE — 93005 ELECTROCARDIOGRAM TRACING: CPT | Performed by: PHYSICIAN ASSISTANT

## 2024-02-21 PROCEDURE — 99223 1ST HOSP IP/OBS HIGH 75: CPT

## 2024-02-21 PROCEDURE — 84484 ASSAY OF TROPONIN QUANT: CPT | Performed by: PHYSICIAN ASSISTANT

## 2024-02-21 PROCEDURE — 87507 IADNA-DNA/RNA PROBE TQ 12-25: CPT | Performed by: PHYSICIAN ASSISTANT

## 2024-02-21 PROCEDURE — 93010 ELECTROCARDIOGRAM REPORT: CPT | Performed by: INTERNAL MEDICINE

## 2024-02-21 PROCEDURE — 83630 LACTOFERRIN FECAL (QUAL): CPT | Performed by: PHYSICIAN ASSISTANT

## 2024-02-21 PROCEDURE — 81003 URINALYSIS AUTO W/O SCOPE: CPT | Performed by: PHYSICIAN ASSISTANT

## 2024-02-21 PROCEDURE — 80053 COMPREHEN METABOLIC PANEL: CPT | Performed by: PHYSICIAN ASSISTANT

## 2024-02-21 RX ORDER — SODIUM CHLORIDE 9 MG/ML
125 INJECTION, SOLUTION INTRAVENOUS CONTINUOUS
Status: DISCONTINUED | OUTPATIENT
Start: 2024-02-21 | End: 2024-02-23 | Stop reason: HOSPADM

## 2024-02-21 RX ORDER — ONDANSETRON 2 MG/ML
4 INJECTION INTRAMUSCULAR; INTRAVENOUS ONCE
Status: COMPLETED | OUTPATIENT
Start: 2024-02-21 | End: 2024-02-21

## 2024-02-21 RX ORDER — ASPIRIN 81 MG/1
81 TABLET ORAL DAILY
COMMUNITY

## 2024-02-21 RX ORDER — ONDANSETRON 2 MG/ML
4 INJECTION INTRAMUSCULAR; INTRAVENOUS EVERY 6 HOURS PRN
Status: DISCONTINUED | OUTPATIENT
Start: 2024-02-21 | End: 2024-02-23 | Stop reason: HOSPADM

## 2024-02-21 RX ORDER — HEPARIN SODIUM 5000 [USP'U]/ML
5000 INJECTION, SOLUTION INTRAVENOUS; SUBCUTANEOUS EVERY 12 HOURS SCHEDULED
Status: DISCONTINUED | OUTPATIENT
Start: 2024-02-21 | End: 2024-02-23 | Stop reason: HOSPADM

## 2024-02-21 RX ORDER — SIMETHICONE 80 MG
80 TABLET,CHEWABLE ORAL DAILY PRN
COMMUNITY

## 2024-02-21 RX ORDER — BISACODYL 10 MG
10 SUPPOSITORY, RECTAL RECTAL DAILY PRN
Status: DISCONTINUED | OUTPATIENT
Start: 2024-02-21 | End: 2024-02-23 | Stop reason: HOSPADM

## 2024-02-21 RX ORDER — SODIUM CHLORIDE 0.9 % (FLUSH) 0.9 %
10 SYRINGE (ML) INJECTION EVERY 12 HOURS SCHEDULED
Status: DISCONTINUED | OUTPATIENT
Start: 2024-02-21 | End: 2024-02-23 | Stop reason: HOSPADM

## 2024-02-21 RX ORDER — POLYETHYLENE GLYCOL 3350 17 G/17G
17 POWDER, FOR SOLUTION ORAL DAILY PRN
Status: DISCONTINUED | OUTPATIENT
Start: 2024-02-21 | End: 2024-02-23 | Stop reason: HOSPADM

## 2024-02-21 RX ORDER — AMLODIPINE BESYLATE 10 MG/1
10 TABLET ORAL DAILY
COMMUNITY

## 2024-02-21 RX ORDER — AMOXICILLIN 250 MG
2 CAPSULE ORAL 2 TIMES DAILY PRN
Status: DISCONTINUED | OUTPATIENT
Start: 2024-02-21 | End: 2024-02-23 | Stop reason: HOSPADM

## 2024-02-21 RX ORDER — SODIUM CHLORIDE 0.9 % (FLUSH) 0.9 %
10 SYRINGE (ML) INJECTION AS NEEDED
Status: DISCONTINUED | OUTPATIENT
Start: 2024-02-21 | End: 2024-02-23 | Stop reason: HOSPADM

## 2024-02-21 RX ORDER — ASPIRIN 81 MG/1
81 TABLET ORAL DAILY
Status: DISCONTINUED | OUTPATIENT
Start: 2024-02-21 | End: 2024-02-23 | Stop reason: HOSPADM

## 2024-02-21 RX ORDER — LISINOPRIL 10 MG/1
10 TABLET ORAL DAILY
Status: CANCELLED | OUTPATIENT
Start: 2024-02-21

## 2024-02-21 RX ORDER — SIMETHICONE 80 MG
80 TABLET,CHEWABLE ORAL DAILY PRN
Status: DISCONTINUED | OUTPATIENT
Start: 2024-02-21 | End: 2024-02-23 | Stop reason: HOSPADM

## 2024-02-21 RX ORDER — BISACODYL 5 MG/1
5 TABLET, DELAYED RELEASE ORAL DAILY PRN
Status: DISCONTINUED | OUTPATIENT
Start: 2024-02-21 | End: 2024-02-23 | Stop reason: HOSPADM

## 2024-02-21 RX ORDER — ASPIRIN 81 MG/1
81 TABLET ORAL DAILY
Status: CANCELLED | OUTPATIENT
Start: 2024-02-21

## 2024-02-21 RX ORDER — AMLODIPINE BESYLATE 5 MG/1
10 TABLET ORAL DAILY
Status: CANCELLED | OUTPATIENT
Start: 2024-02-21

## 2024-02-21 RX ORDER — NAPROXEN 500 MG/1
500 TABLET ORAL 2 TIMES DAILY PRN
COMMUNITY
End: 2024-02-23 | Stop reason: HOSPADM

## 2024-02-21 RX ORDER — SODIUM CHLORIDE 9 MG/ML
40 INJECTION, SOLUTION INTRAVENOUS AS NEEDED
Status: DISCONTINUED | OUTPATIENT
Start: 2024-02-21 | End: 2024-02-23 | Stop reason: HOSPADM

## 2024-02-21 RX ORDER — AMLODIPINE BESYLATE 10 MG/1
10 TABLET ORAL DAILY
Status: DISCONTINUED | OUTPATIENT
Start: 2024-02-21 | End: 2024-02-23 | Stop reason: HOSPADM

## 2024-02-21 RX ORDER — SIMETHICONE 80 MG
80 TABLET,CHEWABLE ORAL DAILY PRN
Status: CANCELLED | OUTPATIENT
Start: 2024-02-21

## 2024-02-21 RX ORDER — NAPROXEN 250 MG/1
500 TABLET ORAL DAILY PRN
Status: CANCELLED | OUTPATIENT
Start: 2024-02-21

## 2024-02-21 RX ADMIN — HEPARIN SODIUM 5000 UNITS: 5000 INJECTION INTRAVENOUS; SUBCUTANEOUS at 21:22

## 2024-02-21 RX ADMIN — SODIUM CHLORIDE 125 ML/HR: 9 INJECTION, SOLUTION INTRAVENOUS at 09:47

## 2024-02-21 RX ADMIN — Medication 10 ML: at 21:18

## 2024-02-21 RX ADMIN — SODIUM CHLORIDE 500 ML: 9 INJECTION, SOLUTION INTRAVENOUS at 08:48

## 2024-02-21 RX ADMIN — ONDANSETRON 4 MG: 2 INJECTION INTRAMUSCULAR; INTRAVENOUS at 08:48

## 2024-02-21 RX ADMIN — AMLODIPINE BESYLATE 10 MG: 10 TABLET ORAL at 21:18

## 2024-02-21 RX ADMIN — ASPIRIN 81 MG: 81 TABLET, COATED ORAL at 21:18

## 2024-02-21 NOTE — ED PROVIDER NOTES
Subjective   History of Present Illness  89-year-old male presents secondary to nausea vomiting diarrhea.  Patient states this started Monday.  He states he had a temp up to around 101.  He states prior to becoming sick on Monday he had ribs and sauerkraut.  He denies any recent antibiotic use.  He states he is getting about a dozen episodes of diarrhea per day.  He states that he has been around some people with similar symptoms.  He denies any blood in his stool or in his vomit.  He denies any recent foreign travel.  No known bad food or water contact.  Patient has a history of prostate cancer, coronary artery disease status post MI, hypertension.  He presents by private vehicle.  He denies any abdominal pain however states he gets some cramping prior to having diarrhea.        Review of Systems   Constitutional: Negative.  Negative for fever.   HENT: Negative.     Respiratory: Negative.     Cardiovascular: Negative.  Negative for chest pain.   Gastrointestinal:  Positive for diarrhea, nausea and vomiting. Negative for abdominal pain.   Endocrine: Negative.    Genitourinary: Negative.  Negative for dysuria.   Skin: Negative.    Neurological: Negative.    Psychiatric/Behavioral: Negative.     All other systems reviewed and are negative.      Past Medical History:   Diagnosis Date    Acute myocardial infarction     Adenocarcinoma     prostate gland    Arthritis     Heart attack     Hx of radiation therapy     Hypertension     Prostate cancer        Allergies   Allergen Reactions    Adhesive Tape Rash       Past Surgical History:   Procedure Laterality Date    BIOPSY PROSTATE NEEDLE / PUNCH / INCISIONAL      COLONOSCOPY N/A 2/14/2023    Procedure: COLONOSCOPY FOR SCREENING;  Surgeon: Cayla Strickland MD;  Location: [facility];  Service: Gastroenterology;  Laterality: N/A;    CORONARY ARTERY BYPASS GRAFT      ENDOSCOPY N/A 2/14/2023    Procedure: ESOPHAGOGASTRODUODENOSCOPY WITH BIOPSY;  Surgeon:  Cayla Strickland MD;  Location: [facility];  Service: Gastroenterology;  Laterality: N/A;    KNEE SURGERY Right     OTHER SURGICAL HISTORY      Coronary Artery Triple Arterial Byupass Graft    PROSTATE SURGERY      PROSTATECTOMY      Robotic-assisted    SKIN CANCER EXCISION Left     removed from left ear    VASECTOMY         Family History   Problem Relation Age of Onset    Kidney disease Father     Heart disease Father     Heart attack Father     Kidney disease Mother     Heart disease Mother        Social History     Socioeconomic History    Marital status:    Tobacco Use    Smoking status: Never    Smokeless tobacco: Never   Vaping Use    Vaping Use: Never used   Substance and Sexual Activity    Alcohol use: Yes     Comment: once in awhile    Drug use: Never    Sexual activity: Defer           Objective   Physical Exam  Vitals and nursing note reviewed.   Constitutional:       General: He is not in acute distress.     Appearance: He is well-developed. He is not diaphoretic.   HENT:      Head: Normocephalic and atraumatic.      Right Ear: External ear normal.      Left Ear: External ear normal.      Nose: Nose normal.   Eyes:      Conjunctiva/sclera: Conjunctivae normal.      Pupils: Pupils are equal, round, and reactive to light.   Neck:      Vascular: No JVD.      Trachea: No tracheal deviation.   Cardiovascular:      Rate and Rhythm: Normal rate and regular rhythm.      Heart sounds: Normal heart sounds. No murmur heard.  Pulmonary:      Effort: Pulmonary effort is normal. No respiratory distress.      Breath sounds: Normal breath sounds. No wheezing.   Abdominal:      General: Bowel sounds are normal.      Palpations: Abdomen is soft.      Tenderness: There is no abdominal tenderness.   Musculoskeletal:         General: No deformity. Normal range of motion.      Cervical back: Normal range of motion and neck supple.   Skin:     General: Skin is warm and dry.      Coloration: Skin is not pale.       Findings: No erythema or rash.   Neurological:      Mental Status: He is alert and oriented to person, place, and time.      Cranial Nerves: No cranial nerve deficit.   Psychiatric:         Behavior: Behavior normal.         Thought Content: Thought content normal.         Procedures           ED Course  ED Course as of 02/21/24 1544   Wed Feb 21, 2024   0832 EKG notes sinus rhythm.  71 bpm.  Incomplete right bundle branch block noted.  No acute ST elevation.  QTc 443.  Electronically signed by Lucho Hayden DO, 02/21/24, 8:32 AM EST.   [SF]      ED Course User Index  [SF] Lucho Hayden DO                                 Results for orders placed or performed during the hospital encounter of 02/21/24   Gastrointestinal Panel, PCR - Stool, Per Rectum    Specimen: Per Rectum; Stool   Result Value Ref Range    Campylobacter Detected (A) Not Detected    Plesiomonas shigelloides Not Detected Not Detected    Salmonella Not Detected Not Detected    Vibrio Not Detected Not Detected    Vibrio cholerae Not Detected Not Detected    Yersinia enterocolitica Not Detected Not Detected    Enteroaggregative E. coli (EAEC) Not Detected Not Detected    Enterotoxigenic E. coli (ETEC) lt/st Not Detected Not Detected    Shiga-like toxin-producing E. coli (STEC) stx1/stx2 Detected (A) Not Detected    E. coli O157 Not Detected Not Detected    Shigella/Enteroinvasive E. coli (EIEC) Not Detected Not Detected    Cryptosporidium Not Detected Not Detected    Cyclospora cayetanensis Not Detected Not Detected    Entamoeba histolytica Not Detected Not Detected    Giardia lamblia Not Detected Not Detected    Adenovirus F40/41 Not Detected Not Detected    Astrovirus Not Detected Not Detected    Norovirus GI/GII Not Detected Not Detected    Rotavirus A Not Detected Not Detected    Sapovirus (I, II, IV or V) Not Detected Not Detected   Clostridioides difficile Toxin, PCR - Stool, Per Rectum    Specimen: Per Rectum; Stool   Result Value Ref  Range    Toxigenic C. difficile by PCR Negative Negative    027 Toxin Presumptive Negative    Comprehensive Metabolic Panel    Specimen: Blood   Result Value Ref Range    Glucose 130 (H) 65 - 99 mg/dL    BUN 45 (H) 8 - 23 mg/dL    Creatinine 2.43 (H) 0.76 - 1.27 mg/dL    Sodium 142 136 - 145 mmol/L    Potassium 4.6 3.5 - 5.2 mmol/L    Chloride 108 (H) 98 - 107 mmol/L    CO2 19.0 (L) 22.0 - 29.0 mmol/L    Calcium 9.0 8.6 - 10.5 mg/dL    Total Protein 7.2 6.0 - 8.5 g/dL    Albumin 3.7 3.5 - 5.2 g/dL    ALT (SGPT) 26 1 - 41 U/L    AST (SGOT) 30 1 - 40 U/L    Alkaline Phosphatase 87 39 - 117 U/L    Total Bilirubin 0.3 0.0 - 1.2 mg/dL    Globulin 3.5 gm/dL    A/G Ratio 1.1 g/dL    BUN/Creatinine Ratio 18.5 7.0 - 25.0    Anion Gap 15.0 5.0 - 15.0 mmol/L    eGFR 24.9 (L) >60.0 mL/min/1.73   Lipase    Specimen: Blood   Result Value Ref Range    Lipase 10 (L) 13 - 60 U/L   Single High Sensitivity Troponin T    Specimen: Blood   Result Value Ref Range    HS Troponin T 38 (H) <22 ng/L   CBC Auto Differential    Specimen: Blood   Result Value Ref Range    WBC 6.26 3.40 - 10.80 10*3/mm3    RBC 4.46 4.14 - 5.80 10*6/mm3    Hemoglobin 13.8 13.0 - 17.7 g/dL    Hematocrit 43.3 37.5 - 51.0 %    MCV 97.1 (H) 79.0 - 97.0 fL    MCH 30.9 26.6 - 33.0 pg    MCHC 31.9 31.5 - 35.7 g/dL    RDW 13.2 12.3 - 15.4 %    RDW-SD 47.2 37.0 - 54.0 fl    MPV 9.2 6.0 - 12.0 fL    Platelets 206 140 - 450 10*3/mm3   Fecal Lactoferrin Qual. - Stool, Per Rectum    Specimen: Per Rectum; Stool   Result Value Ref Range    Lactoferrin, Qual Positive (A) Negative   Manual Differential    Specimen: Blood   Result Value Ref Range    Neutrophil % 67.0 42.7 - 76.0 %    Lymphocyte % 15.0 (L) 19.6 - 45.3 %    Monocyte % 12.0 5.0 - 12.0 %    Bands %  6.0 (H) 0.0 - 5.0 %    Neutrophils Absolute 4.57 1.70 - 7.00 10*3/mm3    Lymphocytes Absolute 0.94 0.70 - 3.10 10*3/mm3    Monocytes Absolute 0.75 0.10 - 0.90 10*3/mm3    RBC Morphology Normal Normal    Platelet Morphology  Normal Normal   ECG 12 Lead Other; nausea   Result Value Ref Range    QT Interval 408 ms    QTC Interval 443 ms   Green Top (Gel)   Result Value Ref Range    Extra Tube Hold for add-ons.    Gold Top - SST   Result Value Ref Range    Extra Tube Hold for add-ons.    Light Blue Top   Result Value Ref Range    Extra Tube Hold for add-ons.                  Medical Decision Making  89-year-old male presents secondary to nausea vomiting diarrhea.  Patient states this started Monday.  He states he had a temp up to around 101.  He states prior to becoming sick on Monday he had ribs and sauerkraut.  He denies any recent antibiotic use.  He states he is getting about a dozen episodes of diarrhea per day.  He states that he has been around some people with similar symptoms.  He denies any blood in his stool or in his vomit.  He denies any recent foreign travel.  No known bad food or water contact.  Patient has a history of prostate cancer, coronary artery disease status post MI, hypertension.  He presents by private vehicle.  He denies any abdominal pain however states he gets some cramping prior to having diarrhea.    Amount and/or Complexity of Data Reviewed  Labs: ordered. Decision-making details documented in ED Course.  ECG/medicine tests: ordered. Decision-making details documented in ED Course.  Discussion of management or test interpretation with external provider(s): Dr. Loredo    Risk  Prescription drug management.  Decision regarding hospitalization.  Risk Details: Patient was counseled by his diagnostic room labs.  He was agreeable to treatment plan of admission to hospital for further evaluation and treatment.        Final diagnoses:   ROSIE (acute kidney injury)   Nausea and vomiting, unspecified vomiting type   Diarrhea of infectious origin       ED Disposition  ED Disposition       ED Disposition   Decision to Admit    Condition   --    Comment   Level of Care: Med/Surg [1]   Diagnosis: ROSIE (acute kidney injury)  [891911]   Certification: I Certify That Inpatient Hospital Services Are Medically Necessary For Greater Than 2 Midnights                 No follow-up provider specified.       Medication List      No changes were made to your prescriptions during this visit.            Maikol Lang PA  02/21/24 1544

## 2024-02-21 NOTE — CASE MANAGEMENT/SOCIAL WORK
Discharge Planning Assessment   [place]     Patient Name: Angelito Carter  MRN: [id number]  Today's Date: 2/21/2024    Admit Date: 2/21/2024    Plan: Spoke with patient and spouse at bedside. Patient lives at home and will return at discharge. Patient is fully independent at home he uses no DME, Oxygen or Home Health. Patient daughter Denise Munoz  [phone] is POA. [facility] Patient will drive self home when discharged.   Discharge Needs Assessment       Row Name 02/21/24 1220       Living Environment    People in Home spouse    Name(s) of People in Home Kemi Carter Spouse   [phone]    Potentially Unsafe Housing Conditions none    In the past 12 months has the electric, gas, oil, or water company threatened to shut off services in your home? No    Primary Care Provided by self;spouse/significant other    Provides Primary Care For no one    Family Caregiver if Needed spouse    Family Caregiver Names Kemi Carter Spouse   [phone]    Quality of Family Relationships helpful;involved;supportive    Able to Return to Prior Arrangements yes       Resource/Environmental Concerns    Resource/Environmental Concerns none    Transportation Concerns none       Transportation Needs    In the past 12 months, has lack of transportation kept you from medical appointments or from getting medications? no    In the past 12 months, has lack of transportation kept you from meetings, work, or from getting things needed for daily living? No       Food Insecurity    Within the past 12 months, you worried that your food would run out before you got the money to buy more. Never true    Within the past 12 months, the food you bought just didn't last and you didn't have money to get more. Never true       Transition Planning    Patient/Family Anticipates Transition to home with family    Patient/Family Anticipated Services at Transition none    Transportation Anticipated  car, drives self       Discharge Needs Assessment    Readmission Within the Last 30 Days no previous admission in last 30 days    Equipment Currently Used at Home none    Concerns to be Addressed discharge planning    Anticipated Changes Related to Illness none    Equipment Needed After Discharge none                   Discharge Plan       Row Name 02/21/24 1226       Plan    Plan Spoke with patient and spouse at bedside. Patient lives at home and will return at discharge. Patient is fully independent at home he uses no DME, Oxygen or Home Health. Patient daughter Denise Munoz  [phone] is POA. Patient PCP Barbara Redmond and pharmacy [facility]. Patient will drive self home when discharged.    Patient/Family in Agreement with Plan yes                     Leslie Proctor

## 2024-02-21 NOTE — PLAN OF CARE
Goal Outcome Evaluation:      Pt resting in bed Spouse at bedside. Alert oriented and in no distress. Will continue to monitor and follow plan of care.

## 2024-02-21 NOTE — H&P
[facility]  History & Physical    Patient Identification:  Name:  Angelito Carter  Age:  89 y.o.  Sex:  male  :  1935  MRN:  [id number]   Visit Number:  [id number]  Admit Date: 2024   Primary Care Physician:  Daylin Redmond APRN    Subjective     Chief complaint: Vomiting    History of presenting illness:      Angelito Carter is a 89 y.o. male who presented for further evaluation of N/V/D.  He was seen and examined in the ED with spouse at the bedside.  He reports 2-day history of frequent diarrhea.  Has been nonbloody.  Yesterday began to experience nausea and vomiting as well-he states has been unable to keep down much food or drink.  His daughter-in-law has been bringing him Pedialyte which she states stays down better than water.  He states his abdomen is diffusely tender.  He has had a fever up to 102 at home treated with aspirin.  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  They did not eat the same food that day.  He denies any upper respiratory symptoms.  No difficulty with urination or decreased urine output.  Has not been taking any antibiotics lately.  On further review of systems he did complain of generalized weakness which is worsening over the past 2 days.    Past medical history is significant for ASCVD, ischemic cardiomyopathy, HTN, paroxysmal A-fib, chronic renal insufficiency, GERD    Upon arrival to the ED, vital signs were temp 97.9, heart rate 75, respirations 16, /64, SpO2 94% on RA.  Baseline creatinine appears to be 1.3-1.4-was 2.43 on arrival.  Lactoferrin is positive.  C. difficile, GI PCR studies pending    Known Emergency Department medications received prior to my evaluation included Zofran, half liter normal saline bolus.   Emergency Department Room location at the time of my evaluation was [facility].      ---------------------------------------------------------------------------------------------------------------------   Review of Systems   Constitutional:  Positive for activity change, appetite change, chills, fatigue and fever.   HENT:  Negative for congestion and rhinorrhea.    Respiratory:  Negative for cough and shortness of breath.    Cardiovascular:  Negative for chest pain and palpitations.   Gastrointestinal:  Positive for abdominal pain, diarrhea, nausea and vomiting.   Genitourinary:  Negative for difficulty urinating and dysuria.   Musculoskeletal:  Negative for arthralgias and myalgias.   Skin:  Negative for rash and wound.   Neurological:  Positive for weakness. Negative for dizziness and light-headedness.        ---------------------------------------------------------------------------------------------------------------------   Past Medical History:   Diagnosis Date    Acute myocardial infarction     Adenocarcinoma     prostate gland    Arthritis     Heart attack     Hx of radiation therapy     Hypertension     Prostate cancer      Past Surgical History:   Procedure Laterality Date    BIOPSY PROSTATE NEEDLE / PUNCH / INCISIONAL      COLONOSCOPY N/A 2/14/2023    Procedure: COLONOSCOPY FOR SCREENING;  Surgeon: Cayla Strickland MD;  Location: [facility];  Service: Gastroenterology;  Laterality: N/A;    CORONARY ARTERY BYPASS GRAFT      ENDOSCOPY N/A 2/14/2023    Procedure: ESOPHAGOGASTRODUODENOSCOPY WITH BIOPSY;  Surgeon: Cayla Strickland MD;  Location: [facility] OR;  Service: Gastroenterology;  Laterality: N/A;    KNEE SURGERY Right     OTHER SURGICAL HISTORY      Coronary Artery Triple Arterial Byupass Graft    PROSTATE SURGERY      PROSTATECTOMY      Robotic-assisted    SKIN CANCER EXCISION Left     removed from left ear    VASECTOMY       Family History   Problem Relation Age of Onset    Kidney disease Father     Heart disease Father     Heart attack Father     Kidney disease  Mother     Heart disease Mother      Social History     Socioeconomic History    Marital status:    Tobacco Use    Smoking status: Never    Smokeless tobacco: Never   Vaping Use    Vaping Use: Never used   Substance and Sexual Activity    Alcohol use: Yes     Comment: once in awhile    Drug use: Never    Sexual activity: Defer     ---------------------------------------------------------------------------------------------------------------------   Allergies:  Adhesive tape  ---------------------------------------------------------------------------------------------------------------------   Home medications:    Medications below are reported home medications pulling from within the system; at this time, these medications have not been reconciled unless otherwise specified and are in the verification process for further verifcation as current home medications.  (Not in a hospital admission)      Hospital Scheduled Meds:     sodium chloride, 125 mL/hr, Last Rate: 125 mL/hr (02/21/24 3567)        Current listed hospital scheduled medications may not yet reflect those currently placed in orders that are signed and held awaiting patient's arrival to floor.   ---------------------------------------------------------------------------------------------------------------------     Objective     Vital Signs:  Temp:  [97.9 °F (36.6 °C)] 97.9 °F (36.6 °C)  Heart Rate:  [75] 75  Resp:  [16] 16  BP: (138)/(64) 138/64      02/21/24  0808   Weight: 90.7 kg (200 lb)     Body mass index is 28.7 kg/m².  ---------------------------------------------------------------------------------------------------------------------       Physical Exam  Vitals and nursing note reviewed.   Constitutional:       General: He is not in acute distress.  HENT:      Head: Normocephalic and atraumatic.   Eyes:      Extraocular Movements: Extraocular movements intact.      Conjunctiva/sclera: Conjunctivae normal.   Cardiovascular:      Rate and  "Rhythm: Normal rate and regular rhythm.   Pulmonary:      Effort: Pulmonary effort is normal.      Breath sounds: Normal breath sounds.   Abdominal:      Palpations: Abdomen is soft.      Tenderness: There is abdominal tenderness (Diffuse-worse in the lower quadrants).   Musculoskeletal:      Right lower leg: No edema.      Left lower leg: No edema.   Skin:     General: Skin is warm and dry.   Neurological:      Mental Status: He is alert. Mental status is at baseline.   Psychiatric:         Mood and Affect: Mood normal.         Behavior: Behavior normal.               ---------------------------------------------------------------------------------------------------------------------  EKG:        I have personally looked at the EKG.  ---------------------------------------------------------------------------------------------------------------------   Results from last 7 days   Lab Units 02/21/24  0830   WBC 10*3/mm3 6.26   HEMOGLOBIN g/dL 13.8   HEMATOCRIT % 43.3   MCV fL 97.1*   MCHC g/dL 31.9   PLATELETS 10*3/mm3 206         Results from last 7 days   Lab Units 02/21/24  0830   SODIUM mmol/L 142   POTASSIUM mmol/L 4.6   CHLORIDE mmol/L 108*   CO2 mmol/L 19.0*   BUN mg/dL 45*   CREATININE mg/dL 2.43*   CALCIUM mg/dL 9.0   GLUCOSE mg/dL 130*   ALBUMIN g/dL 3.7   BILIRUBIN mg/dL 0.3   ALK PHOS U/L 87   AST (SGOT) U/L 30   ALT (SGPT) U/L 26   Estimated Creatinine Clearance: 23.8 mL/min (A) (by C-G formula based on SCr of 2.43 mg/dL (H)).  No results found for: \"AMMONIA\"  Results from last 7 days   Lab Units 02/21/24  0830   HSTROP T ng/L 38*         Lab Results   Component Value Date    HGBA1C 5.70 (H) 10/09/2021     Lab Results   Component Value Date    TSH 1.170 10/09/2021    FREET4 1.15 04/13/2015     No results found for: \"PREGTESTUR\", \"PREGSERUM\", \"HCG\", \"HCGQUANT\"  Pain Management Panel  More data may exist         Latest Ref Rng & Units 11/11/2023 3/25/2015   Pain Management Panel   Creatinine, Urine mg/dL - " "202.6    Buprenorphine, Screen, Urine - Negative     -      Details          This result is from an external source.             No results found for: \"BLOODCX\"  No results found for: \"URINECX\"  No results found for: \"WOUNDCX\"  No results found for: \"STOOLCX\"      ---------------------------------------------------------------------------------------------------------------------  Imaging Results (Last 7 Days)       ** No results found for the last 168 hours. **            Cultures:  No results found for: \"BLOODCX\", \"URINECX\", \"WOUNDCX\", \"MRSACX\", \"RESPCX\", \"STOOLCX\"    Last echocardiogram:  Results for orders placed during the hospital encounter of 06/29/21    Adult Transthoracic Echo Complete W/ Cont if Necessary Per Protocol    Interpretation Summary  · Left ventricular ejection fraction appears to be 51 - 55%. Left ventricular systolic function is normal.  · Left ventricular diastolic function is consistent with (grade I) impaired relaxation.  · No significant functional valvular abnormalities noted  · There is no evidence of pericardial effusion          I have personally reviewed the above radiology images and read the final radiology report on 02/21/24  ---------------------------------------------------------------------------------------------------------------------  Assessment / Plan     There are no active hospital problems to display for this patient.      ASSESSMENT/PLAN:    Suspected viral GI illness  ROSIE on CKD, likely due to above  Admit to  Follow-up GI PCR, C. difficile studies  Supportive care for fever, nausea  Continue IV fluid replacement for now  Repeat labs in a.m.    Chronic:  ASCVD  Ischemic cardiomyopathy  HTN  Paroxysmal A-fib  CKD  GERD  Resume home regimen as indicated once med rec is complete    ----------  -Activity: As tolerated  -Expected length of stay: INPATIENT status due to the need for care which can only be reasonably provided in an hospital setting such as " aggressive/expedited ancillary services and/or consultation services, the necessity for IV medications, close physician monitoring and/or the possible need for procedures.  In such, I feel patient’s risk for adverse outcomes and need for care warrant INPATIENT evaluation and predict the patient’s care encounter to likely last beyond 2 midnights.   -Disposition pending course, anticipate return home following clinical improvement    High risk secondary to GI illness, ROSIE    There are no questions and answers to display.       Garcia Turner PA-C   02/21/24  10:38 EST

## 2024-02-21 NOTE — ED NOTES
Went to obtain pt urine. Pt states he has not been able to pee in several days due to not being able to hold anything down but he will keep trying for us.

## 2024-02-22 LAB
ANION GAP SERPL CALCULATED.3IONS-SCNC: 11.9 MMOL/L (ref 5–15)
BUN SERPL-MCNC: 45 MG/DL (ref 8–23)
BUN/CREAT SERPL: 24.1 (ref 7–25)
CALCIUM SPEC-SCNC: 9.1 MG/DL (ref 8.6–10.5)
CHLORIDE SERPL-SCNC: 106 MMOL/L (ref 98–107)
CO2 SERPL-SCNC: 22.1 MMOL/L (ref 22–29)
CREAT SERPL-MCNC: 1.87 MG/DL (ref 0.76–1.27)
DEPRECATED RDW RBC AUTO: 47.5 FL (ref 37–54)
EGFRCR SERPLBLD CKD-EPI 2021: 34.2 ML/MIN/1.73
ERYTHROCYTE [DISTWIDTH] IN BLOOD BY AUTOMATED COUNT: 13 % (ref 12.3–15.4)
GLUCOSE SERPL-MCNC: 118 MG/DL (ref 65–99)
HCT VFR BLD AUTO: 42.6 % (ref 37.5–51)
HGB BLD-MCNC: 13.4 G/DL (ref 13–17.7)
MAGNESIUM SERPL-MCNC: 1.8 MG/DL (ref 1.6–2.4)
MCH RBC QN AUTO: 31 PG (ref 26.6–33)
MCHC RBC AUTO-ENTMCNC: 31.5 G/DL (ref 31.5–35.7)
MCV RBC AUTO: 98.6 FL (ref 79–97)
PLATELET # BLD AUTO: 226 10*3/MM3 (ref 140–450)
PMV BLD AUTO: 9.2 FL (ref 6–12)
POTASSIUM SERPL-SCNC: 4.5 MMOL/L (ref 3.5–5.2)
RBC # BLD AUTO: 4.32 10*6/MM3 (ref 4.14–5.8)
SODIUM SERPL-SCNC: 140 MMOL/L (ref 136–145)
WBC NRBC COR # BLD AUTO: 5.86 10*3/MM3 (ref 3.4–10.8)

## 2024-02-22 PROCEDURE — 93010 ELECTROCARDIOGRAM REPORT: CPT | Performed by: INTERNAL MEDICINE

## 2024-02-22 PROCEDURE — 97161 PT EVAL LOW COMPLEX 20 MIN: CPT

## 2024-02-22 PROCEDURE — 80048 BASIC METABOLIC PNL TOTAL CA: CPT | Performed by: INTERNAL MEDICINE

## 2024-02-22 PROCEDURE — 83735 ASSAY OF MAGNESIUM: CPT | Performed by: INTERNAL MEDICINE

## 2024-02-22 PROCEDURE — 25810000003 SODIUM CHLORIDE 0.9 % SOLUTION: Performed by: PHYSICIAN ASSISTANT

## 2024-02-22 PROCEDURE — 99232 SBSQ HOSP IP/OBS MODERATE 35: CPT | Performed by: INTERNAL MEDICINE

## 2024-02-22 PROCEDURE — 25010000002 HEPARIN (PORCINE) PER 1000 UNITS: Performed by: INTERNAL MEDICINE

## 2024-02-22 PROCEDURE — 93005 ELECTROCARDIOGRAM TRACING: CPT | Performed by: INTERNAL MEDICINE

## 2024-02-22 PROCEDURE — 85027 COMPLETE CBC AUTOMATED: CPT | Performed by: INTERNAL MEDICINE

## 2024-02-22 RX ORDER — METOPROLOL TARTRATE 1 MG/ML
5 INJECTION, SOLUTION INTRAVENOUS
Status: DISCONTINUED | OUTPATIENT
Start: 2024-02-22 | End: 2024-02-23 | Stop reason: HOSPADM

## 2024-02-22 RX ORDER — METOPROLOL TARTRATE 1 MG/ML
5 INJECTION, SOLUTION INTRAVENOUS ONCE
Status: COMPLETED | OUTPATIENT
Start: 2024-02-22 | End: 2024-02-22

## 2024-02-22 RX ADMIN — ASPIRIN 81 MG: 81 TABLET, COATED ORAL at 08:46

## 2024-02-22 RX ADMIN — AMLODIPINE BESYLATE 10 MG: 10 TABLET ORAL at 08:46

## 2024-02-22 RX ADMIN — HEPARIN SODIUM 5000 UNITS: 5000 INJECTION INTRAVENOUS; SUBCUTANEOUS at 08:46

## 2024-02-22 RX ADMIN — HEPARIN SODIUM 5000 UNITS: 5000 INJECTION INTRAVENOUS; SUBCUTANEOUS at 20:34

## 2024-02-22 RX ADMIN — Medication 10 ML: at 08:47

## 2024-02-22 RX ADMIN — METOPROLOL TARTRATE 5 MG: 1 INJECTION, SOLUTION INTRAVENOUS at 03:46

## 2024-02-22 RX ADMIN — METOPROLOL TARTRATE 5 MG: 1 INJECTION, SOLUTION INTRAVENOUS at 05:20

## 2024-02-22 RX ADMIN — SODIUM CHLORIDE 125 ML/HR: 9 INJECTION, SOLUTION INTRAVENOUS at 02:02

## 2024-02-22 RX ADMIN — SODIUM CHLORIDE 125 ML/HR: 9 INJECTION, SOLUTION INTRAVENOUS at 18:40

## 2024-02-22 NOTE — PLAN OF CARE
Goal Outcome Evaluation:      Patient has been resting in bed this shift. Patient is currently in sinus rhythm. No s/s of acute distress noted at this time. Plan of care ongoing.

## 2024-02-22 NOTE — PAYOR COMM NOTE
"CONTACT:  PARTH PENDLETON RN  UTILIZATION MANAGEMENT DEPT.   [facility]   [address] [place], [address]   PHONE:  [phone]   FAX: [phone]         INPATIENT AUTH REQUEST      Chris Lorenzo (89 y.o. Male)       Date of Birth   1935    Social Security Number       Address   [place] [address]    Home Phone       MRN   [id number]       Sabianism   Restoration    Marital Status                               Admission Date   2/21/24    Admission Type   Emergency    Admitting Provider   Farrukh Loredo DO    Attending Provider   Farrukh Loredo DO    Department, Room/Bed   [facility] 3 SOUTH, 3316/2S       Discharge Date       Discharge Disposition       Discharge Destination                                 Attending Provider: Farrukh Loredo DO    Allergies: Adhesive Tape    Isolation: Spore   Infection: Campylobacter (02/21/24)   Code Status: CPR    Ht: 177.8 cm (70\")   Wt: 88 kg (194 lb)    Admission Cmt: None   Principal Problem: ROSIE (acute kidney injury) [N17.9]                   Active Insurance as of 2/21/2024       Primary Coverage       Payor Plan Insurance Group Employer/Plan Group    ANTHEM MEDICARE REPLACEMENT ANTHEM MEDICARE ADVANTAGE [id number]       Payor Plan Address Payor Plan Phone Number Payor Plan Fax Number Effective Dates    [address] [phone]  1/1/2024 - None Entered    [facility] [address]         Subscriber Name Subscriber Birth Date Member ID       CHRIS LORENZO 1935 [id number]                     Emergency Contacts        (Rel.) Home Phone Work Phone Mobile Phone    EmmaKemi Cool (Spouse) -- -- [phone]    JOELLE (POA)FERMIN (Daughter) [phone] -- --                 History & Physical        Garcia Turner PA-C at 02/21/24 1037       Attestation signed by Farrukh Loredo DO at 02/21/24 8920    I have reviewed this documentation and agree.       "                [facility] Medicine Services  History & Physical    Patient Identification:  Name:  Angelito Carter  Age:  89 y.o.  Sex:  male  :  1935  MRN:  [id number]   Visit Number:  [id number]  Admit Date: 2024   Primary Care Physician:  Daylin Redmond APRN    Subjective     Chief complaint: Vomiting    History of presenting illness:      Angelito Carter is a 89 y.o. male who presented for further evaluation of N/V/D.  He was seen and examined in the ED with spouse at the bedside.  He reports 2-day history of frequent diarrhea.  Has been nonbloody.  Yesterday began to experience nausea and vomiting as well-he states has been unable to keep down much food or drink.  His daughter-in-law has been bringing him Pedialyte which she states stays down better than water.  He states his abdomen is diffusely tender.  He has had a fever up to 102 at home treated with aspirin.  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  They did not eat the same food that day.  He denies any upper respiratory symptoms.  No difficulty with urination or decreased urine output.  Has not been taking any antibiotics lately.  On further review of systems he did complain of generalized weakness which is worsening over the past 2 days.    Past medical history is significant for ASCVD, ischemic cardiomyopathy, HTN, paroxysmal A-fib, chronic renal insufficiency, GERD    Upon arrival to the ED, vital signs were temp 97.9, heart rate 75, respirations 16, /64, SpO2 94% on RA.  Baseline creatinine appears to be 1.3-1.4-was 2.43 on arrival.  Lactoferrin is positive.  C. difficile, GI PCR studies pending    Known Emergency Department medications received prior to my evaluation included Zofran, half liter normal saline bolus.   Emergency Department Room location at the time of my evaluation was [facility].      ---------------------------------------------------------------------------------------------------------------------   Review of Systems   Constitutional:  Positive for activity change, appetite change, chills, fatigue and fever.   HENT:  Negative for congestion and rhinorrhea.    Respiratory:  Negative for cough and shortness of breath.    Cardiovascular:  Negative for chest pain and palpitations.   Gastrointestinal:  Positive for abdominal pain, diarrhea, nausea and vomiting.   Genitourinary:  Negative for difficulty urinating and dysuria.   Musculoskeletal:  Negative for arthralgias and myalgias.   Skin:  Negative for rash and wound.   Neurological:  Positive for weakness. Negative for dizziness and light-headedness.        ---------------------------------------------------------------------------------------------------------------------   Past Medical History:   Diagnosis Date    Acute myocardial infarction     Adenocarcinoma     prostate gland    Arthritis     Heart attack     Hx of radiation therapy     Hypertension     Prostate cancer      Past Surgical History:   Procedure Laterality Date    BIOPSY PROSTATE NEEDLE / PUNCH / INCISIONAL      COLONOSCOPY N/A 2/14/2023    Procedure: COLONOSCOPY FOR SCREENING;  Surgeon: Cayla Strickland MD;  Location: [facility];  Service: Gastroenterology;  Laterality: N/A;    CORONARY ARTERY BYPASS GRAFT      ENDOSCOPY N/A 2/14/2023    Procedure: ESOPHAGOGASTRODUODENOSCOPY WITH BIOPSY;  Surgeon: Cayla Strickland MD;  Location: [facility] OR;  Service: Gastroenterology;  Laterality: N/A;    KNEE SURGERY Right     OTHER SURGICAL HISTORY      Coronary Artery Triple Arterial Byupass Graft    PROSTATE SURGERY      PROSTATECTOMY      Robotic-assisted    SKIN CANCER EXCISION Left     removed from left ear    VASECTOMY       Family History   Problem Relation Age of Onset    Kidney disease Father     Heart disease Father     Heart attack Father     Kidney disease  Mother     Heart disease Mother      Social History     Socioeconomic History    Marital status:    Tobacco Use    Smoking status: Never    Smokeless tobacco: Never   Vaping Use    Vaping Use: Never used   Substance and Sexual Activity    Alcohol use: Yes     Comment: once in awhile    Drug use: Never    Sexual activity: Defer     ---------------------------------------------------------------------------------------------------------------------   Allergies:  Adhesive tape  ---------------------------------------------------------------------------------------------------------------------   Home medications:    Medications below are reported home medications pulling from within the system; at this time, these medications have not been reconciled unless otherwise specified and are in the verification process for further verifcation as current home medications.  (Not in a hospital admission)      Hospital Scheduled Meds:     sodium chloride, 125 mL/hr, Last Rate: 125 mL/hr (02/21/24 6167)        Current listed hospital scheduled medications may not yet reflect those currently placed in orders that are signed and held awaiting patient's arrival to floor.   ---------------------------------------------------------------------------------------------------------------------     Objective     Vital Signs:  Temp:  [97.9 °F (36.6 °C)] 97.9 °F (36.6 °C)  Heart Rate:  [75] 75  Resp:  [16] 16  BP: (138)/(64) 138/64      02/21/24  0808   Weight: 90.7 kg (200 lb)     Body mass index is 28.7 kg/m².  ---------------------------------------------------------------------------------------------------------------------       Physical Exam  Vitals and nursing note reviewed.   Constitutional:       General: He is not in acute distress.  HENT:      Head: Normocephalic and atraumatic.   Eyes:      Extraocular Movements: Extraocular movements intact.      Conjunctiva/sclera: Conjunctivae normal.   Cardiovascular:      Rate and  "Rhythm: Normal rate and regular rhythm.   Pulmonary:      Effort: Pulmonary effort is normal.      Breath sounds: Normal breath sounds.   Abdominal:      Palpations: Abdomen is soft.      Tenderness: There is abdominal tenderness (Diffuse-worse in the lower quadrants).   Musculoskeletal:      Right lower leg: No edema.      Left lower leg: No edema.   Skin:     General: Skin is warm and dry.   Neurological:      Mental Status: He is alert. Mental status is at baseline.   Psychiatric:         Mood and Affect: Mood normal.         Behavior: Behavior normal.               ---------------------------------------------------------------------------------------------------------------------  EKG:        I have personally looked at the EKG.  ---------------------------------------------------------------------------------------------------------------------   Results from last 7 days   Lab Units 02/21/24  0830   WBC 10*3/mm3 6.26   HEMOGLOBIN g/dL 13.8   HEMATOCRIT % 43.3   MCV fL 97.1*   MCHC g/dL 31.9   PLATELETS 10*3/mm3 206         Results from last 7 days   Lab Units 02/21/24  0830   SODIUM mmol/L 142   POTASSIUM mmol/L 4.6   CHLORIDE mmol/L 108*   CO2 mmol/L 19.0*   BUN mg/dL 45*   CREATININE mg/dL 2.43*   CALCIUM mg/dL 9.0   GLUCOSE mg/dL 130*   ALBUMIN g/dL 3.7   BILIRUBIN mg/dL 0.3   ALK PHOS U/L 87   AST (SGOT) U/L 30   ALT (SGPT) U/L 26   Estimated Creatinine Clearance: 23.8 mL/min (A) (by C-G formula based on SCr of 2.43 mg/dL (H)).  No results found for: \"AMMONIA\"  Results from last 7 days   Lab Units 02/21/24  0830   HSTROP T ng/L 38*         Lab Results   Component Value Date    HGBA1C 5.70 (H) 10/09/2021     Lab Results   Component Value Date    TSH 1.170 10/09/2021    FREET4 1.15 04/13/2015     No results found for: \"PREGTESTUR\", \"PREGSERUM\", \"HCG\", \"HCGQUANT\"  Pain Management Panel  More data may exist         Latest Ref Rng & Units 11/11/2023 3/25/2015   Pain Management Panel   Creatinine, Urine mg/dL - " "202.6    Buprenorphine, Screen, Urine - Negative     -      Details          This result is from an external source.             No results found for: \"BLOODCX\"  No results found for: \"URINECX\"  No results found for: \"WOUNDCX\"  No results found for: \"STOOLCX\"      ---------------------------------------------------------------------------------------------------------------------  Imaging Results (Last 7 Days)       ** No results found for the last 168 hours. **            Cultures:  No results found for: \"BLOODCX\", \"URINECX\", \"WOUNDCX\", \"MRSACX\", \"RESPCX\", \"STOOLCX\"    Last echocardiogram:  Results for orders placed during the hospital encounter of 06/29/21    Adult Transthoracic Echo Complete W/ Cont if Necessary Per Protocol    Interpretation Summary  · Left ventricular ejection fraction appears to be 51 - 55%. Left ventricular systolic function is normal.  · Left ventricular diastolic function is consistent with (grade I) impaired relaxation.  · No significant functional valvular abnormalities noted  · There is no evidence of pericardial effusion          I have personally reviewed the above radiology images and read the final radiology report on 02/21/24  ---------------------------------------------------------------------------------------------------------------------  Assessment / Plan     There are no active hospital problems to display for this patient.      ASSESSMENT/PLAN:    Suspected viral GI illness  ROSIE on CKD, likely due to above  Admit to  Follow-up GI PCR, C. difficile studies  Supportive care for fever, nausea  Continue IV fluid replacement for now  Repeat labs in a.m.    Chronic:  ASCVD  Ischemic cardiomyopathy  HTN  Paroxysmal A-fib  CKD  GERD  Resume home regimen as indicated once med rec is complete    ----------  -Activity: As tolerated  -Expected length of stay: INPATIENT status due to the need for care which can only be reasonably provided in an hospital setting such as " aggressive/expedited ancillary services and/or consultation services, the necessity for IV medications, close physician monitoring and/or the possible need for procedures.  In such, I feel patient’s risk for adverse outcomes and need for care warrant INPATIENT evaluation and predict the patient’s care encounter to likely last beyond 2 midnights.   -Disposition pending course, anticipate return home following clinical improvement    High risk secondary to GI illness, ROSIE    There are no questions and answers to display.       Garcia Turner PA-C   02/21/24  10:38 EST     Electronically signed by Farrukh Loredo DO at 02/21/24 1458          Emergency Department Notes        Sofie Aquino at 02/21/24 1328          Went to obtain pt urine. Pt states he has not been able to pee in several days due to not being able to hold anything down but he will keep trying for us.     Electronically signed by Sofie Aquino at 02/21/24 1329       Maikol Lang PA at 02/21/24 0843       Attestation signed by Lucho Hayden DO at 02/21/24 1622        SUPERVISE: For this patient encounter, I reviewed the APC's documentation, treatment plan, and medical decision making.  Lucho Hayden DO 2/21/2024 16:22 EST                         Subjective   History of Present Illness  89-year-old male presents secondary to nausea vomiting diarrhea.  Patient states this started Monday.  He states he had a temp up to around 101.  He states prior to becoming sick on Monday he had ribs and sauerkraut.  He denies any recent antibiotic use.  He states he is getting about a dozen episodes of diarrhea per day.  He states that he has been around some people with similar symptoms.  He denies any blood in his stool or in his vomit.  He denies any recent foreign travel.  No known bad food or water contact.  Patient has a history of prostate cancer, coronary artery disease status post MI, hypertension.  He presents by private vehicle.  He  denies any abdominal pain however states he gets some cramping prior to having diarrhea.        Review of Systems   Constitutional: Negative.  Negative for fever.   HENT: Negative.     Respiratory: Negative.     Cardiovascular: Negative.  Negative for chest pain.   Gastrointestinal:  Positive for diarrhea, nausea and vomiting. Negative for abdominal pain.   Endocrine: Negative.    Genitourinary: Negative.  Negative for dysuria.   Skin: Negative.    Neurological: Negative.    Psychiatric/Behavioral: Negative.     All other systems reviewed and are negative.      Past Medical History:   Diagnosis Date    Acute myocardial infarction     Adenocarcinoma     prostate gland    Arthritis     Heart attack     Hx of radiation therapy     Hypertension     Prostate cancer        Allergies   Allergen Reactions    Adhesive Tape Rash       Past Surgical History:   Procedure Laterality Date    BIOPSY PROSTATE NEEDLE / PUNCH / INCISIONAL      COLONOSCOPY N/A 2/14/2023    Procedure: COLONOSCOPY FOR SCREENING;  Surgeon: Cayla Strickland MD;  Location: [facility];  Service: Gastroenterology;  Laterality: N/A;    CORONARY ARTERY BYPASS GRAFT      ENDOSCOPY N/A 2/14/2023    Procedure: ESOPHAGOGASTRODUODENOSCOPY WITH BIOPSY;  Surgeon: Cayla Strickland MD;  Location: [facility];  Service: Gastroenterology;  Laterality: N/A;    KNEE SURGERY Right     OTHER SURGICAL HISTORY      Coronary Artery Triple Arterial Byupass Graft    PROSTATE SURGERY      PROSTATECTOMY      Robotic-assisted    SKIN CANCER EXCISION Left     removed from left ear    VASECTOMY         Family History   Problem Relation Age of Onset    Kidney disease Father     Heart disease Father     Heart attack Father     Kidney disease Mother     Heart disease Mother        Social History     Socioeconomic History    Marital status:    Tobacco Use    Smoking status: Never    Smokeless tobacco: Never   Vaping Use    Vaping Use: Never used   Substance and  Sexual Activity    Alcohol use: Yes     Comment: once in awhile    Drug use: Never    Sexual activity: Defer           Objective   Physical Exam  Vitals and nursing note reviewed.   Constitutional:       General: He is not in acute distress.     Appearance: He is well-developed. He is not diaphoretic.   HENT:      Head: Normocephalic and atraumatic.      Right Ear: External ear normal.      Left Ear: External ear normal.      Nose: Nose normal.   Eyes:      Conjunctiva/sclera: Conjunctivae normal.      Pupils: Pupils are equal, round, and reactive to light.   Neck:      Vascular: No JVD.      Trachea: No tracheal deviation.   Cardiovascular:      Rate and Rhythm: Normal rate and regular rhythm.      Heart sounds: Normal heart sounds. No murmur heard.  Pulmonary:      Effort: Pulmonary effort is normal. No respiratory distress.      Breath sounds: Normal breath sounds. No wheezing.   Abdominal:      General: Bowel sounds are normal.      Palpations: Abdomen is soft.      Tenderness: There is no abdominal tenderness.   Musculoskeletal:         General: No deformity. Normal range of motion.      Cervical back: Normal range of motion and neck supple.   Skin:     General: Skin is warm and dry.      Coloration: Skin is not pale.      Findings: No erythema or rash.   Neurological:      Mental Status: He is alert and oriented to person, place, and time.      Cranial Nerves: No cranial nerve deficit.   Psychiatric:         Behavior: Behavior normal.         Thought Content: Thought content normal.         Procedures          ED Course  ED Course as of 02/21/24 1544   Wed Feb 21, 2024   0832 EKG notes sinus rhythm.  71 bpm.  Incomplete right bundle branch block noted.  No acute ST elevation.  QTc 443.  Electronically signed by Lucho Hayden DO, 02/21/24, 8:32 AM EST.   [SF]      ED Course User Index  [SF] Lucho Hayden DO                                 Results for orders placed or performed during the hospital encounter  of 02/21/24   Gastrointestinal Panel, PCR - Stool, Per Rectum    Specimen: Per Rectum; Stool   Result Value Ref Range    Campylobacter Detected (A) Not Detected    Plesiomonas shigelloides Not Detected Not Detected    Salmonella Not Detected Not Detected    Vibrio Not Detected Not Detected    Vibrio cholerae Not Detected Not Detected    Yersinia enterocolitica Not Detected Not Detected    Enteroaggregative E. coli (EAEC) Not Detected Not Detected    Enterotoxigenic E. coli (ETEC) lt/st Not Detected Not Detected    Shiga-like toxin-producing E. coli (STEC) stx1/stx2 Detected (A) Not Detected    E. coli O157 Not Detected Not Detected    Shigella/Enteroinvasive E. coli (EIEC) Not Detected Not Detected    Cryptosporidium Not Detected Not Detected    Cyclospora cayetanensis Not Detected Not Detected    Entamoeba histolytica Not Detected Not Detected    Giardia lamblia Not Detected Not Detected    Adenovirus F40/41 Not Detected Not Detected    Astrovirus Not Detected Not Detected    Norovirus GI/GII Not Detected Not Detected    Rotavirus A Not Detected Not Detected    Sapovirus (I, II, IV or V) Not Detected Not Detected   Clostridioides difficile Toxin, PCR - Stool, Per Rectum    Specimen: Per Rectum; Stool   Result Value Ref Range    Toxigenic C. difficile by PCR Negative Negative    027 Toxin Presumptive Negative    Comprehensive Metabolic Panel    Specimen: Blood   Result Value Ref Range    Glucose 130 (H) 65 - 99 mg/dL    BUN 45 (H) 8 - 23 mg/dL    Creatinine 2.43 (H) 0.76 - 1.27 mg/dL    Sodium 142 136 - 145 mmol/L    Potassium 4.6 3.5 - 5.2 mmol/L    Chloride 108 (H) 98 - 107 mmol/L    CO2 19.0 (L) 22.0 - 29.0 mmol/L    Calcium 9.0 8.6 - 10.5 mg/dL    Total Protein 7.2 6.0 - 8.5 g/dL    Albumin 3.7 3.5 - 5.2 g/dL    ALT (SGPT) 26 1 - 41 U/L    AST (SGOT) 30 1 - 40 U/L    Alkaline Phosphatase 87 39 - 117 U/L    Total Bilirubin 0.3 0.0 - 1.2 mg/dL    Globulin 3.5 gm/dL    A/G Ratio 1.1 g/dL    BUN/Creatinine Ratio  18.5 7.0 - 25.0    Anion Gap 15.0 5.0 - 15.0 mmol/L    eGFR 24.9 (L) >60.0 mL/min/1.73   Lipase    Specimen: Blood   Result Value Ref Range    Lipase 10 (L) 13 - 60 U/L   Single High Sensitivity Troponin T    Specimen: Blood   Result Value Ref Range    HS Troponin T 38 (H) <22 ng/L   CBC Auto Differential    Specimen: Blood   Result Value Ref Range    WBC 6.26 3.40 - 10.80 10*3/mm3    RBC 4.46 4.14 - 5.80 10*6/mm3    Hemoglobin 13.8 13.0 - 17.7 g/dL    Hematocrit 43.3 37.5 - 51.0 %    MCV 97.1 (H) 79.0 - 97.0 fL    MCH 30.9 26.6 - 33.0 pg    MCHC 31.9 31.5 - 35.7 g/dL    RDW 13.2 12.3 - 15.4 %    RDW-SD 47.2 37.0 - 54.0 fl    MPV 9.2 6.0 - 12.0 fL    Platelets 206 140 - 450 10*3/mm3   Fecal Lactoferrin Qual. - Stool, Per Rectum    Specimen: Per Rectum; Stool   Result Value Ref Range    Lactoferrin, Qual Positive (A) Negative   Manual Differential    Specimen: Blood   Result Value Ref Range    Neutrophil % 67.0 42.7 - 76.0 %    Lymphocyte % 15.0 (L) 19.6 - 45.3 %    Monocyte % 12.0 5.0 - 12.0 %    Bands %  6.0 (H) 0.0 - 5.0 %    Neutrophils Absolute 4.57 1.70 - 7.00 10*3/mm3    Lymphocytes Absolute 0.94 0.70 - 3.10 10*3/mm3    Monocytes Absolute 0.75 0.10 - 0.90 10*3/mm3    RBC Morphology Normal Normal    Platelet Morphology Normal Normal   ECG 12 Lead Other; nausea   Result Value Ref Range    QT Interval 408 ms    QTC Interval 443 ms   Green Top (Gel)   Result Value Ref Range    Extra Tube Hold for add-ons.    Gold Top - SST   Result Value Ref Range    Extra Tube Hold for add-ons.    Light Blue Top   Result Value Ref Range    Extra Tube Hold for add-ons.                  Medical Decision Making  89-year-old male presents secondary to nausea vomiting diarrhea.  Patient states this started Monday.  He states he had a temp up to around 101.  He states prior to becoming sick on Monday he had nalini and maxwell.  He denies any recent antibiotic use.  He states he is getting about a dozen episodes of diarrhea per day.   He states that he has been around some people with similar symptoms.  He denies any blood in his stool or in his vomit.  He denies any recent foreign travel.  No known bad food or water contact.  Patient has a history of prostate cancer, coronary artery disease status post MI, hypertension.  He presents by private vehicle.  He denies any abdominal pain however states he gets some cramping prior to having diarrhea.    Amount and/or Complexity of Data Reviewed  Labs: ordered. Decision-making details documented in ED Course.  ECG/medicine tests: ordered. Decision-making details documented in ED Course.  Discussion of management or test interpretation with external provider(s): Dr. Loredo    Risk  Prescription drug management.  Decision regarding hospitalization.  Risk Details: Patient was counseled by his diagnostic room labs.  He was agreeable to treatment plan of admission to hospital for further evaluation and treatment.        Final diagnoses:   ROSIE (acute kidney injury)   Nausea and vomiting, unspecified vomiting type   Diarrhea of infectious origin       ED Disposition  ED Disposition       ED Disposition   Decision to Admit    Condition   --    Comment   Level of Care: Med/Surg [1]   Diagnosis: ROSIE (acute kidney injury) [235143]   Certification: I Certify That Inpatient Hospital Services Are Medically Necessary For Greater Than 2 Midnights                 No follow-up provider specified.       Medication List      No changes were made to your prescriptions during this visit.            Maikol Lang PA  02/21/24 1544      Electronically signed by Lucho Hayden DO at 02/21/24 1622       Facility-Administered Medications as of 2/22/2024   Medication Dose Route Frequency Provider Last Rate Last Admin    amLODIPine (NORVASC) tablet 10 mg  10 mg Oral Daily Farrukh Loredo DO   10 mg at 02/21/24 2118    aspirin EC tablet 81 mg  81 mg Oral Daily Farrukh Loredo DO   81 mg at 02/21/24 2118     sennosides-docusate (PERICOLACE) 8.6-50 MG per tablet 2 tablet  2 tablet Oral BID PRN Farrukh Loredo DO        And    polyethylene glycol (MIRALAX) packet 17 g  17 g Oral Daily PRN Farrukh Loredo DO        And    bisacodyl (DULCOLAX) EC tablet 5 mg  5 mg Oral Daily PRN Farrukh Loredo DO        And    bisacodyl (DULCOLAX) suppository 10 mg  10 mg Rectal Daily PRN Farrukh Loredo DO        heparin (porcine) 5000 UNIT/ML injection 5,000 Units  5,000 Units Subcutaneous Q12H Farrukh Loredo DO   5,000 Units at 02/21/24 2122    [COMPLETED] metoprolol tartrate (LOPRESSOR) injection 5 mg  5 mg Intravenous Once Zenobia Dyer DO   5 mg at 02/22/24 0346    metoprolol tartrate (LOPRESSOR) injection 5 mg  5 mg Intravenous Q5 Min PRN Zenobia Dyer DO   5 mg at 02/22/24 0520    [COMPLETED] ondansetron (ZOFRAN) injection 4 mg  4 mg Intravenous Once Maikol Lang PA   4 mg at 02/21/24 0848    ondansetron (ZOFRAN) injection 4 mg  4 mg Intravenous Q6H PRN Farrukh Loredo DO        simethicone (MYLICON) chewable tablet 80 mg  80 mg Oral Daily PRN Farrukh Loredo DO        [COMPLETED] sodium chloride 0.9 % bolus 500 mL  500 mL Intravenous Once Maikol Lang PA   Stopped at 02/21/24 0918    sodium chloride 0.9 % flush 10 mL  10 mL Intravenous PRN Maikol Lang PA        sodium chloride 0.9 % flush 10 mL  10 mL Intravenous Q12H Farrukh Loredo DO   10 mL at 02/21/24 2118    sodium chloride 0.9 % flush 10 mL  10 mL Intravenous PRN Farrukh Loredo DO        sodium chloride 0.9 % infusion 40 mL  40 mL Intravenous PRN Farrukh Loredo DO        sodium chloride 0.9 % infusion  125 mL/hr Intravenous Continuous Maikol Lang  mL/hr at 02/22/24 0202 125 mL/hr at 02/22/24 0202     Orders (all)        Start     Ordered    02/23/24 0838  Auto Discontinue in 48 Hours if not Collected  ONCE CDIFF         02/21/24 0837     02/23/24 0838  Auto Discontinue GI Panel in 48 Hours if not Collected  ONCE GI PANEL         02/21/24 0837    02/22/24 0636  Case Management  Consult  Once        Provider:  (Not yet assigned)    02/22/24 0636    02/22/24 0600  CBC (No Diff)  Morning Draw         02/21/24 1723    02/22/24 0600  Basic Metabolic Panel  Morning Draw         02/21/24 1723    02/22/24 0415  metoprolol tartrate (LOPRESSOR) injection 5 mg  Once         02/22/24 0327    02/22/24 0348  metoprolol tartrate (LOPRESSOR) injection 5 mg  Every 5 Minutes PRN         02/22/24 0348    02/22/24 0327  Magnesium  Once         02/22/24 0326    02/22/24 0223  ECG 12 Lead Rhythm Change  STAT         02/22/24 0223    02/21/24 2100  sodium chloride 0.9 % flush 10 mL  Every 12 Hours Scheduled         02/21/24 1723    02/21/24 2100  heparin (porcine) 5000 UNIT/ML injection 5,000 Units  Every 12 Hours Scheduled         02/21/24 1723    02/21/24 2000  Vital Signs  Every 4 Hours       02/21/24 1723    02/21/24 1945  amLODIPine (NORVASC) tablet 10 mg  Daily         02/21/24 1859 02/21/24 1945  aspirin EC tablet 81 mg  Daily         02/21/24 1859 02/21/24 1859  simethicone (MYLICON) chewable tablet 80 mg  Daily PRN         02/21/24 1859    02/21/24 1822  PT Consult: Eval & Treat Functional Mobility Below Baseline  Once         02/21/24 1821    02/21/24 1800  Oral Care  2 Times Daily       02/21/24 1723    02/21/24 1724  Intake & Output  Every Shift       02/21/24 1723    02/21/24 1724  Weigh Patient  Once         02/21/24 1723    02/21/24 1724  Insert Peripheral IV  Once         02/21/24 1723    02/21/24 1724  Saline Lock & Maintain IV Access  Continuous         02/21/24 1723    02/21/24 1724  Inpatient Admission  Once         02/21/24 1723    02/21/24 1724  Code Status and Medical Interventions:  Continuous         02/21/24 1723    02/21/24 1724  Diet: Diabetic Diets; Consistent Carbohydrate; Texture: Regular Texture (IDDSI 7); Fluid  Consistency: Thin (IDDSI 0)  Diet Effective Now         02/21/24 1723    02/21/24 1723  sodium chloride 0.9 % flush 10 mL  As Needed         02/21/24 1723    02/21/24 1723  sodium chloride 0.9 % infusion 40 mL  As Needed         02/21/24 1723    02/21/24 1723  ondansetron (ZOFRAN) injection 4 mg  Every 6 Hours PRN         02/21/24 1723    02/21/24 1723  sennosides-docusate (PERICOLACE) 8.6-50 MG per tablet 2 tablet  2 Times Daily PRN        See Hyperspace for full Linked Orders Report.    02/21/24 1723    02/21/24 1723  polyethylene glycol (MIRALAX) packet 17 g  Daily PRN        See Hyperspace for full Linked Orders Report.    02/21/24 1723    02/21/24 1723  bisacodyl (DULCOLAX) EC tablet 5 mg  Daily PRN        See Hyperspace for full Linked Orders Report.    02/21/24 1723    02/21/24 1723  bisacodyl (DULCOLAX) suppository 10 mg  Daily PRN        See Hyperspace for full Linked Orders Report.    02/21/24 1723    02/21/24 1518  Inpatient Admission  Once         02/21/24 1517    02/21/24 1132  Stool Culture, Targeted - Stool, Per Rectum  Once         02/21/24 1131    02/21/24 0929  sodium chloride 0.9 % infusion  Continuous         02/21/24 0913    02/21/24 0907  Manual Differential  Once         02/21/24 0906    02/21/24 0854  ondansetron (ZOFRAN) injection 4 mg  Once         02/21/24 0838    02/21/24 0853  sodium chloride 0.9 % bolus 500 mL  Once         02/21/24 0837    02/21/24 0838  Clostridioides difficile Toxin - Stool, Per Rectum  Once         02/21/24 0837    02/21/24 0838  Patient Isolation Contact Spore  Continuous        Comments: Isolation Precautions Per Clostridium Difficile (CDI) Infection Control Policy / Process    02/21/24 0837    02/21/24 0838  Gastrointestinal Panel, PCR - Stool, Per Rectum  Once         02/21/24 0837    02/21/24 0838  Fecal Lactoferrin Qual. - Stool, Per Rectum  STAT         02/21/24 0837    02/21/24 0838  Stool Culture (Reference Lab) - Stool, Per Rectum  Once         02/21/24  0837    02/21/24 0838  Clostridioides difficile Toxin, PCR - Stool, Per Rectum  PROCEDURE ONCE         02/21/24 0837    02/21/24 0836  Scan Slide  Once,   Status:  Canceled         02/21/24 0835    02/21/24 0809  CBC & Differential  Once         02/21/24 0808    02/21/24 0809  Comprehensive Metabolic Panel  Once         02/21/24 0808    02/21/24 0809  Lipase  Once         02/21/24 0808    02/21/24 0809  Shoals Draw  Once         02/21/24 0808    02/21/24 0809  Urinalysis With Microscopic If Indicated (No Culture) - Urine, Clean Catch  Once         02/21/24 0808    02/21/24 0809  Single High Sensitivity Troponin T  Once         02/21/24 0808    02/21/24 0809  ECG 12 Lead Other; nausea  Once         02/21/24 0808    02/21/24 0809  Insert Peripheral IV  Once        See Hyperspace for full Linked Orders Report.    02/21/24 0808    02/21/24 0809  CBC Auto Differential  PROCEDURE ONCE         02/21/24 0808    02/21/24 0809  Green Top (Gel)  PROCEDURE ONCE         02/21/24 0808    02/21/24 0809  Lavender Top  PROCEDURE ONCE,   Status:  Canceled         02/21/24 0808    02/21/24 0809  Gold Top - SST  PROCEDURE ONCE         02/21/24 0808    02/21/24 0809  Light Blue Top  PROCEDURE ONCE         02/21/24 0808    02/21/24 0808  sodium chloride 0.9 % flush 10 mL  As Needed        See Hyperspace for full Linked Orders Report.    02/21/24 0808    Unscheduled  Up With Assistance  As Needed       02/21/24 1723    --  amLODIPine (NORVASC) 10 MG tablet  Daily         02/21/24 1703    --  naproxen (NAPROSYN) 500 MG tablet  2 Times Daily PRN         02/21/24 1703    --  aspirin 81 MG EC tablet  Daily         02/21/24 1703    --  diphenhydrAMINE-acetaminophen (TYLENOL PM)  MG tablet per tablet  Nightly PRN         02/21/24 1703    --  simethicone (MYLICON) 80 MG chewable tablet  Daily PRN         02/21/24 1703                  Physician Progress Notes (all)    No notes of this type exist for this encounter.       Consult Notes  (all)    No notes of this type exist for this encounter.

## 2024-02-22 NOTE — THERAPY EVALUATION
Acute Care - Physical Therapy Initial Evaluation   [facility]     Patient Name: Angelito Carter  : 1935  MRN: [id number]  Today's Date: 2024   Onset of Illness/Injury or Date of Surgery: 24  Visit Dx:     ICD-10-CM ICD-9-CM   1. ROSIE (acute kidney injury)  N17.9 584.9   2. Nausea and vomiting, unspecified vomiting type  R11.2 787.01   3. Diarrhea of infectious origin  A09 009.2     Patient Active Problem List   Diagnosis    CA of prostate    ASCVD (arteriosclerotic cardiovascular disease)    History of ischemic cardiomyopathy    HTN (hypertension)    Dyslipidemia, goal LDL below 70    HX OF Paroxysmal a-fib    HX OF Chronic renal insufficiency    Intra-abdominal free air of unknown etiology    Gastroesophageal reflux disease without esophagitis    Encounter for screening for malignant neoplasm of colon    ROSIE (acute kidney injury)     Past Medical History:   Diagnosis Date    Acute myocardial infarction     Adenocarcinoma     prostate gland    Arthritis     Heart attack     Hx of radiation therapy     Hypertension     Prostate cancer      Past Surgical History:   Procedure Laterality Date    BIOPSY PROSTATE NEEDLE / PUNCH / INCISIONAL      COLONOSCOPY N/A 2023    Procedure: COLONOSCOPY FOR SCREENING;  Surgeon: Cayla Strickland MD;  Location: [facility];  Service: Gastroenterology;  Laterality: N/A;    CORONARY ARTERY BYPASS GRAFT      ENDOSCOPY N/A 2023    Procedure: ESOPHAGOGASTRODUODENOSCOPY WITH BIOPSY;  Surgeon: Cayla Strickland MD;  Location: [facility];  Service: Gastroenterology;  Laterality: N/A;    KNEE SURGERY Right     OTHER SURGICAL HISTORY      Coronary Artery Triple Arterial Byupass Graft    PROSTATE SURGERY      PROSTATECTOMY      Robotic-assisted    SKIN CANCER EXCISION Left     removed from left ear    VASECTOMY       PT Assessment (last 12 hours)       PT Evaluation and Treatment       Row Name 24 1259          Physical Therapy Time and Intention     Subjective Information complains of  diarrhea  -AG     Document Type evaluation  -AG     Mode of Treatment physical therapy  -AG     Patient Effort good  -AG     Symptoms Noted During/After Treatment none  -AG       Row Name 02/22/24 1259          General Information    Patient Profile Reviewed yes  -AG     Onset of Illness/Injury or Date of Surgery 02/21/24  -AG     Referring Physician Dr. Loredo  -AG     Patient Observations agree to therapy;cooperative;alert  -AG     Patient/Family/Caregiver Comments/Observations pt. supine in bed on room air; daughter at bedside.  Pt. pleasant, cooperative for evaluation.  -AG     General Observations of Patient pt. currently in contact spore precautions  -AG     Prior Level of Function independent:  pt. 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  -AG     Equipment Currently Used at Home --  pt. states he has a straight cane but does not use it.  -AG     Pertinent History of Current Functional Problem pt. admitted with ROSIE  -AG     Existing Precautions/Restrictions fall  -AG     Equipment Issued to Patient gait belt  -AG     Benefits Reviewed patient:;improve function;increase independence;increase strength;increase balance;increase knowledge;decrease risk of DVT  -AG     Barriers to Rehab none identified  -AG       Row Name 02/22/24 1259          Living Environment    Current Living Arrangements home  -AG     Home Accessibility --  pt. has a basement but he does not frequently use it  -AG     People in Home spouse  -AG     Primary Care Provided by self  -AG       Row Name 02/22/24 1259          Home Use of Assistive/Adaptive Equipment    Equipment Currently Used at Home none  -AG       Row Name 02/22/24 1259          Pain    Pretreatment Pain Rating 0/10 - no pain  -AG     Posttreatment Pain Rating 0/10 - no pain  -AG       Row Name 02/22/24 1259          Cognition    Affect/Mental Status (Cognition) WNL  -AG     Orientation Status (Cognition) oriented x 3   -AG     Follows Commands (Cognition) WFL  -     Personal Safety Interventions fall prevention program maintained;gait belt;nonskid shoes/slippers when out of bed;supervised activity  -AG       Row Name 02/22/24 1259          Range of Motion (ROM)    Range of Motion ROM is WFL;bilateral lower extremities;bilateral upper extremities  -[facility] Name 02/22/24 1259          Strength (Manual Muscle Testing)    Strength (Manual Muscle Testing) strength is WFL;bilateral lower extremities  -[facility] Name 02/22/24 1259          Sensory    Hearing Status WFL  -[facility] Name 02/22/24 1259          Vision Assessment/Intervention    Visual Impairment/Limitations WFL  -AG       Row Name 02/22/24 1259          Sensory Assessment (Somatosensory)    Sensory Assessment (Somatosensory) LE sensation intact  -[facility] Name 02/22/24 1259          Mobility    Extremity Weight-bearing Status --  no restrictions  -[facility] Name 02/22/24 1259          Bed Mobility    Bed Mobility bed mobility (all) activities  -AG     All Activities, Holiday (Bed Mobility) standby assist  -AG     Assistive Device (Bed Mobility) bed rails  -[facility] Name 02/22/24 1259          Gait/Stairs (Locomotion)    Holiday Level (Gait) standby assist  -AG     Assistive Device (Gait) --  none  -AG     Patient was able to Ambulate yes  -AG     Distance in Feet (Gait) 40  -AG     Pattern (Gait) step-through  -AG       [facility] Name 02/22/24 1259          Safety Issues, Functional Mobility    Impairments Affecting Function (Mobility) balance;endurance/activity tolerance  -[facility] Name 02/22/24 1259          Balance    Balance Assessment sitting static balance;sitting dynamic balance;sit to stand dynamic balance;standing static balance;standing dynamic balance  -AG     Static Sitting Balance independent  -AG     Position, Sitting Balance unsupported;sitting edge of bed  -AG     Static Standing Balance standby assist  -AG     Dynamic  Standing Balance standby assist  -AG     Position/Device Used, Standing Balance --  none  -AG       Row Name 02/22/24 125          Coping    Observed Emotional State calm;cooperative;pleasant  -AG     Verbalized Emotional State acceptance  -AG     Trust Relationship/Rapport care explained;choices provided;thoughts/feelings acknowledged  -AG     Family/Support Persons spouse  -AG     Involvement in Care not present at bedside  -AG     Family/Support System Care self-care encouraged;support provided  -AG       Row Name 02/22/24 125          Plan of Care Review    Plan of Care Reviewed With patient  -AG     Outcome Evaluation PT evaluation complete.  SBA for functional mobility skills and ambulation about room.  Pt. with mildly decr balance with turns while ambulating.  PT will place on caseload and continue to follow.  -       Row Name 02/22/24 1250          Positioning and Restraints    Pre-Treatment Position in bed  -AG     Post Treatment Position bed  -AG     In Bed supine;call light within reach;encouraged to call for assist;side rails up x3  -       Row Name 02/22/24 7555          Therapy Assessment/Plan (PT)    Patient/Family Therapy Goals Statement (PT) return home to [facility]  -     Functional Level at Time of Evaluation (PT) SBA  -AG     PT Diagnosis (PT) decr balance and gait safety  -AG     Rehab Potential (PT) good, to achieve stated therapy goals  -     Criteria for Skilled Interventions Met (PT) yes;meets criteria  -AG     Therapy Frequency (PT) 2 times/wk  2-5 times/ week per priority  -AG     Predicted Duration of Therapy Intervention (PT) LOS  -AG     Problem List (PT) balance;problems related to;mobility  -AG     Activity Limitations Related to Problem List (PT) unable to ambulate safely;unable to transfer safely;BADLs not performed adequately or safely  -AG       Row Name 02/22/24 1258          Therapy Plan Review/Discharge Plan (PT)    Therapy Plan Review (PT) evaluation/treatment results  reviewed;care plan/treatment goals reviewed;risks/benefits reviewed;current/potential barriers reviewed;participants voiced agreement with care plan;participants included;patient  -AG       Row Name 02/22/24 1259          Physical Therapy Goals    Transfer Goal Selection (PT) transfer, PT goal 1  -AG     Gait Training Goal Selection (PT) gait training, PT goal 1  -AG       Row Name 02/22/24 1259          Transfer Goal 1 (PT)    Activity/Assistive Device (Transfer Goal 1, PT) sit-to-stand/stand-to-sit;bed-to-chair/chair-to-bed;toilet  -AG     [place] Level/Cues Needed (Transfer Goal 1, PT) modified independence  -AG     Time Frame (Transfer Goal 1, PT) by discharge  -AG       Row Name 02/22/24 1257          Gait Training Goal 1 (PT)    Activity/Assistive Device (Gait Training Goal 1, PT) gait (walking locomotion);decrease fall risk;diminish gait deviation;improve balance and speed;increase endurance/gait distance  no assistive device  -AG     [place] Level (Gait Training Goal 1, PT) modified independence  -AG     Distance (Gait Training Goal 1, PT) 150  -AG     Time Frame (Gait Training Goal 1, PT) by discharge  -AG               User Key  (r) = Recorded By, (t) = Taken By, (c) = Cosigned By      Initials Name Provider Type    Marge Allen PT Physical Therapist                    Physical Therapy Education       Title: PT OT SLP Therapies (Done)       Topic: Physical Therapy (Done)       Point: Mobility training (Done)       Learning Progress Summary             Patient Acceptance, E,D, VU,NR by  at 2/22/2024 1259                         Point: Home exercise program (Done)       Learning Progress Summary             Patient Acceptance, E,D, VU,NR by  at 2/22/2024 1259                         Point: Body mechanics (Done)       Learning Progress Summary             Patient Acceptance, E,D, VU,NR by  at 2/22/2024 1259                         Point: Precautions (Done)       Learning Progress Summary              Patient Acceptance, E,D, VU,NR by  at 2/22/2024 1259                                         User Key       Initials Effective Dates Name Provider Type Discipline     06/16/21 -  Marge Fang, PT Physical Therapist PT                  PT Recommendation and Plan  Anticipated Discharge Disposition (PT): home  Planned Therapy Interventions (PT): balance training, bed mobility training, gait training, home exercise program, transfer training, patient/family education  Therapy Frequency (PT): 2 times/wk (2-5 times/ week per priority)  Plan of Care Reviewed With: patient  Outcome Evaluation: PT evaluation complete.  SBA for functional mobility skills and ambulation about room.  Pt. with mildly decr balance with turns while ambulating.  PT will place on caseload and continue to follow.       Time Calculation:    PT Charges       Row Name 02/22/24 1257             Time Calculation    PT Received On 02/22/24  -      PT Goal Re-Cert Due Date 03/07/24  -                User Key  (r) = Recorded By, (t) = Taken By, (c) = Cosigned By      Initials Name Provider Type     Marge Fang, PT Physical Therapist                  Therapy Charges for Today       Code Description Service Date Service Provider Modifiers Qty    73101744098 HC PT EVAL LOW COMPLEXITY 4 2/22/2024 Marge Fang, PT GP 1            PT G-Codes  AM-PAC 6 Clicks Score (PT): 22    Marge Fang PT  2/22/2024

## 2024-02-22 NOTE — CASE MANAGEMENT/SOCIAL WORK
Discharge Planning Assessment   [facility]     Patient Name: Angelito Carter  MRN: [id number]  Today's Date: 2/22/2024    Admit Date: 2/21/2024    Plan: SS received a consult for advanced age & discharge planning. SS spoke with pt and spouse on this date. Pt lives with spouse. PCP is Velia Redmond. Pt does not utilize home health services or DME. Pt's spouse states pt has both POA and Living will (not on file). SS encouraged pt's spouse to bring copy to put in pt's chart. Pt plans to return home at discharge with spouse to provide transportation on date of discharge. SS to follow and assist with discharge planning.   Discharge Needs Assessment       Row Name 02/22/24 1329       Living Environment    People in Home spouse    Name(s) of People in Home Spouse, Kemi    Current Living Arrangements home    Potentially Unsafe Housing Conditions none    Primary Care Provided by self    Provides Primary Care For no one    Family Caregiver if Needed spouse    Family Caregiver Names Spouse, Kemi.    Quality of Family Relationships helpful;involved;supportive    Able to Return to Prior Arrangements yes       Resource/Environmental Concerns    Resource/Environmental Concerns none    Transportation Concerns none       Transition Planning    Patient/Family Anticipates Transition to home with family    Patient/Family Anticipated Services at Transition none    Transportation Anticipated family or friend will provide       Discharge Needs Assessment    Equipment Currently Used at Home none    Anticipated Changes Related to Illness none    Equipment Needed After Discharge none           Discharge Plan       Row Name 02/22/24 1330       Plan    Plan SS received a consult for advanced age & discharge planning. SS spoke with pt and spouse on this date. Pt lives with spouse. PCP is Velia Redmond. Pt does not utilize home health services or DME. Pt's spouse states pt has both POA and Living will (not on file). SS encouraged pt's spouse to  bring copy to put in pt's chart. Pt plans to return home at discharge with spouse to provide transportation on date of discharge. SS to follow and assist with discharge planning.    Patient/Family in Agreement with Plan yes        Continued Care and Services - Admitted Since 2/21/2024    Coordination has not been started for this encounter.       Expected Discharge Date and Time       Expected Discharge Date Expected Discharge Time    Feb 25, 2024            Demographic Summary       Row Name 02/22/24 1328       General Information    Admission Type inpatient    Referral Source nursing    Reason for Consult discharge planning  SS received a consult for advanced age & discharge planning.                OMAR Hernandez

## 2024-02-22 NOTE — PROGRESS NOTES
[facility] HOSPITALIST PROGRESS NOTE     Patient Identification:  Name:  Angelito Carter  Age:  89 y.o.  Sex:  male  :  1935  MRN:  [id number]  Visit Number:  [id number]  Primary Care Provider:  Daylin Redmond APRN    Length of stay:  1    Chief complaint: Diarrhea    Subjective:    Patient seen and examined at bedside with patient's daughter present.  Patient continues to report multiple episodes of diarrhea over the past 24 hours.  He does report the frequency has decreased slightly but he had approximately 5 bowel movements overnight.  He denies any fevers, chills, sweats or rigors.  No new events overnight.  ----------------------------------------------------------------------------------------------------------------------  Current Hospital Meds:  amLODIPine, 10 mg, Oral, Daily  aspirin, 81 mg, Oral, Daily  heparin (porcine), 5,000 Units, Subcutaneous, Q12H  sodium chloride, 10 mL, Intravenous, Q12H      sodium chloride, 125 mL/hr, Last Rate: 125 mL/hr (24 020)      ----------------------------------------------------------------------------------------------------------------------  Vital Signs:  Temp:  [97.8 °F (36.6 °C)-98.3 °F (36.8 °C)] 97.8 °F (36.6 °C)  Heart Rate:  [] 76  Resp:  [18-20] 18  BP: (115-167)/(52-83) 129/69      24  0808 24  0520   Weight: 90.7 kg (200 lb) 88 kg (194 lb)     Body mass index is 27.84 kg/m².    Intake/Output Summary (Last 24 hours) at 2024 1224  Last data filed at 2024 0900  Gross per 24 hour   Intake 360 ml   Output 352 ml   Net 8 ml     Diet: Diabetic Diets; Consistent Carbohydrate; Texture: Regular Texture (IDDSI 7); Fluid Consistency: Thin (IDDSI 0)  ----------------------------------------------------------------------------------------------------------------------  Physical exam:  Constitutional: Well-nourished elderly appearing  male in no apparent distress.     HENT:  Head:  Normocephalic and  atraumatic.  Mouth:  Moist mucous membranes.    Eyes:  Conjunctivae and EOM are normal.  Pupils are equal, round, and reactive to light.  No scleral icterus.    Neck:  Neck supple. No thyromegaly.  No JVD present.    Cardiovascular:  Regular rate and rhythm with no murmurs, rubs, clicks or gallops appreciated.  Pulmonary/Chest:  Clear to auscultation bilaterally with no crackles, wheezes or rhonchi appreciated.  Abdominal:  Soft. Nontender. Nondistended  Bowel sounds are normal in all four quadrants. No organomegally appreciated.   Musculoskeletal:  No edema, no tenderness, and no deformity.  No red or swollen joints anywhere.    Neurological:  Alert, Cranial nerves II-XII intact with no focal deficits.  No facial droop.  No slurred speech.   Skin:  Warm and dry to palpation with no rashes or lesions appreciated.  Peripheral vascular:  2+ radial and pedal pulses in bilateral upper and lower extremities.  Psychiatric:  Alert and oriented x3, demonstrates appropriate judgment and insight.  ---------------------------------------------------------------------------------------------  ----------------------------------------------------------------------------------------------------------------------  Results from last 7 days   Lab Units 02/21/24  0830   HSTROP T ng/L 38*     Results from last 7 days   Lab Units 02/22/24  0047 02/21/24  0830   WBC 10*3/mm3 5.86 6.26   HEMOGLOBIN g/dL 13.4 13.8   HEMATOCRIT % 42.6 43.3   MCV fL 98.6* 97.1*   MCHC g/dL 31.5 31.9   PLATELETS 10*3/mm3 226 206         Results from last 7 days   Lab Units 02/22/24  0047 02/21/24  0830   SODIUM mmol/L 140 142   POTASSIUM mmol/L 4.5 4.6   MAGNESIUM mg/dL 1.8  --    CHLORIDE mmol/L 106 108*   CO2 mmol/L 22.1 19.0*   BUN mg/dL 45* 45*   CREATININE mg/dL 1.87* 2.43*   CALCIUM mg/dL 9.1 9.0   GLUCOSE mg/dL 118* 130*   ALBUMIN g/dL  --  3.7   BILIRUBIN mg/dL  --  0.3   ALK PHOS U/L  --  87   AST (SGOT) U/L  --  30   ALT (SGPT) U/L  --  26  "  Estimated Creatinine Clearance: 30.5 mL/min (A) (by C-G formula based on SCr of 1.87 mg/dL (H)).    No results found for: \"AMMONIA\"      No results found for: \"BLOODCX\"  No results found for: \"URINECX\"  No results found for: \"WOUNDCX\"  No results found for: \"STOOLCX\"    I have personally looked at the labs and they are summarized above.  ----------------------------------------------------------------------------------------------------------------------  Imaging Results (Last 24 Hours)       ** No results found for the last 24 hours. **          ----------------------------------------------------------------------------------------------------------------------  Assessment and Plan:    Campylobacter infectious diarrhea -continue supportive care    2.  Acute kidney injury on CKD stage III-serum creatinine improved from 2.4 down to 1.8 today.  Continue IV fluids and repeat BMP in the morning    3.  Essential hypertension -well-controlled, will restart patient's home antihypertensive medications today    4.  Paroxysmal A-fib -currently in normal sinus rhythm, will continue home dose of aspirin and metoprolol    Disposition possible discharge in the next 24 to 48 hours    Farrukh Loredo, DO   02/22/24   12:24 EST     "

## 2024-02-22 NOTE — PAYOR COMM NOTE
"CONTACT:  PARTH PENDLETON RN  UTILIZATION MANAGEMENT DEPT.   [facility]   [address] [place], [address]   PHONE:  [phone]   FAX: [phone]       INPATIENT AUTH REQUEST        Chris Lorenzo (89 y.o. Male)       Date of Birth   1935    Social Security Number       Address   [place] [address]    Home Phone       MRN   [id number]       Rastafarian   Faith    Marital Status                               Admission Date   2/21/24    Admission Type   Emergency    Admitting Provider   Farrukh Loredo DO    Attending Provider   Farrukh Loredo DO    Department, Room/Bed   [facility] 3 SOUTH, 3316/2S       Discharge Date       Discharge Disposition       Discharge Destination                                 Attending Provider: Farrukh Loredo DO    Allergies: Adhesive Tape    Isolation: Spore   Infection: Campylobacter (02/21/24)   Code Status: CPR    Ht: 177.8 cm (70\")   Wt: 88 kg (194 lb)    Admission Cmt: None   Principal Problem: ROSIE (acute kidney injury) [N17.9]                   Active Insurance as of 2/21/2024       Primary Coverage       Payor Plan Insurance Group Employer/Plan Group    ANTHEM MEDICARE REPLACEMENT ANTHEM MEDICARE ADVANTAGE [id number]       Payor Plan Address Payor Plan Phone Number Payor Plan Fax Number Effective Dates    [address] [phone]  1/1/2024 - None Entered    [facility] [id number]         Subscriber Name Subscriber Birth Date Member ID       CHRIS LORENZO 1935 [id number]                     Emergency Contacts        (Rel.) Home Phone Work Phone Mobile Phone    EmmaKemi Cool (Spouse) -- -- [phone]    JOELLE (POA)FERMIN (Daughter) [phone] -- --                 History & Physical        Garcia Turner PA-C at 02/21/24 1037       Attestation signed by Farrukh Loredo DO at 02/21/24 8223    I have reviewed this documentation and agree.       "                [facility] Medicine Services  History & Physical    Patient Identification:  Name:  Angelito Carter  Age:  89 y.o.  Sex:  male  :  1935  MRN:  [id number]   Visit Number:  [id number]  Admit Date: 2024   Primary Care Physician:  Daylin Redmond APRN    Subjective     Chief complaint: Vomiting    History of presenting illness:      Angelito Carter is a 89 y.o. male who presented for further evaluation of N/V/D.  He was seen and examined in the ED with spouse at the bedside.  He reports 2-day history of frequent diarrhea.  Has been nonbloody.  Yesterday began to experience nausea and vomiting as well-he states has been unable to keep down much food or drink.  His daughter-in-law has been bringing him Pedialyte which she states stays down better than water.  He states his abdomen is diffusely tender.  He has had a fever up to 102 at home treated with aspirin.  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  They did not eat the same food that day.  He denies any upper respiratory symptoms.  No difficulty with urination or decreased urine output.  Has not been taking any antibiotics lately.  On further review of systems he did complain of generalized weakness which is worsening over the past 2 days.    Past medical history is significant for ASCVD, ischemic cardiomyopathy, HTN, paroxysmal A-fib, chronic renal insufficiency, GERD    Upon arrival to the ED, vital signs were temp 97.9, heart rate 75, respirations 16, /64, SpO2 94% on RA.  Baseline creatinine appears to be 1.3-1.4-was 2.43 on arrival.  Lactoferrin is positive.  C. difficile, GI PCR studies pending    Known Emergency Department medications received prior to my evaluation included Zofran, half liter normal saline bolus.   Emergency Department Room location at the time of my evaluation was [facility].      ---------------------------------------------------------------------------------------------------------------------   Review of Systems   Constitutional:  Positive for activity change, appetite change, chills, fatigue and fever.   HENT:  Negative for congestion and rhinorrhea.    Respiratory:  Negative for cough and shortness of breath.    Cardiovascular:  Negative for chest pain and palpitations.   Gastrointestinal:  Positive for abdominal pain, diarrhea, nausea and vomiting.   Genitourinary:  Negative for difficulty urinating and dysuria.   Musculoskeletal:  Negative for arthralgias and myalgias.   Skin:  Negative for rash and wound.   Neurological:  Positive for weakness. Negative for dizziness and light-headedness.        ---------------------------------------------------------------------------------------------------------------------   Past Medical History:   Diagnosis Date    Acute myocardial infarction     Adenocarcinoma     prostate gland    Arthritis     Heart attack     Hx of radiation therapy     Hypertension     Prostate cancer      Past Surgical History:   Procedure Laterality Date    BIOPSY PROSTATE NEEDLE / PUNCH / INCISIONAL      COLONOSCOPY N/A 2/14/2023    Procedure: COLONOSCOPY FOR SCREENING;  Surgeon: Cayla Strickland MD;  Location: [facility];  Service: Gastroenterology;  Laterality: N/A;    CORONARY ARTERY BYPASS GRAFT      ENDOSCOPY N/A 2/14/2023    Procedure: ESOPHAGOGASTRODUODENOSCOPY WITH BIOPSY;  Surgeon: Cayla Strickland MD;  Location: [facility] OR;  Service: Gastroenterology;  Laterality: N/A;    KNEE SURGERY Right     OTHER SURGICAL HISTORY      Coronary Artery Triple Arterial Byupass Graft    PROSTATE SURGERY      PROSTATECTOMY      Robotic-assisted    SKIN CANCER EXCISION Left     removed from left ear    VASECTOMY       Family History   Problem Relation Age of Onset    Kidney disease Father     Heart disease Father     Heart attack Father     Kidney disease  Mother     Heart disease Mother      Social History     Socioeconomic History    Marital status:    Tobacco Use    Smoking status: Never    Smokeless tobacco: Never   Vaping Use    Vaping Use: Never used   Substance and Sexual Activity    Alcohol use: Yes     Comment: once in awhile    Drug use: Never    Sexual activity: Defer     ---------------------------------------------------------------------------------------------------------------------   Allergies:  Adhesive tape  ---------------------------------------------------------------------------------------------------------------------   Home medications:    Medications below are reported home medications pulling from within the system; at this time, these medications have not been reconciled unless otherwise specified and are in the verification process for further verifcation as current home medications.  (Not in a hospital admission)      Hospital Scheduled Meds:     sodium chloride, 125 mL/hr, Last Rate: 125 mL/hr (02/21/24 2907)        Current listed hospital scheduled medications may not yet reflect those currently placed in orders that are signed and held awaiting patient's arrival to floor.   ---------------------------------------------------------------------------------------------------------------------     Objective     Vital Signs:  Temp:  [97.9 °F (36.6 °C)] 97.9 °F (36.6 °C)  Heart Rate:  [75] 75  Resp:  [16] 16  BP: (138)/(64) 138/64      02/21/24  0808   Weight: 90.7 kg (200 lb)     Body mass index is 28.7 kg/m².  ---------------------------------------------------------------------------------------------------------------------       Physical Exam  Vitals and nursing note reviewed.   Constitutional:       General: He is not in acute distress.  HENT:      Head: Normocephalic and atraumatic.   Eyes:      Extraocular Movements: Extraocular movements intact.      Conjunctiva/sclera: Conjunctivae normal.   Cardiovascular:      Rate and  "Rhythm: Normal rate and regular rhythm.   Pulmonary:      Effort: Pulmonary effort is normal.      Breath sounds: Normal breath sounds.   Abdominal:      Palpations: Abdomen is soft.      Tenderness: There is abdominal tenderness (Diffuse-worse in the lower quadrants).   Musculoskeletal:      Right lower leg: No edema.      Left lower leg: No edema.   Skin:     General: Skin is warm and dry.   Neurological:      Mental Status: He is alert. Mental status is at baseline.   Psychiatric:         Mood and Affect: Mood normal.         Behavior: Behavior normal.               ---------------------------------------------------------------------------------------------------------------------  EKG:        I have personally looked at the EKG.  ---------------------------------------------------------------------------------------------------------------------   Results from last 7 days   Lab Units 02/21/24  0830   WBC 10*3/mm3 6.26   HEMOGLOBIN g/dL 13.8   HEMATOCRIT % 43.3   MCV fL 97.1*   MCHC g/dL 31.9   PLATELETS 10*3/mm3 206         Results from last 7 days   Lab Units 02/21/24  0830   SODIUM mmol/L 142   POTASSIUM mmol/L 4.6   CHLORIDE mmol/L 108*   CO2 mmol/L 19.0*   BUN mg/dL 45*   CREATININE mg/dL 2.43*   CALCIUM mg/dL 9.0   GLUCOSE mg/dL 130*   ALBUMIN g/dL 3.7   BILIRUBIN mg/dL 0.3   ALK PHOS U/L 87   AST (SGOT) U/L 30   ALT (SGPT) U/L 26   Estimated Creatinine Clearance: 23.8 mL/min (A) (by C-G formula based on SCr of 2.43 mg/dL (H)).  No results found for: \"AMMONIA\"  Results from last 7 days   Lab Units 02/21/24  0830   HSTROP T ng/L 38*         Lab Results   Component Value Date    HGBA1C 5.70 (H) 10/09/2021     Lab Results   Component Value Date    TSH 1.170 10/09/2021    FREET4 1.15 04/13/2015     No results found for: \"PREGTESTUR\", \"PREGSERUM\", \"HCG\", \"HCGQUANT\"  Pain Management Panel  More data may exist         Latest Ref Rng & Units 11/11/2023 3/25/2015   Pain Management Panel   Creatinine, Urine mg/dL - " "202.6    Buprenorphine, Screen, Urine - Negative     -      Details          This result is from an external source.             No results found for: \"BLOODCX\"  No results found for: \"URINECX\"  No results found for: \"WOUNDCX\"  No results found for: \"STOOLCX\"      ---------------------------------------------------------------------------------------------------------------------  Imaging Results (Last 7 Days)       ** No results found for the last 168 hours. **            Cultures:  No results found for: \"BLOODCX\", \"URINECX\", \"WOUNDCX\", \"MRSACX\", \"RESPCX\", \"STOOLCX\"    Last echocardiogram:  Results for orders placed during the hospital encounter of 06/29/21    Adult Transthoracic Echo Complete W/ Cont if Necessary Per Protocol    Interpretation Summary  · Left ventricular ejection fraction appears to be 51 - 55%. Left ventricular systolic function is normal.  · Left ventricular diastolic function is consistent with (grade I) impaired relaxation.  · No significant functional valvular abnormalities noted  · There is no evidence of pericardial effusion          I have personally reviewed the above radiology images and read the final radiology report on 02/21/24  ---------------------------------------------------------------------------------------------------------------------  Assessment / Plan     There are no active hospital problems to display for this patient.      ASSESSMENT/PLAN:    Suspected viral GI illness  ROSIE on CKD, likely due to above  Admit to  Follow-up GI PCR, C. difficile studies  Supportive care for fever, nausea  Continue IV fluid replacement for now  Repeat labs in a.m.    Chronic:  ASCVD  Ischemic cardiomyopathy  HTN  Paroxysmal A-fib  CKD  GERD  Resume home regimen as indicated once med rec is complete    ----------  -Activity: As tolerated  -Expected length of stay: INPATIENT status due to the need for care which can only be reasonably provided in an hospital setting such as " aggressive/expedited ancillary services and/or consultation services, the necessity for IV medications, close physician monitoring and/or the possible need for procedures.  In such, I feel patient’s risk for adverse outcomes and need for care warrant INPATIENT evaluation and predict the patient’s care encounter to likely last beyond 2 midnights.   -Disposition pending course, anticipate return home following clinical improvement    High risk secondary to GI illness, ROSIE    There are no questions and answers to display.       Garcia Turner PA-C   02/21/24  10:38 EST     Electronically signed by Farrukh Loredo DO at 02/21/24 1458          Emergency Department Notes        Sofie Aquino at 02/21/24 1328          Went to obtain pt urine. Pt states he has not been able to pee in several days due to not being able to hold anything down but he will keep trying for us.     Electronically signed by Sofie Aquino at 02/21/24 1329       Maikol Lang PA at 02/21/24 0843       Attestation signed by Lucho Hayden DO at 02/21/24 1622        SUPERVISE: For this patient encounter, I reviewed the APC's documentation, treatment plan, and medical decision making.  Lucho Hayden DO 2/21/2024 16:22 EST                         Subjective   History of Present Illness  89-year-old male presents secondary to nausea vomiting diarrhea.  Patient states this started Monday.  He states he had a temp up to around 101.  He states prior to becoming sick on Monday he had ribs and sauerkraut.  He denies any recent antibiotic use.  He states he is getting about a dozen episodes of diarrhea per day.  He states that he has been around some people with similar symptoms.  He denies any blood in his stool or in his vomit.  He denies any recent foreign travel.  No known bad food or water contact.  Patient has a history of prostate cancer, coronary artery disease status post MI, hypertension.  He presents by private vehicle.  He  denies any abdominal pain however states he gets some cramping prior to having diarrhea.        Review of Systems   Constitutional: Negative.  Negative for fever.   HENT: Negative.     Respiratory: Negative.     Cardiovascular: Negative.  Negative for chest pain.   Gastrointestinal:  Positive for diarrhea, nausea and vomiting. Negative for abdominal pain.   Endocrine: Negative.    Genitourinary: Negative.  Negative for dysuria.   Skin: Negative.    Neurological: Negative.    Psychiatric/Behavioral: Negative.     All other systems reviewed and are negative.      Past Medical History:   Diagnosis Date    Acute myocardial infarction     Adenocarcinoma     prostate gland    Arthritis     Heart attack     Hx of radiation therapy     Hypertension     Prostate cancer        Allergies   Allergen Reactions    Adhesive Tape Rash       Past Surgical History:   Procedure Laterality Date    BIOPSY PROSTATE NEEDLE / PUNCH / INCISIONAL      COLONOSCOPY N/A 2/14/2023    Procedure: COLONOSCOPY FOR SCREENING;  Surgeon: Cayla Strickland MD;  Location: [facility];  Service: Gastroenterology;  Laterality: N/A;    CORONARY ARTERY BYPASS GRAFT      ENDOSCOPY N/A 2/14/2023    Procedure: ESOPHAGOGASTRODUODENOSCOPY WITH BIOPSY;  Surgeon: Cayla Strickland MD;  Location: [facility];  Service: Gastroenterology;  Laterality: N/A;    KNEE SURGERY Right     OTHER SURGICAL HISTORY      Coronary Artery Triple Arterial Byupass Graft    PROSTATE SURGERY      PROSTATECTOMY      Robotic-assisted    SKIN CANCER EXCISION Left     removed from left ear    VASECTOMY         Family History   Problem Relation Age of Onset    Kidney disease Father     Heart disease Father     Heart attack Father     Kidney disease Mother     Heart disease Mother        Social History     Socioeconomic History    Marital status:    Tobacco Use    Smoking status: Never    Smokeless tobacco: Never   Vaping Use    Vaping Use: Never used   Substance and  Sexual Activity    Alcohol use: Yes     Comment: once in awhile    Drug use: Never    Sexual activity: Defer           Objective   Physical Exam  Vitals and nursing note reviewed.   Constitutional:       General: He is not in acute distress.     Appearance: He is well-developed. He is not diaphoretic.   HENT:      Head: Normocephalic and atraumatic.      Right Ear: External ear normal.      Left Ear: External ear normal.      Nose: Nose normal.   Eyes:      Conjunctiva/sclera: Conjunctivae normal.      Pupils: Pupils are equal, round, and reactive to light.   Neck:      Vascular: No JVD.      Trachea: No tracheal deviation.   Cardiovascular:      Rate and Rhythm: Normal rate and regular rhythm.      Heart sounds: Normal heart sounds. No murmur heard.  Pulmonary:      Effort: Pulmonary effort is normal. No respiratory distress.      Breath sounds: Normal breath sounds. No wheezing.   Abdominal:      General: Bowel sounds are normal.      Palpations: Abdomen is soft.      Tenderness: There is no abdominal tenderness.   Musculoskeletal:         General: No deformity. Normal range of motion.      Cervical back: Normal range of motion and neck supple.   Skin:     General: Skin is warm and dry.      Coloration: Skin is not pale.      Findings: No erythema or rash.   Neurological:      Mental Status: He is alert and oriented to person, place, and time.      Cranial Nerves: No cranial nerve deficit.   Psychiatric:         Behavior: Behavior normal.         Thought Content: Thought content normal.         Procedures          ED Course  ED Course as of 02/21/24 1544   Wed Feb 21, 2024   0832 EKG notes sinus rhythm.  71 bpm.  Incomplete right bundle branch block noted.  No acute ST elevation.  QTc 443.  Electronically signed by Lucho Hayden DO, 02/21/24, 8:32 AM EST.   [SF]      ED Course User Index  [SF] Lucho Hayden DO                                 Results for orders placed or performed during the hospital encounter  of 02/21/24   Gastrointestinal Panel, PCR - Stool, Per Rectum    Specimen: Per Rectum; Stool   Result Value Ref Range    Campylobacter Detected (A) Not Detected    Plesiomonas shigelloides Not Detected Not Detected    Salmonella Not Detected Not Detected    Vibrio Not Detected Not Detected    Vibrio cholerae Not Detected Not Detected    Yersinia enterocolitica Not Detected Not Detected    Enteroaggregative E. coli (EAEC) Not Detected Not Detected    Enterotoxigenic E. coli (ETEC) lt/st Not Detected Not Detected    Shiga-like toxin-producing E. coli (STEC) stx1/stx2 Detected (A) Not Detected    E. coli O157 Not Detected Not Detected    Shigella/Enteroinvasive E. coli (EIEC) Not Detected Not Detected    Cryptosporidium Not Detected Not Detected    Cyclospora cayetanensis Not Detected Not Detected    Entamoeba histolytica Not Detected Not Detected    Giardia lamblia Not Detected Not Detected    Adenovirus F40/41 Not Detected Not Detected    Astrovirus Not Detected Not Detected    Norovirus GI/GII Not Detected Not Detected    Rotavirus A Not Detected Not Detected    Sapovirus (I, II, IV or V) Not Detected Not Detected   Clostridioides difficile Toxin, PCR - Stool, Per Rectum    Specimen: Per Rectum; Stool   Result Value Ref Range    Toxigenic C. difficile by PCR Negative Negative    027 Toxin Presumptive Negative    Comprehensive Metabolic Panel    Specimen: Blood   Result Value Ref Range    Glucose 130 (H) 65 - 99 mg/dL    BUN 45 (H) 8 - 23 mg/dL    Creatinine 2.43 (H) 0.76 - 1.27 mg/dL    Sodium 142 136 - 145 mmol/L    Potassium 4.6 3.5 - 5.2 mmol/L    Chloride 108 (H) 98 - 107 mmol/L    CO2 19.0 (L) 22.0 - 29.0 mmol/L    Calcium 9.0 8.6 - 10.5 mg/dL    Total Protein 7.2 6.0 - 8.5 g/dL    Albumin 3.7 3.5 - 5.2 g/dL    ALT (SGPT) 26 1 - 41 U/L    AST (SGOT) 30 1 - 40 U/L    Alkaline Phosphatase 87 39 - 117 U/L    Total Bilirubin 0.3 0.0 - 1.2 mg/dL    Globulin 3.5 gm/dL    A/G Ratio 1.1 g/dL    BUN/Creatinine Ratio  18.5 7.0 - 25.0    Anion Gap 15.0 5.0 - 15.0 mmol/L    eGFR 24.9 (L) >60.0 mL/min/1.73   Lipase    Specimen: Blood   Result Value Ref Range    Lipase 10 (L) 13 - 60 U/L   Single High Sensitivity Troponin T    Specimen: Blood   Result Value Ref Range    HS Troponin T 38 (H) <22 ng/L   CBC Auto Differential    Specimen: Blood   Result Value Ref Range    WBC 6.26 3.40 - 10.80 10*3/mm3    RBC 4.46 4.14 - 5.80 10*6/mm3    Hemoglobin 13.8 13.0 - 17.7 g/dL    Hematocrit 43.3 37.5 - 51.0 %    MCV 97.1 (H) 79.0 - 97.0 fL    MCH 30.9 26.6 - 33.0 pg    MCHC 31.9 31.5 - 35.7 g/dL    RDW 13.2 12.3 - 15.4 %    RDW-SD 47.2 37.0 - 54.0 fl    MPV 9.2 6.0 - 12.0 fL    Platelets 206 140 - 450 10*3/mm3   Fecal Lactoferrin Qual. - Stool, Per Rectum    Specimen: Per Rectum; Stool   Result Value Ref Range    Lactoferrin, Qual Positive (A) Negative   Manual Differential    Specimen: Blood   Result Value Ref Range    Neutrophil % 67.0 42.7 - 76.0 %    Lymphocyte % 15.0 (L) 19.6 - 45.3 %    Monocyte % 12.0 5.0 - 12.0 %    Bands %  6.0 (H) 0.0 - 5.0 %    Neutrophils Absolute 4.57 1.70 - 7.00 10*3/mm3    Lymphocytes Absolute 0.94 0.70 - 3.10 10*3/mm3    Monocytes Absolute 0.75 0.10 - 0.90 10*3/mm3    RBC Morphology Normal Normal    Platelet Morphology Normal Normal   ECG 12 Lead Other; nausea   Result Value Ref Range    QT Interval 408 ms    QTC Interval 443 ms   Green Top (Gel)   Result Value Ref Range    Extra Tube Hold for add-ons.    Gold Top - SST   Result Value Ref Range    Extra Tube Hold for add-ons.    Light Blue Top   Result Value Ref Range    Extra Tube Hold for add-ons.                  Medical Decision Making  89-year-old male presents secondary to nausea vomiting diarrhea.  Patient states this started Monday.  He states he had a temp up to around 101.  He states prior to becoming sick on Monday he had nalini and maxwell.  He denies any recent antibiotic use.  He states he is getting about a dozen episodes of diarrhea per day.   He states that he has been around some people with similar symptoms.  He denies any blood in his stool or in his vomit.  He denies any recent foreign travel.  No known bad food or water contact.  Patient has a history of prostate cancer, coronary artery disease status post MI, hypertension.  He presents by private vehicle.  He denies any abdominal pain however states he gets some cramping prior to having diarrhea.    Amount and/or Complexity of Data Reviewed  Labs: ordered. Decision-making details documented in ED Course.  ECG/medicine tests: ordered. Decision-making details documented in ED Course.  Discussion of management or test interpretation with external provider(s): Dr. Loredo    Risk  Prescription drug management.  Decision regarding hospitalization.  Risk Details: Patient was counseled by his diagnostic room labs.  He was agreeable to treatment plan of admission to hospital for further evaluation and treatment.        Final diagnoses:   ROSIE (acute kidney injury)   Nausea and vomiting, unspecified vomiting type   Diarrhea of infectious origin       ED Disposition  ED Disposition       ED Disposition   Decision to Admit    Condition   --    Comment   Level of Care: Med/Surg [1]   Diagnosis: ROSIE (acute kidney injury) [[id number]]   Certification: I Certify That Inpatient Hospital Services Are Medically Necessary For Greater Than 2 Midnights                 No follow-up provider specified.       Medication List      No changes were made to your prescriptions during this visit.            Maikol Lang PA  02/21/24 1544      Electronically signed by Lucho Hayden DO at 02/21/24 1622       Facility-Administered Medications as of 2/22/2024   Medication Dose Route Frequency Provider Last Rate Last Admin    amLODIPine (NORVASC) tablet 10 mg  10 mg Oral Daily Farrukh Loredo DO   10 mg at 02/21/24 2118    aspirin EC tablet 81 mg  81 mg Oral Daily Farrukh Loredo DO   81 mg at 02/21/24 2118     sennosides-docusate (PERICOLACE) 8.6-50 MG per tablet 2 tablet  2 tablet Oral BID PRN Farrukh Loredo DO        And    polyethylene glycol (MIRALAX) packet 17 g  17 g Oral Daily PRN Farrukh Loredo DO        And    bisacodyl (DULCOLAX) EC tablet 5 mg  5 mg Oral Daily PRN Farrukh Loredo DO        And    bisacodyl (DULCOLAX) suppository 10 mg  10 mg Rectal Daily PRN Farrukh Loredo DO        heparin (porcine) 5000 UNIT/ML injection 5,000 Units  5,000 Units Subcutaneous Q12H Farrukh Loredo DO   5,000 Units at 02/21/24 2122    [COMPLETED] metoprolol tartrate (LOPRESSOR) injection 5 mg  5 mg Intravenous Once Zenobia Dyer DO   5 mg at 02/22/24 0346    metoprolol tartrate (LOPRESSOR) injection 5 mg  5 mg Intravenous Q5 Min PRN Zenobia Dyer DO   5 mg at 02/22/24 0520    [COMPLETED] ondansetron (ZOFRAN) injection 4 mg  4 mg Intravenous Once Maikol Lang PA   4 mg at 02/21/24 0848    ondansetron (ZOFRAN) injection 4 mg  4 mg Intravenous Q6H PRN Farrukh Loredo DO        simethicone (MYLICON) chewable tablet 80 mg  80 mg Oral Daily PRN Farrukh Loredo DO        [COMPLETED] sodium chloride 0.9 % bolus 500 mL  500 mL Intravenous Once Maikol Lang PA   Stopped at 02/21/24 0918    sodium chloride 0.9 % flush 10 mL  10 mL Intravenous PRN Maikol Lang PA        sodium chloride 0.9 % flush 10 mL  10 mL Intravenous Q12H Farrukh Loredo DO   10 mL at 02/21/24 2118    sodium chloride 0.9 % flush 10 mL  10 mL Intravenous PRN Farrukh Loredo DO        sodium chloride 0.9 % infusion 40 mL  40 mL Intravenous PRN Farrukh Loredo DO        sodium chloride 0.9 % infusion  125 mL/hr Intravenous Continuous Maikol Lang  mL/hr at 02/22/24 0202 125 mL/hr at 02/22/24 0202     Orders (all)        Start     Ordered    02/23/24 0838  Auto Discontinue in 48 Hours if not Collected  ONCE CDIFF         02/21/24 0837     02/23/24 0838  Auto Discontinue GI Panel in 48 Hours if not Collected  ONCE GI PANEL         02/21/24 0837    02/22/24 0636  Case Management  Consult  Once        Provider:  (Not yet assigned)    02/22/24 0636    02/22/24 0600  CBC (No Diff)  Morning Draw         02/21/24 1723    02/22/24 0600  Basic Metabolic Panel  Morning Draw         02/21/24 1723    02/22/24 0415  metoprolol tartrate (LOPRESSOR) injection 5 mg  Once         02/22/24 0327    02/22/24 0348  metoprolol tartrate (LOPRESSOR) injection 5 mg  Every 5 Minutes PRN         02/22/24 0348    02/22/24 0327  Magnesium  Once         02/22/24 0326    02/22/24 0223  ECG 12 Lead Rhythm Change  STAT         02/22/24 0223    02/21/24 2100  sodium chloride 0.9 % flush 10 mL  Every 12 Hours Scheduled         02/21/24 1723    02/21/24 2100  heparin (porcine) 5000 UNIT/ML injection 5,000 Units  Every 12 Hours Scheduled         02/21/24 1723    02/21/24 2000  Vital Signs  Every 4 Hours       02/21/24 1723    02/21/24 1945  amLODIPine (NORVASC) tablet 10 mg  Daily         02/21/24 1859 02/21/24 1945  aspirin EC tablet 81 mg  Daily         02/21/24 1859 02/21/24 1859  simethicone (MYLICON) chewable tablet 80 mg  Daily PRN         02/21/24 1859    02/21/24 1822  PT Consult: Eval & Treat Functional Mobility Below Baseline  Once         02/21/24 1821    02/21/24 1800  Oral Care  2 Times Daily       02/21/24 1723    02/21/24 1724  Intake & Output  Every Shift       02/21/24 1723    02/21/24 1724  Weigh Patient  Once         02/21/24 1723    02/21/24 1724  Insert Peripheral IV  Once         02/21/24 1723    02/21/24 1724  Saline Lock & Maintain IV Access  Continuous         02/21/24 1723    02/21/24 1724  Inpatient Admission  Once         02/21/24 1723    02/21/24 1724  Code Status and Medical Interventions:  Continuous         02/21/24 1723    02/21/24 1724  Diet: Diabetic Diets; Consistent Carbohydrate; Texture: Regular Texture (IDDSI 7); Fluid  Consistency: Thin (IDDSI 0)  Diet Effective Now         02/21/24 1723    02/21/24 1723  sodium chloride 0.9 % flush 10 mL  As Needed         02/21/24 1723    02/21/24 1723  sodium chloride 0.9 % infusion 40 mL  As Needed         02/21/24 1723    02/21/24 1723  ondansetron (ZOFRAN) injection 4 mg  Every 6 Hours PRN         02/21/24 1723    02/21/24 1723  sennosides-docusate (PERICOLACE) 8.6-50 MG per tablet 2 tablet  2 Times Daily PRN        See Hyperspace for full Linked Orders Report.    02/21/24 1723    02/21/24 1723  polyethylene glycol (MIRALAX) packet 17 g  Daily PRN        See Hyperspace for full Linked Orders Report.    02/21/24 1723    02/21/24 1723  bisacodyl (DULCOLAX) EC tablet 5 mg  Daily PRN        See Hyperspace for full Linked Orders Report.    02/21/24 1723    02/21/24 1723  bisacodyl (DULCOLAX) suppository 10 mg  Daily PRN        See Hyperspace for full Linked Orders Report.    02/21/24 1723    02/21/24 1518  Inpatient Admission  Once         02/21/24 1517    02/21/24 1132  Stool Culture, Targeted - Stool, Per Rectum  Once         02/21/24 1131    02/21/24 0929  sodium chloride 0.9 % infusion  Continuous         02/21/24 0913    02/21/24 0907  Manual Differential  Once         02/21/24 0906    02/21/24 0854  ondansetron (ZOFRAN) injection 4 mg  Once         02/21/24 0838    02/21/24 0853  sodium chloride 0.9 % bolus 500 mL  Once         02/21/24 0837    02/21/24 0838  Clostridioides difficile Toxin - Stool, Per Rectum  Once         02/21/24 0837    02/21/24 0838  Patient Isolation Contact Spore  Continuous        Comments: Isolation Precautions Per Clostridium Difficile (CDI) Infection Control Policy / Process    02/21/24 0837    02/21/24 0838  Gastrointestinal Panel, PCR - Stool, Per Rectum  Once         02/21/24 0837    02/21/24 0838  Fecal Lactoferrin Qual. - Stool, Per Rectum  STAT         02/21/24 0837    02/21/24 0838  Stool Culture (Reference Lab) - Stool, Per Rectum  Once         02/21/24  0837    02/21/24 0838  Clostridioides difficile Toxin, PCR - Stool, Per Rectum  PROCEDURE ONCE         02/21/24 0837    02/21/24 0836  Scan Slide  Once,   Status:  Canceled         02/21/24 0835    02/21/24 0809  CBC & Differential  Once         02/21/24 0808    02/21/24 0809  Comprehensive Metabolic Panel  Once         02/21/24 0808    02/21/24 0809  Lipase  Once         02/21/24 0808    02/21/24 0809  Alex Draw  Once         02/21/24 0808    02/21/24 0809  Urinalysis With Microscopic If Indicated (No Culture) - Urine, Clean Catch  Once         02/21/24 0808    02/21/24 0809  Single High Sensitivity Troponin T  Once         02/21/24 0808    02/21/24 0809  ECG 12 Lead Other; nausea  Once         02/21/24 0808    02/21/24 0809  Insert Peripheral IV  Once        See Hyperspace for full Linked Orders Report.    02/21/24 0808    02/21/24 0809  CBC Auto Differential  PROCEDURE ONCE         02/21/24 0808    02/21/24 0809  Green Top (Gel)  PROCEDURE ONCE         02/21/24 0808    02/21/24 0809  Lavender Top  PROCEDURE ONCE,   Status:  Canceled         02/21/24 0808    02/21/24 0809  Gold Top - SST  PROCEDURE ONCE         02/21/24 0808    02/21/24 0809  Light Blue Top  PROCEDURE ONCE         02/21/24 0808    02/21/24 0808  sodium chloride 0.9 % flush 10 mL  As Needed        See Hyperspace for full Linked Orders Report.    02/21/24 0808    Unscheduled  Up With Assistance  As Needed       02/21/24 1723    --  amLODIPine (NORVASC) 10 MG tablet  Daily         02/21/24 1703    --  naproxen (NAPROSYN) 500 MG tablet  2 Times Daily PRN         02/21/24 1703    --  aspirin 81 MG EC tablet  Daily         02/21/24 1703    --  diphenhydrAMINE-acetaminophen (TYLENOL PM)  MG tablet per tablet  Nightly PRN         02/21/24 1703    --  simethicone (MYLICON) 80 MG chewable tablet  Daily PRN         02/21/24 1703                  Physician Progress Notes (all)    No notes of this type exist for this encounter.       Consult Notes  (all)    No notes of this type exist for this encounter.

## 2024-02-22 NOTE — PLAN OF CARE
Goal Outcome Evaluation:   Pt is currently resting in bed. Pt has no complaints or S/S of distress. Will continue to follow plan of care.

## 2024-02-23 ENCOUNTER — READMISSION MANAGEMENT (OUTPATIENT)
Dept: CALL CENTER | Facility: HOSPITAL | Age: 89
End: 2024-02-23
Payer: MEDICARE

## 2024-02-23 VITALS
HEART RATE: 79 BPM | WEIGHT: 195.2 LBS | HEIGHT: 70 IN | TEMPERATURE: 98.8 F | RESPIRATION RATE: 20 BRPM | BODY MASS INDEX: 27.94 KG/M2 | SYSTOLIC BLOOD PRESSURE: 156 MMHG | OXYGEN SATURATION: 93 % | DIASTOLIC BLOOD PRESSURE: 70 MMHG

## 2024-02-23 PROBLEM — N17.9 AKI (ACUTE KIDNEY INJURY): Status: RESOLVED | Noted: 2024-02-21 | Resolved: 2024-02-23

## 2024-02-23 LAB
ANION GAP SERPL CALCULATED.3IONS-SCNC: 9.1 MMOL/L (ref 5–15)
BUN SERPL-MCNC: 34 MG/DL (ref 8–23)
BUN/CREAT SERPL: 24.3 (ref 7–25)
CALCIUM SPEC-SCNC: 9.3 MG/DL (ref 8.6–10.5)
CHLORIDE SERPL-SCNC: 109 MMOL/L (ref 98–107)
CO2 SERPL-SCNC: 23.9 MMOL/L (ref 22–29)
CREAT SERPL-MCNC: 1.4 MG/DL (ref 0.76–1.27)
EGFRCR SERPLBLD CKD-EPI 2021: 48.3 ML/MIN/1.73
GLUCOSE SERPL-MCNC: 118 MG/DL (ref 65–99)
POTASSIUM SERPL-SCNC: 4 MMOL/L (ref 3.5–5.2)
QT INTERVAL: 408 MS
QTC INTERVAL: 443 MS
SODIUM SERPL-SCNC: 142 MMOL/L (ref 136–145)

## 2024-02-23 PROCEDURE — 25010000002 HEPARIN (PORCINE) PER 1000 UNITS: Performed by: INTERNAL MEDICINE

## 2024-02-23 PROCEDURE — 99239 HOSP IP/OBS DSCHRG MGMT >30: CPT | Performed by: INTERNAL MEDICINE

## 2024-02-23 PROCEDURE — 80048 BASIC METABOLIC PNL TOTAL CA: CPT | Performed by: INTERNAL MEDICINE

## 2024-02-23 RX ADMIN — ASPIRIN 81 MG: 81 TABLET, COATED ORAL at 09:37

## 2024-02-23 RX ADMIN — HEPARIN SODIUM 5000 UNITS: 5000 INJECTION INTRAVENOUS; SUBCUTANEOUS at 09:37

## 2024-02-23 RX ADMIN — AMLODIPINE BESYLATE 10 MG: 10 TABLET ORAL at 09:37

## 2024-02-23 RX ADMIN — Medication 10 ML: at 09:38

## 2024-02-23 NOTE — DISCHARGE SUMMARY
[facility] HOSPITALIST MEDICINE DISCHARGE SUMMARY    Patient Identification:  Name:  Angelito Carter  Age:  89 y.o.  Sex:  male  :  1935  MRN:  [id number]  Visit Number:  [id number]    Date of Admission: 2024  Date of Discharge: 2024    PCP: Daylin Redmond APRN    DISCHARGE DIAGNOSIS   Campylobacter diarrhea  Acute kidney injury on CKD stage IIIa  Essential hypertension  Paroxysmal A-fib      CONSULTS  None      PROCEDURES PERFORMED   None      HOSPITAL COURSE  Mr. Carter is a 89 y.o. male who presented to [facility] ED on 2024 with a chief complaint of nausea with vomiting and diarrhea.  Patient has a past medical history remarkable for ischemic cardiomyopathy, essential hypertension, paroxysmal A-fib, CKD stage IIIa and GERD.  Patient reported a 2-day history of frequent diarrhea and development of nausea with vomiting the day prior to evaluation in the emergency department.  He reported being unable to eat or drink for those 2 days.  Patient's family member had been bringing him Pedialyte to try to stay hydrated.  Patient also reported fever of 102 °F at home.  For this reason, he presented to the emergency department for further treatment and evaluation.  Initial evaluation in the emergency department did consist of basic laboratory work as well as physical exam and vital signs.  Initial vital signs found patient's blood pressure 138/64, respirations 16, heart rate 75 and temperature 97.9 °F with oxygen saturation 94% on room air.  Initial lab work did include CBC and CMP.  CMP demonstrated elevated Cr of 2.4, which was above patient's baseline Cr of 1.4. As such, patient was admitted for further treatment and evaluation of ROSIE felt to be secondary to infectious diarrhea.    GI PCR panel was obtained which was positive for Campylobacter.  Patient was offered supportive care and given IVF with NS at 125ml/hr.  Repeat BMP was obtained daily, and patient's ROSIE  did eventually resolve.  Serum Cr returned to 1.4 on date of discharge.  Patient also reported improvement in the number of BM's daily, but was still in excess of his average daily BM's.  I did discuss with the patient the possibility of recurrent ROSIE in the setting of increased bowel movements.  However, patient stated he was wanting to go home as he was feeling much improved, and that he would try to increase his oral fluid intake. As treatment of campylobacter infectious diarrhea is largely supportive in nature, it is felt reasonable to discharge patient home with intentions to increase oral fluid intake and close follow up with pcp in 1 week.  As such, it is felt patient has reached maximum medical benefit of current hospitalization and he will be discharged home in stable condition today.  The beforementioned plan was thoroughly discussed with the patient and he expressed his understanding and willingness to proceed with the beforementioned plan.    VITAL SIGNS:      02/21/24  0808 02/22/24  0520 02/23/24  0500   Weight: 90.7 kg (200 lb) 88 kg (194 lb) 88.5 kg (195 lb 3.2 oz)     Body mass index is 28.01 kg/m².    PHYSICAL EXAM:  Constitutional: Well-nourished elderly appearing  male in no apparent distress.     HENT:  Head:  Normocephalic and atraumatic.  Mouth:  Moist mucous membranes.    Eyes:  Conjunctivae and EOM are normal.  Pupils are equal, round, and reactive to light.  No scleral icterus.    Neck:  Neck supple. No thyromegaly.  No JVD present.    Cardiovascular:  Regular rate and rhythm with no murmurs, rubs, clicks or gallops appreciated.  Pulmonary/Chest:  Clear to auscultation bilaterally with no crackles, wheezes or rhonchi appreciated.  Abdominal:  Soft. Nontender. Nondistended  Bowel sounds are normal in all four quadrants. No organomegally appreciated.   Musculoskeletal:  No edema, no tenderness, and no deformity.  No red or swollen joints anywhere.    Neurological:  Alert, Cranial  nerves II-XII intact with no focal deficits.  No facial droop.  No slurred speech.   Skin:  Warm and dry to palpation with no rashes or lesions appreciated.  Peripheral vascular:  2+ radial and pedal pulses in bilateral upper and lower extremities.  Psychiatric:  Alert and oriented x3, demonstrates appropriate judgment and insight.    DISCHARGE DISPOSITION   Stable    DISCHARGE MEDICATIONS:     Discharge Medications        Continue These Medications        Instructions Start Date   amLODIPine 10 MG tablet  Commonly known as: NORVASC   10 mg, Oral, Daily      aspirin 81 MG EC tablet   81 mg, Oral, Daily      diphenhydrAMINE-acetaminophen  MG tablet per tablet  Commonly known as: TYLENOL PM   1 tablet, Oral, Nightly PRN      lisinopril 10 MG tablet  Commonly known as: PRINIVIL,ZESTRIL   1 tablet, Oral, Daily      simethicone 80 MG chewable tablet  Commonly known as: MYLICON   80 mg, Oral, Daily PRN             Stop These Medications      naproxen 500 MG tablet  Commonly known as: NAPROSYN                    Additional Instructions for the Follow-ups that You Need to Schedule       Discharge Follow-up with PCP   As directed       Currently Documented PCP:    Daylin Redmond APRN    PCP Phone Number:    [phone]     Follow Up Details: please follow up with pcp in 1 week        Discharge Follow-up with Specialty: GI; 2 Weeks   As directed      Specialty: GI   Follow Up: 2 Weeks   Follow Up Details: recurrent diarrhea               Follow-up Information       Daylin Redmond APRN .    Specialty: Family Medicine  Why: please follow up with pcp in 1 week  Contact information:  [address]  [facility] [address]  [place] [address]  [phone]                             TEST  RESULTS PENDING AT DISCHARGE  Pending Labs       Order Current Status    Stool Culture (Reference Lab) - Stool, Per Rectum In process    Stool Culture, Targeted - Stool, Per Rectum In process             Farrukh Loredo,  DO  02/23/24  12:40 EST    Please note that this discharge summary required more than 30 minutes to complete.    Please send a copy of this dictation to the following providers:  Daylin Redmond APRN

## 2024-02-23 NOTE — PLAN OF CARE
Goal Outcome Evaluation: Patient has been fairly pleasant today. Compliant with most medications and care as ordered. He remains on RA, saturations maintaining well above 90%. He has been ambulating to the bathroom, steady gait noted. Patient stated he has had multiple BM this shift. Isolation precautions maintained. He is to be DC on this date. IV and Telemetry monitor removed as ordered. Family at bedside. Will continue with plan of care.     1335 PM: DC Papers reviewed with patient and wife, understanding verbalized. Awaiting transport.

## 2024-02-23 NOTE — PLAN OF CARE
Patient resting in the bed throughout the night. No s.s of distress noted. No complaints. Will continue to follow care plan.

## 2024-02-23 NOTE — DISCHARGE INSTR - APPOINTMENTS
Pt  has  an  appointment  with  Daylin Watts  for  February 29  at  9:40  and  will get a referral  for  GI  for  recurrent  diarrhea.

## 2024-02-23 NOTE — DISCHARGE INSTR - DIET
Diet: Diabetic Diets; Consistent Carbohydrate; Texture: Regular Texture (IDDSI 7); Fluid Consistency: Thin (IDDSI 0)

## 2024-02-24 NOTE — OUTREACH NOTE
Prep Survey      Flowsheet Row Responses   [facility] patient discharged from? [facility]   Is LACE score < 7 ? No   Eligibility Readm Mgmt   Discharge diagnosis ROSIE (acute kidney injury)   Does the patient have one of the following disease processes/diagnoses(primary or secondary)? Other   Does the patient have Home health ordered? No   Is there a DME ordered? No   Prep survey completed? Yes            Jannet WELCH - Registered Nurse

## 2024-02-25 LAB
QT INTERVAL: 334 MS
QT INTERVAL: 384 MS
QTC INTERVAL: 405 MS
QTC INTERVAL: 504 MS

## 2024-02-26 ENCOUNTER — READMISSION MANAGEMENT (OUTPATIENT)
Dept: CALL CENTER | Facility: HOSPITAL | Age: 89
End: 2024-02-26
Payer: MEDICARE

## 2024-02-26 LAB
BACTERIA SPEC CULT: ABNORMAL
BACTERIA SPEC CULT: ABNORMAL
CAMPYLOBACTER STL CULT: ABNORMAL
E COLI SXT STL QL IA: NEGATIVE
SALM + SHIG STL CULT: ABNORMAL

## 2024-02-26 NOTE — OUTREACH NOTE
Medical Week 1 Survey      Flowsheet Row Responses   [facility] patient discharged from? [facility]   Does the patient have one of the following disease processes/diagnoses(primary or secondary)? Other   Week 1 attempt successful? Yes   Call start time 1211   Call end time 1217   Discharge diagnosis ROSIE (acute kidney injury)Wife   Person spoke with today (if not patient) and relationship Wife and Pt   Meds reviewed with patient/caregiver? Yes   Is the patient having any side effects they believe may be caused by any medication additions or changes? No   Does the patient have all medications ordered at discharge? Yes   Is the patient taking all medications as directed (includes completed medication regime)? Yes   Does the patient have a primary care provider?  Yes   Does the patient have an appointment with their PCP within 7 days of discharge? Yes   Has the patient kept scheduled appointments due by today? N/A   Has home health visited the patient within 72 hours of discharge? N/A   Psychosocial issues? No   Did the patient receive a copy of their discharge instructions? Yes   Nursing interventions Reviewed instructions with patient   What is the patient's perception of their health status since discharge? Improving   Week 1 call completed? Yes   Wrap up additional comments Pt states isnt feeling great. Advised to contact PCP to see if they can help or get  him in sooner. Wife agreed.   Call end time 1217            Francesca CAMACHO - Registered Nurse

## 2024-03-06 ENCOUNTER — READMISSION MANAGEMENT (OUTPATIENT)
Dept: CALL CENTER | Facility: HOSPITAL | Age: 89
End: 2024-03-06
Payer: MEDICARE

## 2024-03-06 NOTE — OUTREACH NOTE
Medical Week 2 Survey      Flowsheet Row Responses   [facility] patient discharged from? [facility]   Does the patient have one of the following disease processes/diagnoses(primary or secondary)? Other   Week 2 attempt successful? Yes   Call start time 1536   Discharge diagnosis ROSIE (acute kidney injury)Wife   Call end time 1540   Is the patient taking all medications as directed (includes completed medication regime)? Yes   Does the patient have a primary care provider?  Yes   Does the patient have an appointment with their PCP within 7 days of discharge? Greater than 7 days   Has the patient kept scheduled appointments due by today? N/A   Psychosocial issues? No   What is the patient's perception of their health status since discharge? Improving   Is the patient/caregiver able to teach back signs and symptoms related to disease process for when to call PCP? Yes   Is the patient/caregiver able to teach back signs and symptoms related to disease process for when to call 911? Yes   Is the patient/caregiver able to teach back the hierarchy of who to call/visit for symptoms/problems? PCP, Specialist, Home health nurse, Urgent Care, ED, 911 Yes   If the patient is a current smoker, are they able to teach back resources for cessation? Not a smoker   Additional teach back comments States he is not 100% but improving every day.  Denies any further episodes of nausea, vomitting and diarrhea.   Week 2 Call Completed? Yes   Graduated Yes   Graduated/Revoked comments Denies questions or needs at this time.   Call end time 1540            Cayla GRAFF - Licensed Nurse

## 2024-03-17 LAB — REF LAB TEST METHOD: NORMAL

## 2024-03-25 ENCOUNTER — OFFICE VISIT (OUTPATIENT)
Dept: GASTROENTEROLOGY | Facility: CLINIC | Age: 89
End: 2024-03-25
Payer: MEDICARE

## 2024-03-25 VITALS
SYSTOLIC BLOOD PRESSURE: 151 MMHG | WEIGHT: 200 LBS | HEART RATE: 61 BPM | HEIGHT: 70 IN | BODY MASS INDEX: 28.63 KG/M2 | DIASTOLIC BLOOD PRESSURE: 62 MMHG

## 2024-03-25 DIAGNOSIS — K22.70 BARRETT'S ESOPHAGUS WITHOUT DYSPLASIA: ICD-10-CM

## 2024-03-25 DIAGNOSIS — K59.04 CHRONIC IDIOPATHIC CONSTIPATION: Primary | ICD-10-CM

## 2024-03-25 DIAGNOSIS — B96.81 HELICOBACTER PYLORI GASTRITIS: ICD-10-CM

## 2024-03-25 DIAGNOSIS — K21.9 GASTROESOPHAGEAL REFLUX DISEASE WITHOUT ESOPHAGITIS: ICD-10-CM

## 2024-03-25 DIAGNOSIS — K29.70 HELICOBACTER PYLORI GASTRITIS: ICD-10-CM

## 2024-03-25 PROCEDURE — 1160F RVW MEDS BY RX/DR IN RCRD: CPT | Performed by: NURSE PRACTITIONER

## 2024-03-25 PROCEDURE — 99214 OFFICE O/P EST MOD 30 MIN: CPT | Performed by: NURSE PRACTITIONER

## 2024-03-25 PROCEDURE — 1159F MED LIST DOCD IN RCRD: CPT | Performed by: NURSE PRACTITIONER

## 2024-03-25 RX ORDER — PANTOPRAZOLE SODIUM 40 MG/1
40 TABLET, DELAYED RELEASE ORAL DAILY
Qty: 30 TABLET | Refills: 5 | Status: SHIPPED | OUTPATIENT
Start: 2024-03-25

## 2024-03-25 RX ORDER — CALCIUM POLYCARBOPHIL 625 MG
625 TABLET ORAL DAILY
Qty: 30 TABLET | Refills: 3 | Status: SHIPPED | OUTPATIENT
Start: 2024-03-25

## 2024-03-25 NOTE — PROGRESS NOTES
"DATE:  3/25/2024    REASON FOR FOLLOW UP: Diarrhea , Maher's esophagus, GERD    CHIEF COMPLAINT:  Chief Complaint   Patient presents with    Campylobacter Gastroenteritis     HISTORY OF PRESENT ILLNESS:   Angelito Carter is a very pleasant 89 y.o. male who is being seen today for follow-up of diarrhea.  Patient has previously been following with Luis Manuel Shaffer PA-C and was last seen in December 2022.  Please see previous notes for prior management.  In review of his records, he previously underwent EGD/colonoscopy with Dr. Lim on 2/14/2023. 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 This is a very short segment Maher's esophagus.  Biopsies of the stomach were positive for H. pylori gastritis but he did not receive treatment for this.  With colonoscopy, he had two polyps removed from the colon. One polyp was a tubular adenoma. The other polyp was a inflammatory polyp which is noncancerous. Due to age, it is likely that patient will not need repeat colonoscopy in the future. He recently presented [facility] with complaints of nausea/vomiting, diarrhea, fever and dehydration.  He was admitted with Campylobacter diarrhea and acute on chronic kidney disease.  With supportive care, acute symptoms resolved.  Following discharge, clinically he has been doing well.  He reports diarrhea remains resolved. At present, he reports having at least daily BM with stool type 3 or 4 on BSS.  However, he says his stool looks like \"it has air in it\".  He has intermittent abdominal bloating.  He denies having melena or BRBPR. He reports GERD remains well controlled with Protonix 40 mg PO daily.  He has no other complaints today.     PAST MEDICAL HISTORY:  Past Medical History:   Diagnosis Date    Acute myocardial infarction     Adenocarcinoma     prostate gland    Arthritis     Heart attack     Hx of radiation therapy     Hypertension     Prostate " cancer        PAST SURGICAL HISTORY:  Past Surgical History:   Procedure Laterality Date    BIOPSY PROSTATE NEEDLE / PUNCH / INCISIONAL      COLONOSCOPY N/A 2/14/2023    Procedure: COLONOSCOPY FOR SCREENING;  Surgeon: Cayla Strickland MD;  Location: [facility];  Service: Gastroenterology;  Laterality: N/A;    CORONARY ARTERY BYPASS GRAFT      ENDOSCOPY N/A 2/14/2023    Procedure: ESOPHAGOGASTRODUODENOSCOPY WITH BIOPSY;  Surgeon: Cayla Strickland MD;  Location: [facility];  Service: Gastroenterology;  Laterality: N/A;    KNEE SURGERY Right     OTHER SURGICAL HISTORY      Coronary Artery Triple Arterial Byupass Graft    PROSTATE SURGERY      PROSTATECTOMY      Robotic-assisted    SKIN CANCER EXCISION Left     removed from left ear    VASECTOMY         FAMILY HISTORY:  Family History   Problem Relation Age of Onset    Kidney disease Father     Heart disease Father     Heart attack Father     Kidney disease Mother     Heart disease Mother        SOCIAL HISTORY:  Social History     Socioeconomic History    Marital status:    Tobacco Use    Smoking status: Never    Smokeless tobacco: Never   Vaping Use    Vaping status: Never Used   Substance and Sexual Activity    Alcohol use: Yes     Comment: once in awhile    Drug use: Never    Sexual activity: Defer       MEDICATIONS:  The current medication list was reviewed in the EMR    Current Outpatient Medications:     amLODIPine (NORVASC) 10 MG tablet, Take 1 tablet by mouth Daily., Disp: , Rfl:     aspirin 81 MG EC tablet, Take 1 tablet by mouth Daily., Disp: , Rfl:     diphenhydrAMINE-acetaminophen (TYLENOL PM)  MG tablet per tablet, Take 1 tablet by mouth At Night As Needed for Sleep., Disp: , Rfl:     lisinopril (PRINIVIL,ZESTRIL) 10 MG tablet, Take 1 tablet by mouth Daily., Disp: , Rfl:     simethicone (MYLICON) 80 MG chewable tablet, Chew 1 tablet Daily As Needed for Flatulence (stomach pain / gas build up)., Disp: , Rfl:     ALLERGIES:   "  Allergies   Allergen Reactions    Adhesive Tape Rash       REVIEW OF SYSTEMS:    A comprehensive 14 point review of systems was performed.  Significant findings as mentioned above.  All other systems reviewed and are negative.        Physical Exam   Vital Signs: /62 (BP Location: Left arm, Patient Position: Sitting, Cuff Size: Large Adult)   Pulse 61   Ht 177.8 cm (70\")   Wt 90.7 kg (200 lb)   BMI 28.70 kg/m²     General: Elderly, Well developed, well nourished, alert and oriented x 3, in no acute distress.   Head: ATNC   Eyes: PERRL, No evidence of conjunctivitis.   Nose: No nasal discharge.   Mouth: Oral mucosal membranes moist. No oral ulceration or hemorrhages.   Neck: Neck supple. No thyromegaly. No JVD.   Lungs: Clear in all fields to A&P without rales, rhonchi or wheezing.   Heart: Regular rate and rhythm. No murmurs, rubs, or gallops.   Abdomen: Soft. Bowel sounds are normoactive. Nontender with palpation.   Extremities: No cyanosis or edema.   Neurologic: Grossly non-focal exam     RECENT LABS:  Lab Results   Component Value Date    WBC 5.86 02/22/2024    HGB 13.4 02/22/2024    HCT 42.6 02/22/2024    MCV 98.6 (H) 02/22/2024    RDW 13.0 02/22/2024     02/22/2024    NEUTRORELPCT 66.4 11/11/2023    LYMPHORELPCT 16.0 (L) 11/11/2023    MONORELPCT 9.8 11/11/2023    EOSRELPCT 6.5 (H) 11/11/2023    BASORELPCT 0.6 11/11/2023    NEUTROABS 4.57 02/21/2024    LYMPHSABS 1.38 11/11/2023       Lab Results   Component Value Date     02/23/2024    K 4.0 02/23/2024    CO2 23.9 02/23/2024     (H) 02/23/2024    BUN 34 (H) 02/23/2024    CREATININE 1.40 (H) 02/23/2024    EGFRIFNONA 61 10/11/2021    GLUCOSE 118 (H) 02/23/2024    CALCIUM 9.3 02/23/2024    ALKPHOS 87 02/21/2024    AST 30 02/21/2024    ALT 26 02/21/2024    BILITOT 0.3 02/21/2024    ALBUMIN 3.7 02/21/2024    PROTEINTOT 7.2 02/21/2024    MG 1.8 02/22/2024    PHOS 3.9 03/30/2015     ASSESSMENT & PLAN:  Angelito Carter is a very pleasant 89 " y.o. male with    1. Maher's esophagus:  2. H.Pylori gastritis:  3. GERD:    -In review of his records, he previously underwent EGD with Dr. Lim on 2/14/2023. 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  Biopsies of the stomach were positive for H. pylori gastritis but he did not receive treatment for this. He is currently taking Protonix 40 mg p.o. daily which is controlling GERD well.  Will continue.  Will also treat H. pylori with clarithromycin and amoxicillin.  -Will have patient return to clinic in 3 months.    4.  Campylobacter diarrhea:  - Now resolved. He is currently with mild constipation. Will start fibercon 1 tablet daily with 8 oz glass of water.     The patient was in agreement with the plan and all questions were answered to his satisfaction.     Thank you so much for allowing us to participate in the care of Angelito Carter . Please do not hesitate to contact us with any questions or concerns.           Electronically Signed by: REILLY Levy , March 25, 2024 13:40 EDT       CC:   Daylin Redmond APRN

## 2024-03-26 RX ORDER — CLARITHROMYCIN 500 MG/1
500 TABLET, COATED ORAL 2 TIMES DAILY
Qty: 28 TABLET | Refills: 0 | Status: SHIPPED | OUTPATIENT
Start: 2024-03-26

## 2024-03-26 RX ORDER — AMOXICILLIN 500 MG/1
1000 CAPSULE ORAL 2 TIMES DAILY
Qty: 56 CAPSULE | Refills: 0 | Status: SHIPPED | OUTPATIENT
Start: 2024-03-26 | End: 2024-04-09

## 2024-04-18 ENCOUNTER — OFFICE VISIT (OUTPATIENT)
Dept: ORTHOPEDIC SURGERY | Facility: CLINIC | Age: 89
End: 2024-04-18
Payer: MEDICARE

## 2024-04-18 VITALS — WEIGHT: 199.96 LBS | BODY MASS INDEX: 28.63 KG/M2 | HEIGHT: 70 IN

## 2024-04-18 DIAGNOSIS — M17.12 PRIMARY OSTEOARTHRITIS OF LEFT KNEE: Primary | ICD-10-CM

## 2024-04-18 RX ORDER — LIDOCAINE HYDROCHLORIDE 10 MG/ML
5 INJECTION, SOLUTION EPIDURAL; INFILTRATION; INTRACAUDAL; PERINEURAL
Status: COMPLETED | OUTPATIENT
Start: 2024-04-18 | End: 2024-04-18

## 2024-04-18 RX ORDER — METHYLPREDNISOLONE ACETATE 80 MG/ML
80 INJECTION, SUSPENSION INTRA-ARTICULAR; INTRALESIONAL; INTRAMUSCULAR; SOFT TISSUE
Status: COMPLETED | OUTPATIENT
Start: 2024-04-18 | End: 2024-04-18

## 2024-04-18 RX ADMIN — LIDOCAINE HYDROCHLORIDE 5 ML: 10 INJECTION, SOLUTION EPIDURAL; INFILTRATION; INTRACAUDAL; PERINEURAL at 10:32

## 2024-04-18 RX ADMIN — METHYLPREDNISOLONE ACETATE 80 MG: 80 INJECTION, SUSPENSION INTRA-ARTICULAR; INTRALESIONAL; INTRAMUSCULAR; SOFT TISSUE at 10:32

## 2024-04-18 NOTE — PROGRESS NOTES
[facility] Orthopaedic Surgery Established Patient Visit          Patient: Angelito Carter  YOB: 1935  Date of Encounter: 10/24/2023  PCP: Daylin Redmond APRN      Subjective     Chief Complaint   Patient presents with    Left Knee - Pain, Follow-up         History of Present Illness:     Angelito Carter is an 88-year-old male with follow-up evaluation exacerbation and return of bilateral knee pain left worse than right.  Patient was last seen in 2021 in relation to this to which she received intra-articular steroid injections as well as NSAID medication too much alleviation and reduction of pain and symptoms.  Patient states that he has had complications with his kidneys over the last several months and has had to discontinue his NSAID medication.  Overall patient reports pain to the medial aspect of the left knee with painful range of motion and activity.  Patient denies any other new complaints or specific injuries.  Reports no paresthesias.       Patient Active Problem List   Diagnosis    CA of prostate    ASCVD (arteriosclerotic cardiovascular disease)    History of ischemic cardiomyopathy    HTN (hypertension)    Dyslipidemia, goal LDL below 70    HX OF Paroxysmal a-fib    HX OF Chronic renal insufficiency    Intra-abdominal free air of unknown etiology    Gastroesophageal reflux disease without esophagitis    Encounter for screening for malignant neoplasm of colon     Past Medical History:   Diagnosis Date    Acute myocardial infarction     Adenocarcinoma     prostate gland    Arthritis     Heart attack     Hx of radiation therapy     Hypertension     Prostate cancer      Past Surgical History:   Procedure Laterality Date    BIOPSY PROSTATE NEEDLE / PUNCH / INCISIONAL      COLONOSCOPY N/A 2/14/2023    Procedure: COLONOSCOPY FOR SCREENING;  Surgeon: Cayla Strickland MD;  Location: [facility] OR;  Service: Gastroenterology;  Laterality: N/A;    CORONARY ARTERY BYPASS GRAFT      ENDOSCOPY  N/A 2/14/2023    Procedure: ESOPHAGOGASTRODUODENOSCOPY WITH BIOPSY;  Surgeon: Cayla Strickland MD;  Location: [facility];  Service: Gastroenterology;  Laterality: N/A;    KNEE SURGERY Right     OTHER SURGICAL HISTORY      Coronary Artery Triple Arterial Byupass Graft    PROSTATE SURGERY      PROSTATECTOMY      Robotic-assisted    SKIN CANCER EXCISION Left     removed from left ear    VASECTOMY       Social History     Occupational History    Not on file   Tobacco Use    Smoking status: Never    Smokeless tobacco: Never   Vaping Use    Vaping status: Never Used   Substance and Sexual Activity    Alcohol use: Yes     Comment: once in awhile    Drug use: Never    Sexual activity: Defer    Angelito Carter  reports that he has never smoked. He has never used smokeless tobacco.. I have educated him on the risk of diseases from using tobacco products such as cancer, COPD and heart disease.          Social History     Social History Narrative    Not on file     Family History   Problem Relation Age of Onset    Kidney disease Father     Heart disease Father     Heart attack Father     Kidney disease Mother     Heart disease Mother      Current Outpatient Medications   Medication Sig Dispense Refill    amLODIPine (NORVASC) 10 MG tablet Take 1 tablet by mouth Daily.      aspirin 81 MG EC tablet Take 1 tablet by mouth Daily.      calcium polycarbophil (FiberCon) 625 MG tablet Take 1 tablet by mouth Daily. 30 tablet 3    clarithromycin (BIAXIN) 500 MG tablet Take 1 tablet by mouth 2 (Two) Times a Day. 28 tablet 0    diphenhydrAMINE-acetaminophen (TYLENOL PM)  MG tablet per tablet Take 1 tablet by mouth At Night As Needed for Sleep.      lisinopril (PRINIVIL,ZESTRIL) 10 MG tablet Take 1 tablet by mouth Daily.      pantoprazole (PROTONIX) 40 MG EC tablet Take 1 tablet by mouth Daily. 30 tablet 5    simethicone (MYLICON) 80 MG chewable tablet Chew 1 tablet Daily As Needed for Flatulence (stomach pain / gas build up).    "    No current facility-administered medications for this visit.     Allergies   Allergen Reactions    Adhesive Tape Rash            Review of Systems   Constitutional: Negative.   HENT: Negative.     Eyes: Negative.    Cardiovascular: Negative.    Respiratory: Negative.     Endocrine: Negative.    Hematologic/Lymphatic: Negative.    Skin: Negative.    Musculoskeletal:         Pertinent positives listed in HPI   Gastrointestinal: Negative.    Genitourinary: Negative.    Neurological: Negative.    Psychiatric/Behavioral: Negative.     Allergic/Immunologic: Negative.          Objective      Vitals:    04/18/24 1000   Weight: 90.7 kg (199 lb 15.3 oz)   Height: 177.8 cm (70\")         Patient's Body mass index is 28.69 kg/m². indicating that he is obese (BMI >30). Obesity-related health conditions include the following:  Listed in PMH . Obesity is unchanged. BMI is is above average; BMI management plan is completed. We discussed portion control and increasing exercise..      Physical Exam  Vitals and nursing note reviewed.   Constitutional:       General: He is not in acute distress.     Appearance: Normal appearance. He is not ill-appearing.   HENT:      Head: Normocephalic and atraumatic.      Right Ear: External ear normal.      Left Ear: External ear normal.      Nose: Nose normal.      Mouth/Throat:      Mouth: Mucous membranes are moist.      Pharynx: Oropharynx is clear.   Eyes:      Extraocular Movements: Extraocular movements intact.      Conjunctiva/sclera: Conjunctivae normal.      Pupils: Pupils are equal, round, and reactive to light.   Cardiovascular:      Rate and Rhythm: Normal rate.      Pulses: Normal pulses.   Pulmonary:      Effort: Pulmonary effort is normal.   Abdominal:      General: There is no distension.   Musculoskeletal:      Cervical back: Normal range of motion. No rigidity.      Comments: Left knee on examination today reveals scant effusion with medial and lateral joint line tenderness to " palpation.  Patient has no significant laxity or instability upon anterior drawer or medial Julia's test.  Slight laxity of MCL with valgus stress.  Full flexion and extension.  Neurovascular status grossly intact left lower extremity   Skin:     General: Skin is warm and dry.      Capillary Refill: Capillary refill takes less than 2 seconds.   Neurological:      General: No focal deficit present.      Mental Status: He is alert and oriented to person, place, and time.   Psychiatric:         Mood and Affect: Mood normal.         Behavior: Behavior normal.           Radiology:      No radiology results for the last 30 days.          Assessment/Plan        ICD-10-CM ICD-9-CM   1. Primary osteoarthritis of left knee  M17.12 715.16          88-year-old male with notable bilateral glenohumeral joint osteoarthritis and rotator cuff tendinitis/impingement.  Patient responded well with the previous intra-articular glenohumeral joint injection. He has had return of his pain and symptoms.   He continues to have pain and symptoms consistent and concerning for rotator cuff pathology and questionable tear. As result of this the patient was provided today with a right shoulder injection with 80 mg Depo-Medrol with lidocaine block injected into the glenohumeral space of the right shoulder.  Patient tolerated this procedure well.  We discussed potential for further diagnostic imaging if no significant alleviation and continuation of pain and symptoms.        Large Joint Arthrocentesis: L knee  Date/Time: 4/18/2024 10:32 AM  Consent given by: patient  Site marked: site marked  Timeout: Immediately prior to procedure a time out was called to verify the correct patient, procedure, equipment, support staff and site/side marked as required   Supporting Documentation  Indications: pain and diagnostic evaluation   Procedure Details  Location: knee - L knee  Needle size: 25 G  Approach: anterolateral  Medications administered: 80 mg  methylPREDNISolone acetate 80 MG/ML; 5 mL lidocaine PF 1% 1 %  Patient tolerance: patient tolerated the procedure well with no immediate complications                        This document was signed by Washington Holt PA-C April 18, 2024    CC: Daylin Redmond APRN EMR Dragon/Transcription disclaimer:  Part of this note may be completed utilizing the dragon speech recognition software. This electronic transcription/translation of spoken language to printed text may contain grammatical errors, random word insertions, pronoun errors, and incomplete sentences or occasional consequences of the system due to software limitations, ambient noise, and hardware issues.  Any questions or concerns about the content, text, or information contained within the body of this dictation should be directly addressed to the physician for clarification.

## 2024-08-30 ENCOUNTER — TELEPHONE (OUTPATIENT)
Dept: GASTROENTEROLOGY | Facility: CLINIC | Age: 89
End: 2024-08-30
Payer: MEDICARE

## 2024-09-03 DIAGNOSIS — K22.70 BARRETT'S ESOPHAGUS WITHOUT DYSPLASIA: ICD-10-CM

## 2024-09-03 RX ORDER — PANTOPRAZOLE SODIUM 40 MG/1
40 TABLET, DELAYED RELEASE ORAL DAILY
Qty: 90 TABLET | Refills: 3 | Status: SHIPPED | OUTPATIENT
Start: 2024-09-03

## 2024-10-28 ENCOUNTER — OFFICE VISIT (OUTPATIENT)
Dept: ORTHOPEDIC SURGERY | Facility: CLINIC | Age: 89
End: 2024-10-28
Payer: MEDICARE

## 2024-10-28 VITALS — WEIGHT: 199 LBS | HEIGHT: 70 IN | BODY MASS INDEX: 28.49 KG/M2

## 2024-10-28 DIAGNOSIS — M17.12 PRIMARY OSTEOARTHRITIS OF LEFT KNEE: Primary | ICD-10-CM

## 2024-10-28 RX ORDER — LIDOCAINE HYDROCHLORIDE 10 MG/ML
5 INJECTION, SOLUTION EPIDURAL; INFILTRATION; INTRACAUDAL; PERINEURAL
Status: COMPLETED | OUTPATIENT
Start: 2024-10-28 | End: 2024-10-28

## 2024-10-28 RX ORDER — METHYLPREDNISOLONE ACETATE 80 MG/ML
80 INJECTION, SUSPENSION INTRA-ARTICULAR; INTRALESIONAL; INTRAMUSCULAR; SOFT TISSUE
Status: COMPLETED | OUTPATIENT
Start: 2024-10-28 | End: 2024-10-28

## 2024-10-28 RX ADMIN — METHYLPREDNISOLONE ACETATE 80 MG: 80 INJECTION, SUSPENSION INTRA-ARTICULAR; INTRALESIONAL; INTRAMUSCULAR; SOFT TISSUE at 10:17

## 2024-10-28 RX ADMIN — LIDOCAINE HYDROCHLORIDE 5 ML: 10 INJECTION, SOLUTION EPIDURAL; INFILTRATION; INTRACAUDAL; PERINEURAL at 10:17

## 2024-10-28 NOTE — PROGRESS NOTES
[facility] [place] [facility] Established Patient Visit          Patient: Angelito Carter  YOB: 1935  Date of Encounter: 10/28/2024  PCP: Daylin Redmond APRN      Subjective     Chief Complaint   Patient presents with    Left Knee - Follow-up, Pain         History of Present Illness:     Angelito Carter is an 88-year-old male with follow-up evaluation exacerbation and return of bilateral knee pain left worse than right.  Overall patient reports pain to the medial aspect of the left knee with painful range of motion and activity as well as stiffness.  Patient denies any other new complaints or specific injuries.  Reports no paresthesias.       Patient Active Problem List   Diagnosis    CA of prostate    ASCVD (arteriosclerotic cardiovascular disease)    History of ischemic cardiomyopathy    HTN (hypertension)    Dyslipidemia, goal LDL below 70    HX OF Paroxysmal a-fib    HX OF Chronic renal insufficiency    Intra-abdominal free air of unknown etiology    Gastroesophageal reflux disease without esophagitis    Encounter for screening for malignant neoplasm of colon     Past Medical History:   Diagnosis Date    Acute myocardial infarction     Adenocarcinoma     prostate gland    Arthritis     Heart attack     Hx of radiation therapy     Hypertension     Prostate cancer      Past Surgical History:   Procedure Laterality Date    BIOPSY PROSTATE NEEDLE / PUNCH / INCISIONAL      COLONOSCOPY N/A 2/14/2023    Procedure: COLONOSCOPY FOR SCREENING;  Surgeon: Cayla Strickland MD;  Location: [facility] OR;  Service: Gastroenterology;  Laterality: N/A;    CORONARY ARTERY BYPASS GRAFT      ENDOSCOPY N/A 2/14/2023    Procedure: ESOPHAGOGASTRODUODENOSCOPY WITH BIOPSY;  Surgeon: Cayla Strickladn MD;  Location: [facility] OR;  Service: Gastroenterology;  Laterality: N/A;    KNEE SURGERY Right     OTHER SURGICAL HISTORY      Coronary Artery Triple Arterial Byupass Graft    PROSTATE SURGERY      PROSTATECTOMY       Robotic-assisted    SKIN CANCER EXCISION Left     removed from left ear    VASECTOMY       Social History     Occupational History    Not on file   Tobacco Use    Smoking status: Never    Smokeless tobacco: Never   Vaping Use    Vaping status: Never Used   Substance and Sexual Activity    Alcohol use: Yes     Comment: once in awhile    Drug use: Never    Sexual activity: Defer    Angelito Carter  reports that he has never smoked. He has never used smokeless tobacco.. I have educated him on the risk of diseases from using tobacco products such as cancer, COPD and heart disease.          Social History     Social History Narrative    Not on file     Family History   Problem Relation Age of Onset    Kidney disease Father     Heart disease Father     Heart attack Father     Kidney disease Mother     Heart disease Mother      Current Outpatient Medications   Medication Sig Dispense Refill    amLODIPine (NORVASC) 10 MG tablet Take 1 tablet by mouth Daily.      aspirin 81 MG EC tablet Take 1 tablet by mouth Daily.      calcium polycarbophil (FiberCon) 625 MG tablet Take 1 tablet by mouth Daily. 30 tablet 3    clarithromycin (BIAXIN) 500 MG tablet Take 1 tablet by mouth 2 (Two) Times a Day. 28 tablet 0    diphenhydrAMINE-acetaminophen (TYLENOL PM)  MG tablet per tablet Take 1 tablet by mouth At Night As Needed for Sleep.      lisinopril (PRINIVIL,ZESTRIL) 10 MG tablet Take 1 tablet by mouth Daily.      pantoprazole (PROTONIX) 40 MG EC tablet Take 1 tablet by mouth Daily. 90 tablet 3    simethicone (MYLICON) 80 MG chewable tablet Chew 1 tablet Daily As Needed for Flatulence (stomach pain / gas build up).       No current facility-administered medications for this visit.     Allergies   Allergen Reactions    Adhesive Tape Rash            Review of Systems   Constitutional: Negative.   HENT: Negative.     Eyes: Negative.    Cardiovascular: Negative.    Respiratory: Negative.     Endocrine: Negative.   "  Hematologic/Lymphatic: Negative.    Skin: Negative.    Musculoskeletal:         Pertinent positives listed in HPI   Gastrointestinal: Negative.    Genitourinary: Negative.    Neurological: Negative.    Psychiatric/Behavioral: Negative.     Allergic/Immunologic: Negative.          Objective      Vitals:    10/28/24 0956   Weight: 90.3 kg (199 lb)   Height: 177.8 cm (70\")         Patient's Body mass index is 28.55 kg/m². indicating that he is obese (BMI >30). Obesity-related health conditions include the following:  Listed in PMH . Obesity is unchanged. BMI is is above average; BMI management plan is completed. We discussed portion control and increasing exercise..      Physical Exam  Vitals and nursing note reviewed.   Constitutional:       General: He is not in acute distress.     Appearance: Normal appearance. He is not ill-appearing.   HENT:      Head: Normocephalic and atraumatic.      Right Ear: External ear normal.      Left Ear: External ear normal.      Nose: Nose normal.      Mouth/Throat:      Mouth: Mucous membranes are moist.      Pharynx: Oropharynx is clear.   Eyes:      Extraocular Movements: Extraocular movements intact.      Conjunctiva/sclera: Conjunctivae normal.      Pupils: Pupils are equal, round, and reactive to light.   Cardiovascular:      Rate and Rhythm: Normal rate.      Pulses: Normal pulses.   Pulmonary:      Effort: Pulmonary effort is normal.   Abdominal:      General: There is no distension.   Musculoskeletal:      Cervical back: Normal range of motion. No rigidity.      Comments: Left knee on examination today reveals scant effusion with medial and lateral joint line tenderness to palpation.  Patient has no significant laxity or instability upon anterior drawer or medial Julia's test.  Slight laxity of MCL with valgus stress.  Full flexion and extension.  Neurovascular status grossly intact left lower extremity   Skin:     General: Skin is warm and dry.      Capillary Refill: " Capillary refill takes less than 2 seconds.   Neurological:      General: No focal deficit present.      Mental Status: He is alert and oriented to person, place, and time.   Psychiatric:         Mood and Affect: Mood normal.         Behavior: Behavior normal.           Radiology:      No radiology results for the last 30 days.          Assessment/Plan        ICD-10-CM ICD-9-CM   1. Primary osteoarthritis of left knee  M17.12 715.16            88-year-old male with ongoing notable left knee osteoarthritis.  The patient has had return of pain symptoms with dull throbbing aching sensation and difficulty upon range of motion weightbearing.  The patient has tolerated conservative treatment options in the past as result was provided today with a left knee aspiration yielding 20 cc of serous fluid with subsequent intra-articular steroid injection 80 mg Depo-Medrol with lidocaine block.  Patient tolerated this procedure well.  He was instructed to return back in 4 weeks for further evaluation of the efficacy of the conservative treatment.        Large Joint Arthrocentesis: L knee  Date/Time: 10/28/2024 10:17 AM  Consent given by: patient  Site marked: site marked  Timeout: Immediately prior to procedure a time out was called to verify the correct patient, procedure, equipment, support staff and site/side marked as required   Supporting Documentation  Indications: pain and diagnostic evaluation   Procedure Details  Location: knee - L knee  Needle size: 25 G  Approach: anterolateral  Medications administered: 80 mg methylPREDNISolone acetate 80 MG/ML; 5 mL lidocaine PF 1% 1 %  Patient tolerance: patient tolerated the procedure well with no immediate complications                        This document was signed by Washington Holt PA-C October 28, 2024    CC: Daylin Redmond APRN EMR Dragon/Transcription disclaimer:  Part of this note may be completed utilizing the dragon speech recognition software. This  electronic transcription/translation of spoken language to printed text may contain grammatical errors, random word insertions, pronoun errors, and incomplete sentences or occasional consequences of the system due to software limitations, ambient noise, and hardware issues.  Any questions or concerns about the content, text, or information contained within the body of this dictation should be directly addressed to the physician for clarification.

## 2024-11-18 ENCOUNTER — OFFICE VISIT (OUTPATIENT)
Dept: ORTHOPEDIC SURGERY | Facility: CLINIC | Age: 89
End: 2024-11-18
Payer: MEDICARE

## 2024-11-18 VITALS — HEIGHT: 70 IN | BODY MASS INDEX: 28.49 KG/M2 | WEIGHT: 199 LBS

## 2024-11-18 DIAGNOSIS — M17.12 PRIMARY OSTEOARTHRITIS OF LEFT KNEE: Primary | ICD-10-CM

## 2024-11-18 PROCEDURE — 99213 OFFICE O/P EST LOW 20 MIN: CPT | Performed by: PHYSICIAN ASSISTANT

## 2024-11-18 NOTE — PROGRESS NOTES
[facility] [place] [facility] Established Patient Visit          Patient: Angelito Carter  YOB: 1935  Date of Encounter: 11/18/2024  PCP: Daylin Redmond APRN      Subjective     Chief Complaint   Patient presents with    Left Knee - Follow-up, Pain         History of Present Illness:     Angelito Carter is an 89-year-old male with follow-up evaluation bilateral knee pain osteoarthritis.  The patient's last office visit he received aspiration and injection with me to which she reports resolution of pain and symptoms near complete alleviation.  He states that yesterday he had a small twinge of pain that has been short-lived revealing no complications.  He has been able to hunt and walk in the woods without any significant exacerbation.  Denies any other new complaints.          Patient Active Problem List   Diagnosis    CA of prostate    ASCVD (arteriosclerotic cardiovascular disease)    History of ischemic cardiomyopathy    HTN (hypertension)    Dyslipidemia, goal LDL below 70    HX OF Paroxysmal a-fib    HX OF Chronic renal insufficiency    Intra-abdominal free air of unknown etiology    Gastroesophageal reflux disease without esophagitis    Encounter for screening for malignant neoplasm of colon     Past Medical History:   Diagnosis Date    Acute myocardial infarction     Adenocarcinoma     prostate gland    Arthritis     Heart attack     Hx of radiation therapy     Hypertension     Prostate cancer      Past Surgical History:   Procedure Laterality Date    BIOPSY PROSTATE NEEDLE / PUNCH / INCISIONAL      COLONOSCOPY N/A 2/14/2023    Procedure: COLONOSCOPY FOR SCREENING;  Surgeon: Cayla Strickland MD;  Location: [facility];  Service: Gastroenterology;  Laterality: N/A;    CORONARY ARTERY BYPASS GRAFT      ENDOSCOPY N/A 2/14/2023    Procedure: ESOPHAGOGASTRODUODENOSCOPY WITH BIOPSY;  Surgeon: Cayla Strickland MD;  Location: [facility];  Service: Gastroenterology;  Laterality: N/A;     KNEE SURGERY Right     OTHER SURGICAL HISTORY      Coronary Artery Triple Arterial Byupass Graft    PROSTATE SURGERY      PROSTATECTOMY      Robotic-assisted    SKIN CANCER EXCISION Left     removed from left ear    VASECTOMY       Social History     Occupational History    Not on file   Tobacco Use    Smoking status: Never    Smokeless tobacco: Never   Vaping Use    Vaping status: Never Used   Substance and Sexual Activity    Alcohol use: Yes     Comment: once in awhile    Drug use: Never    Sexual activity: Defer    Angelito Carter  reports that he has never smoked. He has never used smokeless tobacco.. I have educated him on the risk of diseases from using tobacco products such as cancer, COPD and heart disease.          Social History     Social History Narrative    Not on file     Family History   Problem Relation Age of Onset    Kidney disease Father     Heart disease Father     Heart attack Father     Kidney disease Mother     Heart disease Mother      Current Outpatient Medications   Medication Sig Dispense Refill    amLODIPine (NORVASC) 10 MG tablet Take 1 tablet by mouth Daily.      aspirin 81 MG EC tablet Take 1 tablet by mouth Daily.      calcium polycarbophil (FiberCon) 625 MG tablet Take 1 tablet by mouth Daily. 30 tablet 3    clarithromycin (BIAXIN) 500 MG tablet Take 1 tablet by mouth 2 (Two) Times a Day. 28 tablet 0    diphenhydrAMINE-acetaminophen (TYLENOL PM)  MG tablet per tablet Take 1 tablet by mouth At Night As Needed for Sleep.      lisinopril (PRINIVIL,ZESTRIL) 10 MG tablet Take 1 tablet by mouth Daily.      pantoprazole (PROTONIX) 40 MG EC tablet Take 1 tablet by mouth Daily. 90 tablet 3    simethicone (MYLICON) 80 MG chewable tablet Chew 1 tablet Daily As Needed for Flatulence (stomach pain / gas build up).       No current facility-administered medications for this visit.     Allergies   Allergen Reactions    Adhesive Tape Rash            Review of Systems   Constitutional: Negative.  "  HENT: Negative.     Eyes: Negative.    Cardiovascular: Negative.    Respiratory: Negative.     Endocrine: Negative.    Hematologic/Lymphatic: Negative.    Skin: Negative.    Musculoskeletal:         Pertinent positives listed in HPI   Gastrointestinal: Negative.    Genitourinary: Negative.    Neurological: Negative.    Psychiatric/Behavioral: Negative.     Allergic/Immunologic: Negative.          Objective      Vitals:    11/18/24 1002   Weight: 90.3 kg (199 lb)   Height: 177.8 cm (70\")         Patient's Body mass index is 28.55 kg/m². indicating that he is obese (BMI >30). Obesity-related health conditions include the following:  Listed in PMH . Obesity is unchanged. BMI is is above average; BMI management plan is completed. We discussed portion control and increasing exercise..      Physical Exam  Vitals and nursing note reviewed.   Constitutional:       General: He is not in acute distress.     Appearance: Normal appearance. He is not ill-appearing.   HENT:      Head: Normocephalic and atraumatic.      Right Ear: External ear normal.      Left Ear: External ear normal.      Nose: Nose normal.      Mouth/Throat:      Mouth: Mucous membranes are moist.      Pharynx: Oropharynx is clear.   Eyes:      Extraocular Movements: Extraocular movements intact.      Conjunctiva/sclera: Conjunctivae normal.      Pupils: Pupils are equal, round, and reactive to light.   Cardiovascular:      Rate and Rhythm: Normal rate.      Pulses: Normal pulses.   Pulmonary:      Effort: Pulmonary effort is normal.   Abdominal:      General: There is no distension.   Musculoskeletal:      Cervical back: Normal range of motion. No rigidity.      Comments: Left knee on examination today reveals no effusion with medial and lateral joint line tenderness to palpation.  Patient has no significant laxity or instability upon anterior drawer or medial Julia's test.  Slight laxity of MCL with valgus stress.  Full flexion and extension.  " Neurovascular status grossly intact left lower extremity   Skin:     General: Skin is warm and dry.      Capillary Refill: Capillary refill takes less than 2 seconds.   Neurological:      General: No focal deficit present.      Mental Status: He is alert and oriented to person, place, and time.   Psychiatric:         Mood and Affect: Mood normal.         Behavior: Behavior normal.                     Assessment/Plan        ICD-10-CM ICD-9-CM   1. Primary osteoarthritis of left knee  M17.12 715.16         89-year-old male with notable bilateral knee pain osteoarthritis left knee more symptomatic than right.  Patient tolerated previous aspiration and injection well with resolution of effusion and reduction of pain and symptoms.  The patient was encouraged to return back on an as-needed basis upon any complication or worsening of pain/symptoms.  Ultimately his goal is to exhaust all conservative options to prevent total knee arthroplasty.                       This document was signed by Washington Holt PA-C November 18, 2024    CC: Daylin Redmond APRN EMR Dragon/Transcription disclaimer:  Part of this note may be completed utilizing the dragon speech recognition software. This electronic transcription/translation of spoken language to printed text may contain grammatical errors, random word insertions, pronoun errors, and incomplete sentences or occasional consequences of the system due to software limitations, ambient noise, and hardware issues.  Any questions or concerns about the content, text, or information contained within the body of this dictation should be directly addressed to the physician for clarification.

## 2025-03-03 ENCOUNTER — OFFICE VISIT (OUTPATIENT)
Dept: ORTHOPEDIC SURGERY | Facility: CLINIC | Age: 89
End: 2025-03-03
Payer: MEDICARE

## 2025-03-03 VITALS — WEIGHT: 199 LBS | HEIGHT: 70 IN | BODY MASS INDEX: 28.49 KG/M2

## 2025-03-03 DIAGNOSIS — M17.0 BILATERAL PRIMARY OSTEOARTHRITIS OF KNEE: ICD-10-CM

## 2025-03-03 DIAGNOSIS — M17.12 PRIMARY OSTEOARTHRITIS OF LEFT KNEE: Primary | ICD-10-CM

## 2025-03-03 PROCEDURE — 20610 DRAIN/INJ JOINT/BURSA W/O US: CPT | Performed by: PHYSICIAN ASSISTANT

## 2025-03-03 PROCEDURE — 99213 OFFICE O/P EST LOW 20 MIN: CPT | Performed by: PHYSICIAN ASSISTANT

## 2025-03-03 RX ORDER — LIDOCAINE HYDROCHLORIDE 10 MG/ML
5 INJECTION, SOLUTION EPIDURAL; INFILTRATION; INTRACAUDAL; PERINEURAL
Status: COMPLETED | OUTPATIENT
Start: 2025-03-03 | End: 2025-03-03

## 2025-03-03 RX ORDER — METHYLPREDNISOLONE ACETATE 40 MG/ML
40 INJECTION, SUSPENSION INTRA-ARTICULAR; INTRALESIONAL; INTRAMUSCULAR; SOFT TISSUE
Status: COMPLETED | OUTPATIENT
Start: 2025-03-03 | End: 2025-03-03

## 2025-03-03 RX ADMIN — METHYLPREDNISOLONE ACETATE 40 MG: 40 INJECTION, SUSPENSION INTRA-ARTICULAR; INTRALESIONAL; INTRAMUSCULAR; SOFT TISSUE at 09:22

## 2025-03-03 RX ADMIN — LIDOCAINE HYDROCHLORIDE 5 ML: 10 INJECTION, SOLUTION EPIDURAL; INFILTRATION; INTRACAUDAL; PERINEURAL at 09:22

## 2025-03-03 NOTE — PROGRESS NOTES
[facility] [place] [facility] Established Patient Visit          Patient: Angelito Carter  YOB: 1935  Date of Encounter: 3/3/2025  PCP: Daylin Redmond APRN      Subjective     Chief Complaint   Patient presents with    Left Knee - Follow-up, Pain, Edema         History of Present Illness:     Angelito Carter is an 89-year-old male with follow-up evaluation bilateral knee pain osteoarthritis.  The patient's previous office visit he received aspiration and injection in October 2024. He reports resolution of pain and symptoms near complete alleviation.  He states that over the last week he has began to have return of pain and symptoms with swelling intermittently worse pain in the left knee.  No other new complaints.  Denies any paresthesias         Patient Active Problem List   Diagnosis    CA of prostate    ASCVD (arteriosclerotic cardiovascular disease)    History of ischemic cardiomyopathy    HTN (hypertension)    Dyslipidemia, goal LDL below 70    HX OF Paroxysmal a-fib    HX OF Chronic renal insufficiency    Intra-abdominal free air of unknown etiology    Gastroesophageal reflux disease without esophagitis    Encounter for screening for malignant neoplasm of colon     Past Medical History:   Diagnosis Date    Acute myocardial infarction     Adenocarcinoma     prostate gland    Arthritis     Heart attack     Hx of radiation therapy     Hypertension     Prostate cancer      Past Surgical History:   Procedure Laterality Date    BIOPSY PROSTATE NEEDLE / PUNCH / INCISIONAL      COLONOSCOPY N/A 2/14/2023    Procedure: COLONOSCOPY FOR SCREENING;  Surgeon: Cayla Strickland MD;  Location: [facility];  Service: Gastroenterology;  Laterality: N/A;    CORONARY ARTERY BYPASS GRAFT      ENDOSCOPY N/A 2/14/2023    Procedure: ESOPHAGOGASTRODUODENOSCOPY WITH BIOPSY;  Surgeon: Cayla Strickland MD;  Location: [facility];  Service: Gastroenterology;  Laterality: N/A;    KNEE SURGERY Right     OTHER  SURGICAL HISTORY      Coronary Artery Triple Arterial Byupass Graft    PROSTATE SURGERY      PROSTATECTOMY      Robotic-assisted    SKIN CANCER EXCISION Left     removed from left ear    VASECTOMY       Social History     Occupational History    Not on file   Tobacco Use    Smoking status: Never    Smokeless tobacco: Never   Vaping Use    Vaping status: Never Used   Substance and Sexual Activity    Alcohol use: Yes     Comment: once in awhile    Drug use: Never    Sexual activity: Defer    Angelito Carter  reports that he has never smoked. He has never used smokeless tobacco.. I have educated him on the risk of diseases from using tobacco products such as cancer, COPD and heart disease.          Social History     Social History Narrative    Not on file     Family History   Problem Relation Age of Onset    Kidney disease Father     Heart disease Father     Heart attack Father     Kidney disease Mother     Heart disease Mother      Current Outpatient Medications   Medication Sig Dispense Refill    amLODIPine (NORVASC) 10 MG tablet Take 1 tablet by mouth Daily.      aspirin 81 MG EC tablet Take 1 tablet by mouth Daily.      calcium polycarbophil (FiberCon) 625 MG tablet Take 1 tablet by mouth Daily. 30 tablet 3    diphenhydrAMINE-acetaminophen (TYLENOL PM)  MG tablet per tablet Take 1 tablet by mouth At Night As Needed for Sleep.      lisinopril (PRINIVIL,ZESTRIL) 10 MG tablet Take 1 tablet by mouth Daily.      pantoprazole (PROTONIX) 40 MG EC tablet Take 1 tablet by mouth Daily. 90 tablet 3    simethicone (MYLICON) 80 MG chewable tablet Chew 1 tablet Daily As Needed for Flatulence (stomach pain / gas build up).      clarithromycin (BIAXIN) 500 MG tablet Take 1 tablet by mouth 2 (Two) Times a Day. (Patient not taking: Reported on 3/3/2025) 28 tablet 0     No current facility-administered medications for this visit.     Allergies   Allergen Reactions    Adhesive Tape Rash            Review of Systems  "  Constitutional: Negative.   HENT: Negative.     Eyes: Negative.    Cardiovascular: Negative.    Respiratory: Negative.     Endocrine: Negative.    Hematologic/Lymphatic: Negative.    Skin: Negative.    Musculoskeletal:         Pertinent positives listed in HPI   Gastrointestinal: Negative.    Genitourinary: Negative.    Neurological: Negative.    Psychiatric/Behavioral: Negative.     Allergic/Immunologic: Negative.          Objective      Vitals:    03/03/25 0855   Weight: 90.3 kg (199 lb)   Height: 177.8 cm (70\")         Patient's Body mass index is 28.55 kg/m². indicating that he is obese (BMI >30). Obesity-related health conditions include the following:  Listed in PMH . Obesity is unchanged. BMI is is above average; BMI management plan is completed. We discussed portion control and increasing exercise..      Physical Exam  Vitals and nursing note reviewed.   Constitutional:       General: He is not in acute distress.     Appearance: Normal appearance. He is not ill-appearing.   HENT:      Head: Normocephalic and atraumatic.      Right Ear: External ear normal.      Left Ear: External ear normal.      Nose: Nose normal.      Mouth/Throat:      Mouth: Mucous membranes are moist.      Pharynx: Oropharynx is clear.   Eyes:      Extraocular Movements: Extraocular movements intact.      Conjunctiva/sclera: Conjunctivae normal.      Pupils: Pupils are equal, round, and reactive to light.   Cardiovascular:      Rate and Rhythm: Normal rate.      Pulses: Normal pulses.   Pulmonary:      Effort: Pulmonary effort is normal.   Abdominal:      General: There is no distension.   Musculoskeletal:      Cervical back: Normal range of motion. No rigidity.      Comments: Left knee on examination today reveals scant effusion with medial and lateral joint line tenderness to palpation.  Patient has no significant laxity or instability upon anterior drawer or medial Julia's test.  Slight laxity of MCL with valgus stress.  Full " flexion and extension.  Neurovascular status grossly intact left lower extremity   Skin:     General: Skin is warm and dry.      Capillary Refill: Capillary refill takes less than 2 seconds.   Neurological:      General: No focal deficit present.      Mental Status: He is alert and oriented to person, place, and time.   Psychiatric:         Mood and Affect: Mood normal.         Behavior: Behavior normal.                     Assessment/Plan        ICD-10-CM ICD-9-CM   1. Primary osteoarthritis of left knee  M17.12 715.16   2. Bilateral primary osteoarthritis of knee  M17.0 715.16       89-year-old male with notable bilateral knee pain osteoarthritis left knee remains more symptomatic than right.  The patient has tolerated previous aspirations and injections well with the most recent return of pain and symptoms.  As result of the notable intra-articular effusion the patient was provided today with an aspiration yielding 10 cc of blood-tinged serous fluid with subsequent injection with 40 mg Depo-Medrol with lidocaine block injected into the intra-articular space of the left knee.  Patient tolerated this procedure well.  He was instructed to return back in 4 weeks for further evaluation to make efficacy of the conservative treatment.  Ultimately the goal is to exhaust all conservative treatment options before discussing surgical invention.  Patient is agreeable to current treatment plan of care.           Large Joint Arthrocentesis: L knee  Date/Time: 3/3/2025 9:22 AM  Consent given by: patient  Site marked: site marked  Supporting Documentation  Indications: pain and joint swelling   Procedure Details  Location: knee - L knee  Needle size: 18 G  Approach: superior  Medications administered: 5 mL lidocaine PF 1% 1 %; 40 mg methylPREDNISolone acetate 40 MG/ML  Aspirate amount: 10 mL  Aspirate: blood-tinged and serous  Patient tolerance: patient tolerated the procedure well with no immediate  complications                        This document was signed by Washington Holt PA-C March 3, 2025    CC: Daylin Redmond APRN EMR Dragon/Transcription disclaimer:  Part of this note may be completed utilizing the dragon speech recognition software. This electronic transcription/translation of spoken language to printed text may contain grammatical errors, random word insertions, pronoun errors, and incomplete sentences or occasional consequences of the system due to software limitations, ambient noise, and hardware issues.  Any questions or concerns about the content, text, or information contained within the body of this dictation should be directly addressed to the physician for clarification.

## 2025-04-25 ENCOUNTER — OFFICE VISIT (OUTPATIENT)
Dept: ORTHOPEDIC SURGERY | Facility: CLINIC | Age: 89
End: 2025-04-25
Payer: MEDICARE

## 2025-04-25 VITALS — WEIGHT: 199 LBS | BODY MASS INDEX: 28.49 KG/M2 | HEIGHT: 70 IN

## 2025-04-25 DIAGNOSIS — M17.0 BILATERAL PRIMARY OSTEOARTHRITIS OF KNEE: Primary | ICD-10-CM

## 2025-04-25 PROCEDURE — 1160F RVW MEDS BY RX/DR IN RCRD: CPT | Performed by: PHYSICIAN ASSISTANT

## 2025-04-25 PROCEDURE — 99213 OFFICE O/P EST LOW 20 MIN: CPT | Performed by: PHYSICIAN ASSISTANT

## 2025-04-25 PROCEDURE — 1159F MED LIST DOCD IN RCRD: CPT | Performed by: PHYSICIAN ASSISTANT

## 2025-04-25 RX ORDER — SULFAMETHOXAZOLE AND TRIMETHOPRIM 800; 160 MG/1; MG/1
TABLET ORAL
COMMUNITY
Start: 2025-04-21

## 2025-04-25 NOTE — PROGRESS NOTES
[facility] [place] [facility] Established Patient Visit          Patient: Angelito Carter  YOB: 1935  Date of Encounter: 4/25/2025  PCP: Daylin Redmond APRN      Subjective     Chief Complaint   Patient presents with    Left Knee - Follow-up, Edema, Pain         History of Present Illness:     Angelito Carter is an 89-year-old male with follow-up evaluation bilateral knee pain osteoarthritis.  Patient has responded fairly well with previous injections and aspirations with return of worsening pain in the left knee with instability at times.  He reports difficulty upon prolonged standing or sitting or with heavy manual labor.  He tends to have trouble getting on and off of his tractor.  No other new complaints.  Denies any paresthesias        Patient Active Problem List   Diagnosis    CA of prostate    ASCVD (arteriosclerotic cardiovascular disease)    History of ischemic cardiomyopathy    HTN (hypertension)    Dyslipidemia, goal LDL below 70    HX OF Paroxysmal a-fib    HX OF Chronic renal insufficiency    Intra-abdominal free air of unknown etiology    Gastroesophageal reflux disease without esophagitis    Encounter for screening for malignant neoplasm of colon     Past Medical History:   Diagnosis Date    Acute myocardial infarction     Adenocarcinoma     prostate gland    Arthritis     Heart attack     Hx of radiation therapy     Hypertension     Prostate cancer      Past Surgical History:   Procedure Laterality Date    BIOPSY PROSTATE NEEDLE / PUNCH / INCISIONAL      COLONOSCOPY N/A 2/14/2023    Procedure: COLONOSCOPY FOR SCREENING;  Surgeon: Cayla Strickland MD;  Location: [facility];  Service: Gastroenterology;  Laterality: N/A;    CORONARY ARTERY BYPASS GRAFT      ENDOSCOPY N/A 2/14/2023    Procedure: ESOPHAGOGASTRODUODENOSCOPY WITH BIOPSY;  Surgeon: Cayla Strickland MD;  Location: [facility];  Service: Gastroenterology;  Laterality: N/A;    KNEE SURGERY Right     OTHER  SURGICAL HISTORY      Coronary Artery Triple Arterial Byupass Graft    PROSTATE SURGERY      PROSTATECTOMY      Robotic-assisted    SKIN CANCER EXCISION Left     removed from left ear    VASECTOMY       Social History     Occupational History    Not on file   Tobacco Use    Smoking status: Never    Smokeless tobacco: Never   Vaping Use    Vaping status: Never Used   Substance and Sexual Activity    Alcohol use: Yes     Comment: once in awhile    Drug use: Never    Sexual activity: Defer    Angelito Carter  reports that he has never smoked. He has never used smokeless tobacco.. I have educated him on the risk of diseases from using tobacco products such as cancer, COPD and heart disease.          Social History     Social History Narrative    Not on file     Family History   Problem Relation Age of Onset    Kidney disease Father     Heart disease Father     Heart attack Father     Kidney disease Mother     Heart disease Mother      Current Outpatient Medications   Medication Sig Dispense Refill    amLODIPine (NORVASC) 10 MG tablet Take 1 tablet by mouth Daily.      aspirin 81 MG EC tablet Take 1 tablet by mouth Daily.      calcium polycarbophil (FiberCon) 625 MG tablet Take 1 tablet by mouth Daily. 30 tablet 3    diphenhydrAMINE-acetaminophen (TYLENOL PM)  MG tablet per tablet Take 1 tablet by mouth At Night As Needed for Sleep.      lisinopril (PRINIVIL,ZESTRIL) 10 MG tablet Take 1 tablet by mouth Daily.      pantoprazole (PROTONIX) 40 MG EC tablet Take 1 tablet by mouth Daily. 90 tablet 3    simethicone (MYLICON) 80 MG chewable tablet Chew 1 tablet Daily As Needed for Flatulence (stomach pain / gas build up).      sulfamethoxazole-trimethoprim (BACTRIM DS,SEPTRA DS) 800-160 MG per tablet  (Patient not taking: Reported on 5/9/2025)       No current facility-administered medications for this visit.     Allergies   Allergen Reactions    Adhesive Tape Rash            Review of Systems   Constitutional: Negative.  "  HENT: Negative.     Eyes: Negative.    Cardiovascular: Negative.    Respiratory: Negative.     Endocrine: Negative.    Hematologic/Lymphatic: Negative.    Skin: Negative.    Musculoskeletal:         Pertinent positives listed in HPI   Gastrointestinal: Negative.    Genitourinary: Negative.    Neurological: Negative.    Psychiatric/Behavioral: Negative.     Allergic/Immunologic: Negative.          Objective      Vitals:    04/25/25 0852   Weight: 90.3 kg (199 lb)   Height: 177.8 cm (70\")         Patient's Body mass index is 28.55 kg/m². indicating that he is obese (BMI >30). Obesity-related health conditions include the following:  Listed in PMH . Obesity is unchanged. BMI is is above average; BMI management plan is completed. We discussed portion control and increasing exercise..      Physical Exam  Vitals and nursing note reviewed.   Constitutional:       General: He is not in acute distress.     Appearance: Normal appearance. He is not ill-appearing.   HENT:      Head: Normocephalic and atraumatic.      Right Ear: External ear normal.      Left Ear: External ear normal.      Nose: Nose normal.      Mouth/Throat:      Mouth: Mucous membranes are moist.      Pharynx: Oropharynx is clear.   Eyes:      Extraocular Movements: Extraocular movements intact.      Conjunctiva/sclera: Conjunctivae normal.      Pupils: Pupils are equal, round, and reactive to light.   Cardiovascular:      Rate and Rhythm: Normal rate.      Pulses: Normal pulses.   Pulmonary:      Effort: Pulmonary effort is normal.   Abdominal:      General: There is no distension.   Musculoskeletal:      Cervical back: Normal range of motion. No rigidity.      Comments: Left knee on examination today reveals scant effusion with medial and lateral joint line tenderness to palpation.  Patient has no significant laxity or instability upon anterior drawer or medial Julia's test.  Slight laxity of MCL with valgus stress.  Full flexion and extension.  " Neurovascular status grossly intact left lower extremity   Skin:     General: Skin is warm and dry.      Capillary Refill: Capillary refill takes less than 2 seconds.   Neurological:      General: No focal deficit present.      Mental Status: He is alert and oriented to person, place, and time.   Psychiatric:         Mood and Affect: Mood normal.         Behavior: Behavior normal.                     Assessment/Plan        ICD-10-CM ICD-9-CM   1. Bilateral primary osteoarthritis of knee  M17.0 715.16       89-year-old male with notable bilateral knee pain osteoarthritis left knee remains more symptomatic than right.  The patient has undergone previous aspirations and injections as well as continuation of progressive pain and symptoms.  He does receive several months relief out of injections.  Secondary to the instability and the laxity on exam today the patient was provided with a medial  brace left knee to be worn over the course the next 4 weeks when being active and with ambulation.  The patient is understanding of this and would like to exhaust all conservative treatment options before discussing surgical intervention.  The patient is agreeable to current treatment plan of care.  Follow-up in 2 weeks to discuss repeat injections.       Procedures                  This document was signed by Washington Holt PA-C April 25, 2025    CC: Daylin Redmond APRN EMR Dragon/Transcription disclaimer:  Part of this note may be completed utilizing the dragon speech recognition software. This electronic transcription/translation of spoken language to printed text may contain grammatical errors, random word insertions, pronoun errors, and incomplete sentences or occasional consequences of the system due to software limitations, ambient noise, and hardware issues.  Any questions or concerns about the content, text, or information contained within the body of this dictation should be directly addressed to the  physician for clarification.

## 2025-05-09 ENCOUNTER — OFFICE VISIT (OUTPATIENT)
Dept: ORTHOPEDIC SURGERY | Facility: CLINIC | Age: 89
End: 2025-05-09
Payer: MEDICARE

## 2025-05-09 VITALS — WEIGHT: 199 LBS | HEIGHT: 70 IN | BODY MASS INDEX: 28.49 KG/M2

## 2025-05-09 DIAGNOSIS — M17.0 BILATERAL PRIMARY OSTEOARTHRITIS OF KNEE: Primary | ICD-10-CM

## 2025-05-09 RX ADMIN — METHYLPREDNISOLONE ACETATE 40 MG: 40 INJECTION, SUSPENSION INTRA-ARTICULAR; INTRALESIONAL; INTRAMUSCULAR; SOFT TISSUE at 10:51

## 2025-05-09 RX ADMIN — LIDOCAINE HYDROCHLORIDE 5 ML: 10 INJECTION, SOLUTION EPIDURAL; INFILTRATION; INTRACAUDAL; PERINEURAL at 10:51

## 2025-05-27 RX ORDER — METHYLPREDNISOLONE ACETATE 40 MG/ML
40 INJECTION, SUSPENSION INTRA-ARTICULAR; INTRALESIONAL; INTRAMUSCULAR; SOFT TISSUE
Status: COMPLETED | OUTPATIENT
Start: 2025-05-09 | End: 2025-05-09

## 2025-05-27 RX ORDER — LIDOCAINE HYDROCHLORIDE 10 MG/ML
5 INJECTION, SOLUTION EPIDURAL; INFILTRATION; INTRACAUDAL; PERINEURAL
Status: COMPLETED | OUTPATIENT
Start: 2025-05-09 | End: 2025-05-09

## 2025-05-27 NOTE — PROGRESS NOTES
[facility] Established Patient Visit          Patient: Angelito Carter  YOB: 1935  Date of Encounter: 5/9/2025  PCP: Daylin Redmond APRN      Subjective     Chief Complaint   Patient presents with    Left Knee - Follow-up         History of Present Illness:     Angelito Carter is an 89-year-old male with follow-up evaluation bilateral knee pain osteoarthritis.  Patient has responded fairly well with previous injections and aspirations with return of worsening pain in the left knee with instability at times.  He reports difficulty upon prolonged standing or sitting or with heavy manual labor.  He tends to have trouble getting on and off of his tractor.  No other new complaints.  Denies any paresthesias        Patient Active Problem List   Diagnosis    CA of prostate    ASCVD (arteriosclerotic cardiovascular disease)    History of ischemic cardiomyopathy    HTN (hypertension)    Dyslipidemia, goal LDL below 70    HX OF Paroxysmal a-fib    HX OF Chronic renal insufficiency    Intra-abdominal free air of unknown etiology    Gastroesophageal reflux disease without esophagitis    Encounter for screening for malignant neoplasm of colon     Past Medical History:   Diagnosis Date    Acute myocardial infarction     Adenocarcinoma     prostate gland    Arthritis     Heart attack     Hx of radiation therapy     Hypertension     Prostate cancer      Past Surgical History:   Procedure Laterality Date    BIOPSY PROSTATE NEEDLE / PUNCH / INCISIONAL      COLONOSCOPY N/A 2/14/2023    Procedure: COLONOSCOPY FOR SCREENING;  Surgeon: Cayla Strickland MD;  Location: [facility];  Service: Gastroenterology;  Laterality: N/A;    CORONARY ARTERY BYPASS GRAFT      ENDOSCOPY N/A 2/14/2023    Procedure: ESOPHAGOGASTRODUODENOSCOPY WITH BIOPSY;  Surgeon: Cayla Strickland MD;  Location: [facility];  Service: Gastroenterology;  Laterality: N/A;    KNEE SURGERY Right     OTHER SURGICAL HISTORY       Coronary Artery Triple Arterial Byupass Graft    PROSTATE SURGERY      PROSTATECTOMY      Robotic-assisted    SKIN CANCER EXCISION Left     removed from left ear    VASECTOMY       Social History     Occupational History    Not on file   Tobacco Use    Smoking status: Never    Smokeless tobacco: Never   Vaping Use    Vaping status: Never Used   Substance and Sexual Activity    Alcohol use: Yes     Comment: once in awhile    Drug use: Never    Sexual activity: Defer    Angelito Carter  reports that he has never smoked. He has never used smokeless tobacco.. I have educated him on the risk of diseases from using tobacco products such as cancer, COPD and heart disease.          Social History     Social History Narrative    Not on file     Family History   Problem Relation Age of Onset    Kidney disease Father     Heart disease Father     Heart attack Father     Kidney disease Mother     Heart disease Mother      Current Outpatient Medications   Medication Sig Dispense Refill    amLODIPine (NORVASC) 10 MG tablet Take 1 tablet by mouth Daily.      aspirin 81 MG EC tablet Take 1 tablet by mouth Daily.      calcium polycarbophil (FiberCon) 625 MG tablet Take 1 tablet by mouth Daily. 30 tablet 3    diphenhydrAMINE-acetaminophen (TYLENOL PM)  MG tablet per tablet Take 1 tablet by mouth At Night As Needed for Sleep.      lisinopril (PRINIVIL,ZESTRIL) 10 MG tablet Take 1 tablet by mouth Daily.      pantoprazole (PROTONIX) 40 MG EC tablet Take 1 tablet by mouth Daily. 90 tablet 3    simethicone (MYLICON) 80 MG chewable tablet Chew 1 tablet Daily As Needed for Flatulence (stomach pain / gas build up).      sulfamethoxazole-trimethoprim (BACTRIM DS,SEPTRA DS) 800-160 MG per tablet  (Patient not taking: Reported on 5/9/2025)       No current facility-administered medications for this visit.     Allergies   Allergen Reactions    Adhesive Tape Rash            Review of Systems   Constitutional: Negative.   HENT: Negative.    "  Eyes: Negative.    Cardiovascular: Negative.    Respiratory: Negative.     Endocrine: Negative.    Hematologic/Lymphatic: Negative.    Skin: Negative.    Musculoskeletal:         Pertinent positives listed in HPI   Gastrointestinal: Negative.    Genitourinary: Negative.    Neurological: Negative.    Psychiatric/Behavioral: Negative.     Allergic/Immunologic: Negative.          Objective      Vitals:    05/09/25 0854   Weight: 90.3 kg (199 lb)   Height: 177.8 cm (70\")         Patient's Body mass index is 28.55 kg/m². indicating that he is obese (BMI >30). Obesity-related health conditions include the following:  Listed in PMH . Obesity is unchanged. BMI is is above average; BMI management plan is completed. We discussed portion control and increasing exercise..      Physical Exam  Vitals and nursing note reviewed.   Constitutional:       General: He is not in acute distress.     Appearance: Normal appearance. He is not ill-appearing.   HENT:      Head: Normocephalic and atraumatic.      Right Ear: External ear normal.      Left Ear: External ear normal.      Nose: Nose normal.      Mouth/Throat:      Mouth: Mucous membranes are moist.      Pharynx: Oropharynx is clear.   Eyes:      Extraocular Movements: Extraocular movements intact.      Conjunctiva/sclera: Conjunctivae normal.      Pupils: Pupils are equal, round, and reactive to light.   Cardiovascular:      Rate and Rhythm: Normal rate.      Pulses: Normal pulses.   Pulmonary:      Effort: Pulmonary effort is normal.   Abdominal:      General: There is no distension.   Musculoskeletal:      Cervical back: Normal range of motion. No rigidity.      Comments: Left knee on examination today reveals scant effusion with medial and lateral joint line tenderness to palpation.  Patient has no significant laxity or instability upon anterior drawer or medial Julia's test.  Slight laxity of MCL with valgus stress.  Full flexion and extension.  Neurovascular status " grossly intact left lower extremity   Skin:     General: Skin is warm and dry.      Capillary Refill: Capillary refill takes less than 2 seconds.   Neurological:      General: No focal deficit present.      Mental Status: He is alert and oriented to person, place, and time.   Psychiatric:         Mood and Affect: Mood normal.         Behavior: Behavior normal.                     Assessment/Plan      No diagnosis found.      89-year-old male with notable bilateral knee pain osteoarthritis left knee remains more symptomatic than right.  The patient has undergone previous aspirations and injections as well as continuation of progressive pain and symptoms.  He does receive several months relief out of injections.  Secondary to the instability and the laxity on exam today the patient was provided with a medial  brace left knee last office visit with some benefit.  Secondary to continuation of pain and symptoms the patient was provided with a left knee intra-articular steroid injection with 40 mg Depo-Medrol with lidocaine block injected into the intra-articular space of the left knee.  Patient tolerated this procedure well. The patient is understanding of this and would like to exhaust all conservative treatment options before discussing surgical intervention.        Large Joint Arthrocentesis: L knee  Date/Time: 5/9/2025 10:51 AM  Consent given by: patient  Site marked: site marked  Timeout: Immediately prior to procedure a time out was called to verify the correct patient, procedure, equipment, support staff and site/side marked as required   Supporting Documentation  Indications: pain and diagnostic evaluation   Procedure Details  Location: knee - L knee  Needle size: 25 G  Approach: anterolateral  Medications administered: 5 mL lidocaine PF 1% 1 %; 40 mg methylPREDNISolone acetate 40 MG/ML  Patient tolerance: patient tolerated the procedure well with no immediate complications                        This  document was signed by Washington Holt PA-C May 9, 2025    CC: Daylin Redmond APRN EMR Dragon/Transcription disclaimer:  Part of this note may be completed utilizing the dragon speech recognition software. This electronic transcription/translation of spoken language to printed text may contain grammatical errors, random word insertions, pronoun errors, and incomplete sentences or occasional consequences of the system due to software limitations, ambient noise, and hardware issues.  Any questions or concerns about the content, text, or information contained within the body of this dictation should be directly addressed to the physician for clarification.

## 2025-08-12 ENCOUNTER — OFFICE VISIT (OUTPATIENT)
Dept: ORTHOPEDIC SURGERY | Facility: CLINIC | Age: 89
End: 2025-08-12
Payer: MEDICARE

## 2025-08-12 VITALS — BODY MASS INDEX: 28.5 KG/M2 | WEIGHT: 199.08 LBS | HEIGHT: 70 IN

## 2025-08-12 DIAGNOSIS — M17.0 BILATERAL PRIMARY OSTEOARTHRITIS OF KNEE: Primary | ICD-10-CM

## 2025-08-12 PROCEDURE — 99213 OFFICE O/P EST LOW 20 MIN: CPT | Performed by: PHYSICIAN ASSISTANT

## (undated) DEVICE — Device: Brand: DEFENDO AIR/WATER/SUCTION AND BIOPSY VALVE

## (undated) DEVICE — ENDOGATOR AUXILIARY WATER JET CONNECTOR: Brand: ENDOGATOR

## (undated) DEVICE — CONN Y IRR DISP 1P/U

## (undated) DEVICE — Device

## (undated) DEVICE — TUBING, SUCTION, 1/4" X 20', STRAIGHT: Brand: MEDLINE INDUSTRIES, INC.

## (undated) DEVICE — THE BITE BLOCK MAXI, LATEX FREE STRAP IS USED TO PROTECT THE ENDOSCOPE INSERTION TUBE FROM BEING BITTEN BY THE PATIENT.